# Patient Record
Sex: FEMALE | Race: WHITE | NOT HISPANIC OR LATINO | Employment: OTHER | URBAN - METROPOLITAN AREA
[De-identification: names, ages, dates, MRNs, and addresses within clinical notes are randomized per-mention and may not be internally consistent; named-entity substitution may affect disease eponyms.]

---

## 2017-05-25 RX ORDER — AZELASTINE 1 MG/ML
1 SPRAY, METERED NASAL
COMMUNITY
End: 2019-10-23

## 2017-06-01 ENCOUNTER — DOCUMENTATION ONLY (OUTPATIENT)
Dept: HEMATOLOGY/ONCOLOGY | Facility: CLINIC | Age: 77
End: 2017-06-01

## 2018-03-15 ENCOUNTER — OFFICE VISIT (OUTPATIENT)
Dept: HEMATOLOGY/ONCOLOGY | Facility: CLINIC | Age: 78
End: 2018-03-15
Payer: MEDICARE

## 2018-03-15 VITALS
TEMPERATURE: 97 F | HEART RATE: 66 BPM | DIASTOLIC BLOOD PRESSURE: 52 MMHG | WEIGHT: 177 LBS | BODY MASS INDEX: 29.01 KG/M2 | RESPIRATION RATE: 18 BRPM | SYSTOLIC BLOOD PRESSURE: 104 MMHG

## 2018-03-15 DIAGNOSIS — Z85.3 PERSONAL HISTORY OF MALIGNANT NEOPLASM OF BREAST: Primary | ICD-10-CM

## 2018-03-15 PROCEDURE — 99214 OFFICE O/P EST MOD 30 MIN: CPT | Mod: ,,, | Performed by: INTERNAL MEDICINE

## 2018-03-15 RX ORDER — ASPIRIN 81 MG/1
81 TABLET ORAL
COMMUNITY
End: 2019-10-23

## 2018-03-15 RX ORDER — FUROSEMIDE 20 MG/1
20 TABLET ORAL 2 TIMES DAILY
COMMUNITY

## 2018-03-15 RX ORDER — HYDROCODONE BITARTRATE AND ACETAMINOPHEN 7.5; 325 MG/1; MG/1
1 TABLET ORAL
COMMUNITY
Start: 2016-03-28 | End: 2019-10-23

## 2018-03-15 RX ORDER — CLEMASTINE FUMARATE 2.68 MG/1
2.68 TABLET ORAL
COMMUNITY
End: 2019-10-23

## 2018-03-15 RX ORDER — INSULIN GLARGINE 100 [IU]/ML
55 INJECTION, SOLUTION SUBCUTANEOUS
COMMUNITY
Start: 2015-11-09 | End: 2019-10-23 | Stop reason: SDUPTHER

## 2018-03-15 RX ORDER — CLONIDINE HYDROCHLORIDE 0.1 MG/1
0.1 TABLET, EXTENDED RELEASE ORAL
COMMUNITY
End: 2019-10-23

## 2018-03-15 RX ORDER — ENALAPRIL MALEATE 10 MG/1
10 TABLET ORAL
COMMUNITY
End: 2019-10-23

## 2018-03-15 RX ORDER — METOPROLOL SUCCINATE 200 MG/1
100 TABLET, EXTENDED RELEASE ORAL
COMMUNITY
End: 2019-10-23 | Stop reason: ALTCHOICE

## 2018-03-15 RX ORDER — GABAPENTIN 300 MG/1
300 CAPSULE ORAL
COMMUNITY
End: 2019-10-23 | Stop reason: SDUPTHER

## 2018-03-15 RX ORDER — SEVELAMER CARBONATE 800 MG/1
1600 TABLET, FILM COATED ORAL
COMMUNITY

## 2018-03-15 RX ORDER — TAMOXIFEN CITRATE 20 MG/1
20 TABLET ORAL
COMMUNITY
End: 2019-10-23

## 2018-03-15 RX ORDER — METOPROLOL SUCCINATE 100 MG/1
100 TABLET, EXTENDED RELEASE ORAL
COMMUNITY
End: 2019-10-23 | Stop reason: ALTCHOICE

## 2018-03-15 RX ORDER — ONDANSETRON 4 MG/1
4 TABLET, FILM COATED ORAL
COMMUNITY
Start: 2017-11-27 | End: 2018-11-27

## 2018-03-15 RX ORDER — AZELASTINE 1 MG/ML
1 SPRAY, METERED NASAL
COMMUNITY
End: 2019-10-23

## 2018-03-15 RX ORDER — PRAVASTATIN SODIUM 40 MG/1
40 TABLET ORAL NIGHTLY
COMMUNITY

## 2018-03-15 RX ORDER — HYDROXYZINE HYDROCHLORIDE 25 MG/1
25 TABLET, FILM COATED ORAL
COMMUNITY
End: 2019-10-23

## 2018-03-15 RX ORDER — METOLAZONE 2.5 MG/1
2.5 TABLET ORAL
COMMUNITY
End: 2019-10-23

## 2018-03-15 RX ORDER — LOSARTAN POTASSIUM 25 MG/1
25 TABLET ORAL DAILY
Status: ON HOLD | COMMUNITY
End: 2019-10-29 | Stop reason: HOSPADM

## 2018-03-18 PROBLEM — Z85.3 PERSONAL HISTORY OF MALIGNANT NEOPLASM OF BREAST: Status: ACTIVE | Noted: 2018-03-18

## 2018-03-19 NOTE — PROGRESS NOTES
[facility] History & Physical    Subjective:      Patient ID:   Brandi Camacho  78 y.o. female  1940  Huey Skinner      Chief Complaint:   Breast cancer    HPI:  78 y.o. female with diagnosis of  L breast cancer, 2011.  Stage 1 dx.  Adjuvant Tamoxifen x's 5 years.  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  MWF with procrit.    Smoke no, ETOH no.    DM, CRF, GERD, UTI, arthritis    Shunt placement L arm.  Hysterectomy, , R & L knee surgery.  MRM at L breast.    ROS:   GEN: normal without any fever, night sweats or weight loss  HEENT: normal with no HA's, sore throat, stiff neck, changes in vision  CV: normal with no CP, SOB, PND, ALVARES or orthopnea  PULM: normal with no SOB, cough, hemoptysis, sputum or pleuritic pain  GI: normal with no abdominal pain, nausea, vomiting, constipation, diarrhea, melanotic stools, BRBPR, or hematemesis  : See HPI  BREAST: See HPI  SKIN: normal with no rash, erythema, bruising, or swelling     Past Medical History:   Diagnosis Date    Anemia     sees Dr. Loyola and Dr. Turner    Anticoagulant long-term use     aspirin    Arthritis     Cancer     left breast ca    Diabetes mellitus     Encounter for blood transfusion     GERD (gastroesophageal reflux disease)     Hypertension     sees Dr. Farmer    Urinary tract infection      Past Surgical History:   Procedure Laterality Date    BREAST SURGERY  2011    left mastectomy [facility] dr begum     SECTION      x2    endoscopic precancerous adenomatous      [facility], ampulla of vater    HYSTERECTOMY      EDI    left knee patella FRACTURE      MODIFIED RADICAL MASTECTOMY W/ AXILLARY LYMPH NODE DISSECTION      right knee orthoscope         Review of patient's allergies indicates:   Allergen Reactions    Metformin Rash and Nausea And Vomiting     Other reaction(s): Unknown    Bactrim [sulfamethoxazole-trimethoprim] Rash    Sulfamethoxazole Rash     Other reaction(s): Rash  Other  reaction(s): Rash    Trimethoprim Rash     Other reaction(s): Rash  Other reaction(s): Rash     Social History     Social History    Marital status:      Spouse name: N/A    Number of children: N/A    Years of education: N/A     Occupational History    Not on file.     Social History Main Topics    Smoking status: Never Smoker    Smokeless tobacco: Never Used    Alcohol use No    Drug use: No    Sexual activity: Not on file     Other Topics Concern    Not on file     Social History Narrative    No narrative on file         Current Outpatient Prescriptions:     hydrocodone-acetaminophen 7.5-325mg (NORCO) 7.5-325 mg per tablet, Take 1 tablet by mouth., Disp: , Rfl:     insulin glargine (LANTUS) 100 unit/mL injection, Inject 55 Units into the skin., Disp: , Rfl:     ondansetron (ZOFRAN) 4 MG tablet, Take 4 mg by mouth., Disp: , Rfl:     ALOE VERA ORAL, No Sig Provided, Disp: , Rfl:     amlodipine (NORVASC) 5 MG tablet, Take 1 tablet (5 mg total) by mouth 2 (two) times daily., Disp: 60 tablet, Rfl: 0    aspirin (ECOTRIN) 81 MG EC tablet, Take 81 mg by mouth once daily., Disp: , Rfl:     aspirin (ECOTRIN) 81 MG EC tablet, Take 81 mg by mouth., Disp: , Rfl:     azelastine (ASTELIN) 137 mcg (0.1 %) nasal spray, 1 spray by Nasal route., Disp: , Rfl:     azelastine (ASTELIN) 137 mcg (0.1 %) nasal spray, 1 spray by Nasal route., Disp: , Rfl:     benzonatate (TESSALON) 100 MG capsule, Take 100 mg by mouth 3 (three) times daily as needed for Cough., Disp: , Rfl:     clemastine (TAVIST) 2.68 mg Tab, Take 2.68 mg by mouth 2 (two) times daily as needed., Disp: , Rfl:     clemastine (TAVIST) 2.68 mg Tab, Take 2.68 mg by mouth., Disp: , Rfl:     cloNIDine HCl 0.1 mg Tb12, Take 0.1 mg by mouth., Disp: , Rfl:     docusate sodium (COLACE) 50 MG capsule, Take by mouth daily as needed for Constipation., Disp: , Rfl:     docusate sodium (COLACE) 50 MG capsule, Take by mouth., Disp: , Rfl:     enalapril  (VASOTEC) 10 MG tablet, Take 10 mg by mouth 2 (two) times daily. , Disp: , Rfl:     enalapril (VASOTEC) 10 MG tablet, Take 10 mg by mouth., Disp: , Rfl:     furosemide (LASIX) 40 MG tablet, Take 40 mg by mouth., Disp: , Rfl:     gabapentin (NEURONTIN) 300 MG capsule, Take 300 mg by mouth 4 (four) times daily. , Disp: , Rfl:     gabapentin (NEURONTIN) 300 MG capsule, Take 300 mg by mouth., Disp: , Rfl:     hydrALAZINE (APRESOLINE) 50 MG tablet, Take 1 tablet (50 mg total) by mouth every 8 (eight) hours., Disp: 90 tablet, Rfl: 0    hydrOXYzine HCl (ATARAX) 25 MG tablet, Take 25 mg by mouth., Disp: , Rfl:     hydrOXYzine pamoate (VISTARIL) 25 MG Cap, Take 25 mg by mouth 2 (two) times daily as needed., Disp: , Rfl:     insulin glargine (LANTUS) 100 unit/mL injection, Inject 55 Units into the skin every evening. No Sig Provided, Disp: , Rfl:     insulin NPH-insulin regular (NOVOLIN 70/30) 100 unit/mL (70-30) injection, Inject 65 Units into the skin once daily. Every morning, Disp: , Rfl:     losartan (COZAAR) 100 MG tablet, Take 100 mg by mouth once daily., Disp: , Rfl:     losartan (COZAAR) 100 MG tablet, Take 100 mg by mouth., Disp: , Rfl:     metolazone (ZAROXOLYN) 2.5 MG tablet, Take 2.5 mg by mouth 2 (two) times daily. , Disp: , Rfl:     metOLazone (ZAROXOLYN) 2.5 MG tablet, Take 2.5 mg by mouth., Disp: , Rfl:     metoprolol succinate (TOPROL-XL) 100 MG 24 hr tablet, Take 100 mg by mouth 2 (two) times daily., Disp: , Rfl:     metoprolol succinate (TOPROL-XL) 100 MG 24 hr tablet, Take 100 mg by mouth., Disp: , Rfl:     metoprolol succinate (TOPROL-XL) 200 MG 24 hr tablet, Take 100 mg by mouth., Disp: , Rfl:     omega-3 fatty acids-fish oil 340-1,000 mg Cap, Take 1 capsule by mouth once daily., Disp: 30 capsule, Rfl: 0    oxybutynin (DITROPAN-XL) 5 MG TR24, Take 10 mg by mouth once daily., Disp: , Rfl:     polyethylene glycol (COLYTE) 240-22.72-6.72 gram solution, Use 17 g in 8 ounces of water  orally daily, Disp: 4000 mL, Rfl: 0    pravastatin (PRAVACHOL) 40 MG tablet, Take 40 mg by mouth every evening., Disp: , Rfl:     pravastatin (PRAVACHOL) 40 MG tablet, Take 40 mg by mouth., Disp: , Rfl:     ranitidine (ZANTAC) 150 MG tablet, Take 150 mg by mouth 2 (two) times daily., Disp: , Rfl:     ranitidine (ZANTAC) 150 MG tablet, Take 150 mg by mouth., Disp: , Rfl:     sevelamer carbonate (RENVELA) 800 mg Tab, Take 800 mg by mouth., Disp: , Rfl:     simethicone 40 mg Chew, Take 40 mg by mouth every meal as needed., Disp: 100 tablet, Rfl: 0    tamoxifen (NOLVADEX) 20 MG Tab, Take 20 mg by mouth once daily., Disp: , Rfl:     tamoxifen (NOLVADEX) 20 MG Tab, Take 20 mg by mouth., Disp: , Rfl:           Objective:   Vitals:  Blood pressure (!) 104/52, pulse 66, temperature 97 °F (36.1 °C), resp. rate 18, weight 80.3 kg (177 lb).    Physical Examination:   GEN: no apparent distress, comfortable  HEAD: atraumatic and normocephalic  EYES: no pallor, no icterus  ENT:  no pharyngeal erythema, external ears WNL; no nasal discharge  NECK: no masses, thyroid normal, trachea midline, no LAD/LN's, supple  CV: RRR with no murmur; normal pulse; normal S1 and S2; no pedal edema  CHEST: Normal respiratory effort; CTAB; normal breath sounds; no wheeze or crackles  ABDOM: nontender and nondistended; soft; normal bowel sounds; no rebound/guarding  MUSC/Skeletal: ROM normal; no crepitus; joints normal; no deformities   EXTREM: shunt site L arm with thrill  SKIN: no rashes, lesions, ulcers, petechiae   : no mcgovern  NEURO: grossly intact; motor/sensory WNL; no tremors  PSYCH: normal mood, affect and behavior  LYMPH: normal cervical, supraclavicular, axillary and groin LN's  L chest wall with post op changes, no palpable mass.  R breast NT, with out palpable mass.    Labs:   Lab Results   Component Value Date    WBC 12.40 04/26/2016    HGB 8.0 (L) 04/26/2016    HCT 24.3 (L) 04/26/2016    MCV 95 04/26/2016     04/26/2016     CMP  Sodium   Date Value Ref Range Status   04/26/2016 135 (L) 136 - 145 mmol/L Final     Potassium   Date Value Ref Range Status   04/26/2016 3.9 3.5 - 5.1 mmol/L Final     Chloride   Date Value Ref Range Status   04/26/2016 103 95 - 110 mmol/L Final     CO2   Date Value Ref Range Status   04/26/2016 20 (L) 23 - 29 mmol/L Final     Glucose   Date Value Ref Range Status   04/26/2016 122 (H) 70 - 110 mg/dL Final     BUN, Bld   Date Value Ref Range Status   04/26/2016 31 (H) 8 - 23 mg/dL Final     Creatinine   Date Value Ref Range Status   04/26/2016 3.7 (H) 0.5 - 1.4 mg/dL Final     Calcium   Date Value Ref Range Status   04/26/2016 8.2 (L) 8.7 - 10.5 mg/dL Final     Total Protein   Date Value Ref Range Status   11/10/2015 6.6 6.0 - 8.4 g/dL Final     Albumin   Date Value Ref Range Status   11/10/2015 2.3 (L) 3.5 - 5.2 g/dL Final     Total Bilirubin   Date Value Ref Range Status   11/10/2015 0.2 0.1 - 1.0 mg/dL Final     Comment:     For infants and newborns, interpretation of results should be based  on gestational age, weight and in agreement with clinical  observations.  Premature Infant recommended reference ranges:  Up to 24 hours.............<8.0 mg/dL  Up to 48 hours............<12.0 mg/dL  3-5 days..................<15.0 mg/dL  6-29 days.................<15.0 mg/dL       Alkaline Phosphatase   Date Value Ref Range Status   11/10/2015 75 55 - 135 U/L Final     AST   Date Value Ref Range Status   11/10/2015 14 10 - 40 U/L Final     ALT   Date Value Ref Range Status   11/10/2015 22 10 - 44 U/L Final     Anion Gap   Date Value Ref Range Status   04/26/2016 12 8 - 16 mmol/L Final     eGFR if    Date Value Ref Range Status   04/26/2016 13 (A) >60 mL/min/1.73 m^2 Final     eGFR if non    Date Value Ref Range Status   04/26/2016 11 (A) >60 mL/min/1.73 m^2 Final     Comment:     Calculation used to obtain the estimated glomerular filtration  rate (eGFR) is the CKD-EPI equation. Since  race is unknown   in our information system, the eGFR values for   -American and Non--American patients are given   for each creatinine result.       Radiology/Diagnostic Studies:    U/S resolving hematoma at R breast    Assessment:   (1) 78 y.o. female with diagnosis of  L breast cancer, 8/2011.       Stage 1 Dx, adjuvant Tamoxifen daily.    (2)Anemia 2nd CRF, on dialysis, on procrit.    RTC 6 months.

## 2018-09-13 ENCOUNTER — TELEPHONE (OUTPATIENT)
Dept: HEMATOLOGY/ONCOLOGY | Facility: CLINIC | Age: 78
End: 2018-09-13

## 2018-09-13 ENCOUNTER — OFFICE VISIT (OUTPATIENT)
Dept: HEMATOLOGY/ONCOLOGY | Facility: CLINIC | Age: 78
End: 2018-09-13
Payer: MEDICARE

## 2018-09-13 VITALS
SYSTOLIC BLOOD PRESSURE: 106 MMHG | DIASTOLIC BLOOD PRESSURE: 58 MMHG | TEMPERATURE: 97 F | HEIGHT: 65 IN | BODY MASS INDEX: 29.32 KG/M2 | HEART RATE: 54 BPM | OXYGEN SATURATION: 96 % | WEIGHT: 176 LBS

## 2018-09-13 DIAGNOSIS — N18.4 CKD (CHRONIC KIDNEY DISEASE) STAGE 4, GFR 15-29 ML/MIN: ICD-10-CM

## 2018-09-13 DIAGNOSIS — Z85.3 PERSONAL HISTORY OF MALIGNANT NEOPLASM OF BREAST: Primary | ICD-10-CM

## 2018-09-13 DIAGNOSIS — K21.9 GASTROESOPHAGEAL REFLUX DISEASE, ESOPHAGITIS PRESENCE NOT SPECIFIED: ICD-10-CM

## 2018-09-13 PROCEDURE — 99214 OFFICE O/P EST MOD 30 MIN: CPT | Mod: ,,, | Performed by: INTERNAL MEDICINE

## 2018-09-13 NOTE — LETTER
September 16, 2018      No Recipients           [facility]  [address]  [place] [address]  Phone: [phone]  Fax: [phone]          Patient: Brandi Camacho   MR Number: [id number]   YOB: 1940   Date of Visit: 9/13/2018       Dear Dr. Greg Skinner IV:    Thank you for referring Brandi Camacho to me for evaluation. Attached you will find relevant portions of my assessment and plan of care.    If you have questions, please do not hesitate to call me. I look forward to following Brandi Camacho along with you.    Sincerely,    KATHY Tucker MD    Enclosure  CC:  No Recipients    If you would like to receive this communication electronically, please contact [email or web address] or [phone] to request more information on [employer] Link access.    For providers and/or their staff who would like to refer a patient to [facility], please contact us through our one-stop-shop provider referral line, Sagar Candelario, at [phone].    If you feel you have received this communication in error or would no longer like to receive these types of communications, please e-mail [email or web address]

## 2018-09-16 ENCOUNTER — TELEPHONE (OUTPATIENT)
Dept: HEMATOLOGY/ONCOLOGY | Facility: CLINIC | Age: 78
End: 2018-09-16

## 2018-09-17 NOTE — TELEPHONE ENCOUNTER
Call Mine JORGE  Where can pt get fitted for breast prosthesis locally?    Call pharmacist at [facility], can kytril 1 mg daily for nausea be used in pt with dialysis dependant renal failure?

## 2018-09-17 NOTE — PROGRESS NOTES
[facility] History & Physical    Subjective:      Patient ID:   Brandi Camacho  78 y.o. female  1940  Vanessa Banks      Chief Complaint:   Breast cancer    HPI:  78 y.o. female with diagnosis of  L breast cancer, 2011.  Stage 1 dx.  Adjuvant Tamoxifen x's 5 years.  Now 7 years since Dx.    Anemia 2nd CRF, on dialysis x's 1 -2  year.  MWF with procrit.    Smoke no, ETOH no.    DM, CRF, GERD, UTI, arthritis    Shunt placement L arm.  Hysterectomy, , R & L knee surgery.  MRM at  breast.    ROS:   GEN: normal without any fever, night sweats or weight loss  HEENT: normal with no HA's, sore throat, stiff neck, changes in vision  CV: normal with no CP, SOB, PND, ALVARES or orthopnea  PULM: normal with no SOB, cough, hemoptysis, sputum or pleuritic pain  GI: normal with no abdominal pain, nausea, vomiting, constipation, diarrhea, melanotic stools, BRBPR, or hematemesis  : See HPI  BREAST: See HPI  SKIN: normal with no rash, erythema, bruising, or swelling     Past Medical History:   Diagnosis Date    Anemia     sees Dr. Loyola and Dr. Turner    Anticoagulant long-term use     aspirin    Arthritis     Cancer     left breast ca    Diabetes mellitus     Encounter for blood transfusion     GERD (gastroesophageal reflux disease)     Hypertension     sees Dr. Farmer    Urinary tract infection      Past Surgical History:   Procedure Laterality Date    BREAST SURGERY  2011    left mastectomy [facility] dr begum     SECTION      x2    EGD (ESOPHAGOGASTRODUODENOSCOPY) N/A 2014    Performed by [facility]angelo [facility]    endoscopic precancerous adenomatous      main [place] dr colvin, ampulla of vater    ESOPHAGOGASTRODUODENOSCOPY (EGD) N/A 2015    [facility] by [facility]ennise [facility]ona [facility] at [facility]    HEMOSPLIT INSERTION N/A 2016    [facility] by Spencer [facility] at [facility] OR    HYSTERECTOMY      EDI    left knee patella FRACTURE      MODIFIED RADICAL  MASTECTOMY W/ AXILLARY LYMPH NODE DISSECTION      right knee orthoscope         Review of patient's allergies indicates:   Allergen Reactions    Metformin Rash and Nausea And Vomiting     Other reaction(s): Unknown    Bactrim [sulfamethoxazole-trimethoprim] Rash    Sulfamethoxazole Rash     Other reaction(s): Rash  Other reaction(s): Rash    Trimethoprim Rash     Other reaction(s): Rash  Other reaction(s): Rash     Social History     Socioeconomic History    Marital status:      Spouse name: Not on file    Number of children: Not on file    Years of education: Not on file    Highest education level: Not on file   Social Needs    Financial resource strain: Not on file    Food insecurity - worry: Not on file    Food insecurity - inability: Not on file    Transportation needs - medical: Not on file    Transportation needs - non-medical: Not on file   Occupational History    Not on file   Tobacco Use    Smoking status: Never Smoker    Smokeless tobacco: Never Used   Substance and Sexual Activity    Alcohol use: No    Drug use: No    Sexual activity: Not on file   Other Topics Concern    Not on file   Social History Narrative    Not on file         Current Outpatient Medications:     ALOE VERA ORAL, No Sig Provided, Disp: , Rfl:     aspirin (ECOTRIN) 81 MG EC tablet, Take 81 mg by mouth once daily., Disp: , Rfl:     aspirin (ECOTRIN) 81 MG EC tablet, Take 81 mg by mouth., Disp: , Rfl:     azelastine (ASTELIN) 137 mcg (0.1 %) nasal spray, 1 spray by Nasal route., Disp: , Rfl:     azelastine (ASTELIN) 137 mcg (0.1 %) nasal spray, 1 spray by Nasal route., Disp: , Rfl:     benzonatate (TESSALON) 100 MG capsule, Take 100 mg by mouth 3 (three) times daily as needed for Cough., Disp: , Rfl:     clemastine (TAVIST) 2.68 mg Tab, Take 2.68 mg by mouth 2 (two) times daily as needed., Disp: , Rfl:     clemastine (TAVIST) 2.68 mg Tab, Take 2.68 mg by mouth., Disp: , Rfl:     cloNIDine HCl 0.1 mg  Tb12, Take 0.1 mg by mouth., Disp: , Rfl:     docusate sodium (COLACE) 50 MG capsule, Take by mouth daily as needed for Constipation., Disp: , Rfl:     docusate sodium (COLACE) 50 MG capsule, Take by mouth., Disp: , Rfl:     enalapril (VASOTEC) 10 MG tablet, Take 10 mg by mouth 2 (two) times daily. , Disp: , Rfl:     enalapril (VASOTEC) 10 MG tablet, Take 10 mg by mouth., Disp: , Rfl:     furosemide (LASIX) 40 MG tablet, Take 40 mg by mouth., Disp: , Rfl:     gabapentin (NEURONTIN) 300 MG capsule, Take 300 mg by mouth 4 (four) times daily. , Disp: , Rfl:     gabapentin (NEURONTIN) 300 MG capsule, Take 300 mg by mouth., Disp: , Rfl:     hydrocodone-acetaminophen 7.5-325mg (NORCO) 7.5-325 mg per tablet, Take 1 tablet by mouth., Disp: , Rfl:     hydrOXYzine HCl (ATARAX) 25 MG tablet, Take 25 mg by mouth., Disp: , Rfl:     hydrOXYzine pamoate (VISTARIL) 25 MG Cap, Take 25 mg by mouth 2 (two) times daily as needed., Disp: , Rfl:     insulin glargine (LANTUS) 100 unit/mL injection, Inject 55 Units into the skin every evening. No Sig Provided, Disp: , Rfl:     insulin glargine (LANTUS) 100 unit/mL injection, Inject 55 Units into the skin., Disp: , Rfl:     insulin NPH-insulin regular (NOVOLIN 70/30) 100 unit/mL (70-30) injection, Inject 65 Units into the skin once daily. Every morning, Disp: , Rfl:     losartan (COZAAR) 100 MG tablet, Take 100 mg by mouth once daily., Disp: , Rfl:     losartan (COZAAR) 100 MG tablet, Take 100 mg by mouth., Disp: , Rfl:     metolazone (ZAROXOLYN) 2.5 MG tablet, Take 2.5 mg by mouth 2 (two) times daily. , Disp: , Rfl:     metOLazone (ZAROXOLYN) 2.5 MG tablet, Take 2.5 mg by mouth., Disp: , Rfl:     metoprolol succinate (TOPROL-XL) 100 MG 24 hr tablet, Take 100 mg by mouth 2 (two) times daily., Disp: , Rfl:     metoprolol succinate (TOPROL-XL) 100 MG 24 hr tablet, Take 100 mg by mouth., Disp: , Rfl:     metoprolol succinate (TOPROL-XL) 200 MG 24 hr tablet, Take 100 mg by  "mouth., Disp: , Rfl:     ondansetron (ZOFRAN) 4 MG tablet, Take 4 mg by mouth., Disp: , Rfl:     oxybutynin (DITROPAN-XL) 5 MG TR24, Take 10 mg by mouth once daily., Disp: , Rfl:     polyethylene glycol (COLYTE) 240-22.72-6.72 gram solution, Use 17 g in 8 ounces of water orally daily, Disp: 4000 mL, Rfl: 0    pravastatin (PRAVACHOL) 40 MG tablet, Take 40 mg by mouth every evening., Disp: , Rfl:     pravastatin (PRAVACHOL) 40 MG tablet, Take 40 mg by mouth., Disp: , Rfl:     ranitidine (ZANTAC) 150 MG tablet, Take 150 mg by mouth 2 (two) times daily., Disp: , Rfl:     ranitidine (ZANTAC) 150 MG tablet, Take 150 mg by mouth., Disp: , Rfl:     sevelamer carbonate (RENVELA) 800 mg Tab, Take 800 mg by mouth., Disp: , Rfl:     simethicone 40 mg Chew, Take 40 mg by mouth every meal as needed., Disp: 100 tablet, Rfl: 0    tamoxifen (NOLVADEX) 20 MG Tab, Take 20 mg by mouth once daily., Disp: , Rfl:     tamoxifen (NOLVADEX) 20 MG Tab, Take 20 mg by mouth., Disp: , Rfl:     amlodipine (NORVASC) 5 MG tablet, Take 1 tablet (5 mg total) by mouth 2 (two) times daily., Disp: 60 tablet, Rfl: 0    hydrALAZINE (APRESOLINE) 50 MG tablet, Take 1 tablet (50 mg total) by mouth every 8 (eight) hours., Disp: 90 tablet, Rfl: 0    omega-3 fatty acids-fish oil 340-1,000 mg Cap, Take 1 capsule by mouth once daily., Disp: 30 capsule, Rfl: 0          Objective:   Vitals:  Blood pressure (!) 106/58, pulse (!) 54, temperature 97 °F (36.1 °C), height 5' 5" (1.651 m), weight 79.8 kg (176 lb), SpO2 96 %.    Physical Examination:   GEN: no apparent distress, comfortable  HEAD: atraumatic and normocephalic  EYES: no pallor, no icterus  ENT:  no pharyngeal erythema, external ears WNL; no nasal discharge  NECK: no masses, thyroid normal, trachea midline, no LAD/LN's, supple  CV: RRR with no murmur; normal pulse; normal S1 and S2; no pedal edema  CHEST: Normal respiratory effort; CTAB; normal breath sounds; no wheeze or crackles  ABDOM: " nontender and nondistended; soft; normal bowel sounds; no rebound/guarding  MUSC/Skeletal: ROM normal; no crepitus; joints normal; no deformities   EXTREM: shunt site L arm with thrill  SKIN: no rashes, lesions, ulcers, petechiae   : no mcgovern  NEURO: grossly intact; motor/sensory WNL; no tremors  PSYCH: normal mood, affect and behavior  LYMPH: normal cervical, supraclavicular, axillary and groin LN's  L chest wall with post op changes, no palpable mass.  R breast NT, with out palpable mass.    Labs:   Lab Results   Component Value Date    WBC 12.40 04/26/2016    HGB 8.0 (L) 04/26/2016    HCT 24.3 (L) 04/26/2016    MCV 95 04/26/2016     04/26/2016    CMP  Sodium   Date Value Ref Range Status   04/26/2016 135 (L) 136 - 145 mmol/L Final     Potassium   Date Value Ref Range Status   04/26/2016 3.9 3.5 - 5.1 mmol/L Final     Chloride   Date Value Ref Range Status   04/26/2016 103 95 - 110 mmol/L Final     CO2   Date Value Ref Range Status   04/26/2016 20 (L) 23 - 29 mmol/L Final     Glucose   Date Value Ref Range Status   04/26/2016 122 (H) 70 - 110 mg/dL Final     BUN, Bld   Date Value Ref Range Status   04/26/2016 31 (H) 8 - 23 mg/dL Final     Creatinine   Date Value Ref Range Status   04/26/2016 3.7 (H) 0.5 - 1.4 mg/dL Final     Calcium   Date Value Ref Range Status   04/26/2016 8.2 (L) 8.7 - 10.5 mg/dL Final     Total Protein   Date Value Ref Range Status   11/10/2015 6.6 6.0 - 8.4 g/dL Final     Albumin   Date Value Ref Range Status   11/10/2015 2.3 (L) 3.5 - 5.2 g/dL Final     Total Bilirubin   Date Value Ref Range Status   11/10/2015 0.2 0.1 - 1.0 mg/dL Final     Comment:     For infants and newborns, interpretation of results should be based  on gestational age, weight and in agreement with clinical  observations.  Premature Infant recommended reference ranges:  Up to 24 hours.............<8.0 mg/dL  Up to 48 hours............<12.0 mg/dL  3-5 days..................<15.0 mg/dL  6-29  days.................<15.0 mg/dL       Alkaline Phosphatase   Date Value Ref Range Status   11/10/2015 75 55 - 135 U/L Final     AST   Date Value Ref Range Status   11/10/2015 14 10 - 40 U/L Final     ALT   Date Value Ref Range Status   11/10/2015 22 10 - 44 U/L Final     Anion Gap   Date Value Ref Range Status   04/26/2016 12 8 - 16 mmol/L Final     eGFR if    Date Value Ref Range Status   04/26/2016 13 (A) >60 mL/min/1.73 m^2 Final     eGFR if non    Date Value Ref Range Status   04/26/2016 11 (A) >60 mL/min/1.73 m^2 Final     Comment:     Calculation used to obtain the estimated glomerular filtration  rate (eGFR) is the CKD-EPI equation. Since race is unknown   in our information system, the eGFR values for   -American and Non--American patients are given   for each creatinine result.       Radiology/Diagnostic Studies:    U/S resolving hematoma at R breast    Assessment:   (1) 78 y.o. female with diagnosis of  L breast cancer, 8/2011.       Stage 1 Dx, adjuvant Tamoxifen daily.    (2)Anemia 2nd CRF, on dialysis, on procrit.    RTC 9 months.    Check on availability of breast prosthesis.  Also use of kytril in dialysis dependant CRF.

## 2019-10-23 ENCOUNTER — HOSPITAL ENCOUNTER (OUTPATIENT)
Facility: HOSPITAL | Age: 79
Discharge: HOME HEALTH | End: 2019-10-29
Attending: EMERGENCY MEDICINE | Admitting: FAMILY MEDICINE
Payer: MEDICARE

## 2019-10-23 DIAGNOSIS — R52 INTRACTABLE PAIN: ICD-10-CM
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
## 2019-10-23 DIAGNOSIS — S32.401A CLOSED NONDISPLACED FRACTURE OF RIGHT ACETABULUM, UNSPECIFIED PORTION OF ACETABULUM, INITIAL ENCOUNTER: Primary | ICD-10-CM

## 2019-10-23 PROBLEM — S32.591A CLOSED FRACTURE OF RIGHT INFERIOR PUBIC RAMUS: Status: ACTIVE | Noted: 2019-10-23

## 2019-10-23 PROBLEM — N18.6 ESRD (END STAGE RENAL DISEASE): Status: ACTIVE | Noted: 2019-10-23

## 2019-10-23 LAB
ALBUMIN SERPL BCP-MCNC: 2.9 G/DL (ref 3.5–5.2)
ALP SERPL-CCNC: 122 U/L (ref 55–135)
ALT SERPL W/O P-5'-P-CCNC: 18 U/L (ref 10–44)
ANION GAP SERPL CALC-SCNC: 13 MMOL/L (ref 8–16)
AST SERPL-CCNC: 23 U/L (ref 10–40)
BASOPHILS # BLD AUTO: 0.04 K/UL (ref 0–0.2)
BASOPHILS NFR BLD: 0.8 % (ref 0–1.9)
BILIRUB SERPL-MCNC: 1.2 MG/DL (ref 0.1–1)
BUN SERPL-MCNC: 46 MG/DL (ref 8–23)
CALCIUM SERPL-MCNC: 7.4 MG/DL (ref 8.7–10.5)
CHLORIDE SERPL-SCNC: 100 MMOL/L (ref 95–110)
CO2 SERPL-SCNC: 23 MMOL/L (ref 23–29)
CREAT SERPL-MCNC: 6.2 MG/DL (ref 0.5–1.4)
DIFFERENTIAL METHOD: ABNORMAL
EOSINOPHIL # BLD AUTO: 0.2 K/UL (ref 0–0.5)
EOSINOPHIL NFR BLD: 4.2 % (ref 0–8)
ERYTHROCYTE [DISTWIDTH] IN BLOOD BY AUTOMATED COUNT: 17 % (ref 11.5–14.5)
EST. GFR  (AFRICAN AMERICAN): 6.7 ML/MIN/1.73 M^2
EST. GFR  (NON AFRICAN AMERICAN): 5.8 ML/MIN/1.73 M^2
GLUCOSE SERPL-MCNC: 210 MG/DL (ref 70–110)
HCT VFR BLD AUTO: 35.5 % (ref 37–48.5)
HGB BLD-MCNC: 11.3 G/DL (ref 12–16)
IMM GRANULOCYTES # BLD AUTO: 0.02 K/UL (ref 0–0.04)
IMM GRANULOCYTES NFR BLD AUTO: 0.4 % (ref 0–0.5)
LYMPHOCYTES # BLD AUTO: 1 K/UL (ref 1–4.8)
LYMPHOCYTES NFR BLD: 19.6 % (ref 18–48)
MCH RBC QN AUTO: 33.5 PG (ref 27–31)
MCHC RBC AUTO-ENTMCNC: 31.8 G/DL (ref 32–36)
MCV RBC AUTO: 105 FL (ref 82–98)
MONOCYTES # BLD AUTO: 0.5 K/UL (ref 0.3–1)
MONOCYTES NFR BLD: 9.1 % (ref 4–15)
NEUTROPHILS # BLD AUTO: 3.3 K/UL (ref 1.8–7.7)
NEUTROPHILS NFR BLD: 65.9 % (ref 38–73)
NRBC BLD-RTO: 0 /100 WBC
PLATELET # BLD AUTO: 84 K/UL (ref 150–350)
PMV BLD AUTO: 10.5 FL (ref 9.2–12.9)
POTASSIUM SERPL-SCNC: 4.7 MMOL/L (ref 3.5–5.1)
PROT SERPL-MCNC: 6.2 G/DL (ref 6–8.4)
RBC # BLD AUTO: 3.37 M/UL (ref 4–5.4)
SODIUM SERPL-SCNC: 136 MMOL/L (ref 136–145)
WBC # BLD AUTO: 4.95 K/UL (ref 3.9–12.7)

## 2019-10-23 PROCEDURE — 96372 THER/PROPH/DIAG INJ SC/IM: CPT | Mod: 59

## 2019-10-23 PROCEDURE — 85025 COMPLETE CBC W/AUTO DIFF WBC: CPT

## 2019-10-23 PROCEDURE — 96375 TX/PRO/DX INJ NEW DRUG ADDON: CPT

## 2019-10-23 PROCEDURE — 25000003 PHARM REV CODE 250: Performed by: NURSE PRACTITIONER

## 2019-10-23 PROCEDURE — 80053 COMPREHEN METABOLIC PANEL: CPT

## 2019-10-23 PROCEDURE — 99285 EMERGENCY DEPT VISIT HI MDM: CPT | Mod: 25

## 2019-10-23 PROCEDURE — 63600175 PHARM REV CODE 636 W HCPCS: Performed by: EMERGENCY MEDICINE

## 2019-10-23 PROCEDURE — 96376 TX/PRO/DX INJ SAME DRUG ADON: CPT

## 2019-10-23 PROCEDURE — G0378 HOSPITAL OBSERVATION PER HR: HCPCS

## 2019-10-23 PROCEDURE — 96374 THER/PROPH/DIAG INJ IV PUSH: CPT

## 2019-10-23 PROCEDURE — 63600175 PHARM REV CODE 636 W HCPCS: Performed by: NURSE PRACTITIONER

## 2019-10-23 RX ORDER — ONDANSETRON 2 MG/ML
4 INJECTION INTRAMUSCULAR; INTRAVENOUS EVERY 8 HOURS PRN
Status: DISCONTINUED | OUTPATIENT
Start: 2019-10-23 | End: 2019-10-29 | Stop reason: HOSPADM

## 2019-10-23 RX ORDER — GLUCAGON 1 MG
1 KIT INJECTION
Status: DISCONTINUED | OUTPATIENT
Start: 2019-10-23 | End: 2019-10-29 | Stop reason: HOSPADM

## 2019-10-23 RX ORDER — GABAPENTIN 100 MG/1
100 CAPSULE ORAL 3 TIMES DAILY
Status: DISCONTINUED | OUTPATIENT
Start: 2019-10-23 | End: 2019-10-29 | Stop reason: HOSPADM

## 2019-10-23 RX ORDER — IBUPROFEN 200 MG
24 TABLET ORAL
Status: DISCONTINUED | OUTPATIENT
Start: 2019-10-23 | End: 2019-10-29 | Stop reason: HOSPADM

## 2019-10-23 RX ORDER — ONDANSETRON 4 MG/1
4 TABLET, ORALLY DISINTEGRATING ORAL EVERY 12 HOURS PRN
COMMUNITY

## 2019-10-23 RX ORDER — MORPHINE SULFATE 2 MG/ML
2 INJECTION, SOLUTION INTRAMUSCULAR; INTRAVENOUS
Status: COMPLETED | OUTPATIENT
Start: 2019-10-23 | End: 2019-10-23

## 2019-10-23 RX ORDER — INSULIN ASPART 100 [IU]/ML
30 INJECTION, SUSPENSION SUBCUTANEOUS DAILY
Status: DISCONTINUED | OUTPATIENT
Start: 2019-10-24 | End: 2019-10-24

## 2019-10-23 RX ORDER — SEVELAMER HYDROCHLORIDE 800 MG/1
1600 TABLET, FILM COATED ORAL 2 TIMES DAILY WITH MEALS
Status: DISCONTINUED | OUTPATIENT
Start: 2019-10-24 | End: 2019-10-29 | Stop reason: HOSPADM

## 2019-10-23 RX ORDER — FUROSEMIDE 20 MG/1
20 TABLET ORAL 2 TIMES DAILY
Status: DISCONTINUED | OUTPATIENT
Start: 2019-10-23 | End: 2019-10-29 | Stop reason: HOSPADM

## 2019-10-23 RX ORDER — FAMOTIDINE 20 MG/1
20 TABLET, FILM COATED ORAL DAILY
Status: DISCONTINUED | OUTPATIENT
Start: 2019-10-24 | End: 2019-10-29 | Stop reason: HOSPADM

## 2019-10-23 RX ORDER — LABETALOL 200 MG/1
200 TABLET, FILM COATED ORAL 2 TIMES DAILY
COMMUNITY

## 2019-10-23 RX ORDER — CETIRIZINE HYDROCHLORIDE 10 MG/1
10 TABLET ORAL DAILY
COMMUNITY

## 2019-10-23 RX ORDER — SEVELAMER HYDROCHLORIDE 800 MG/1
800 TABLET, FILM COATED ORAL
Status: DISCONTINUED | OUTPATIENT
Start: 2019-10-23 | End: 2019-10-29 | Stop reason: HOSPADM

## 2019-10-23 RX ORDER — LABETALOL 200 MG/1
200 TABLET, FILM COATED ORAL 2 TIMES DAILY
Status: DISCONTINUED | OUTPATIENT
Start: 2019-10-23 | End: 2019-10-29 | Stop reason: HOSPADM

## 2019-10-23 RX ORDER — CETIRIZINE HYDROCHLORIDE 10 MG/1
10 TABLET ORAL EVERY OTHER DAY
Status: DISCONTINUED | OUTPATIENT
Start: 2019-10-24 | End: 2019-10-29 | Stop reason: HOSPADM

## 2019-10-23 RX ORDER — OMEPRAZOLE 40 MG/1
40 CAPSULE, DELAYED RELEASE ORAL DAILY
COMMUNITY

## 2019-10-23 RX ORDER — INSULIN ASPART 100 [IU]/ML
10 INJECTION, SUSPENSION SUBCUTANEOUS NIGHTLY
Status: DISCONTINUED | OUTPATIENT
Start: 2019-10-23 | End: 2019-10-25

## 2019-10-23 RX ORDER — SEVELAMER CARBONATE 800 MG/1
800 TABLET, FILM COATED ORAL 2 TIMES DAILY WITH MEALS
COMMUNITY

## 2019-10-23 RX ORDER — PANTOPRAZOLE SODIUM 40 MG/1
40 TABLET, DELAYED RELEASE ORAL DAILY
Status: DISCONTINUED | OUTPATIENT
Start: 2019-10-24 | End: 2019-10-29 | Stop reason: HOSPADM

## 2019-10-23 RX ORDER — PROMETHAZINE HYDROCHLORIDE 12.5 MG/1
12.5 TABLET ORAL EVERY 8 HOURS PRN
COMMUNITY

## 2019-10-23 RX ORDER — AMOXICILLIN 250 MG
1 CAPSULE ORAL 2 TIMES DAILY
Status: DISCONTINUED | OUTPATIENT
Start: 2019-10-23 | End: 2019-10-29 | Stop reason: HOSPADM

## 2019-10-23 RX ORDER — ONDANSETRON 2 MG/ML
4 INJECTION INTRAMUSCULAR; INTRAVENOUS
Status: COMPLETED | OUTPATIENT
Start: 2019-10-23 | End: 2019-10-23

## 2019-10-23 RX ORDER — SODIUM CHLORIDE 0.9 % (FLUSH) 0.9 %
10 SYRINGE (ML) INJECTION
Status: DISCONTINUED | OUTPATIENT
Start: 2019-10-23 | End: 2019-10-29 | Stop reason: HOSPADM

## 2019-10-23 RX ORDER — MORPHINE SULFATE 2 MG/ML
2 INJECTION, SOLUTION INTRAMUSCULAR; INTRAVENOUS EVERY 4 HOURS PRN
Status: DISCONTINUED | OUTPATIENT
Start: 2019-10-23 | End: 2019-10-25

## 2019-10-23 RX ORDER — HYDROCODONE BITARTRATE AND ACETAMINOPHEN 5; 325 MG/1; MG/1
1 TABLET ORAL EVERY 6 HOURS PRN
Status: DISCONTINUED | OUTPATIENT
Start: 2019-10-23 | End: 2019-10-29 | Stop reason: HOSPADM

## 2019-10-23 RX ORDER — LOSARTAN POTASSIUM 25 MG/1
25 TABLET ORAL DAILY
Status: DISCONTINUED | OUTPATIENT
Start: 2019-10-24 | End: 2019-10-24

## 2019-10-23 RX ORDER — PRAVASTATIN SODIUM 40 MG/1
40 TABLET ORAL NIGHTLY
Status: DISCONTINUED | OUTPATIENT
Start: 2019-10-23 | End: 2019-10-29 | Stop reason: HOSPADM

## 2019-10-23 RX ORDER — IBUPROFEN 200 MG
16 TABLET ORAL
Status: DISCONTINUED | OUTPATIENT
Start: 2019-10-23 | End: 2019-10-29 | Stop reason: HOSPADM

## 2019-10-23 RX ORDER — INSULIN ASPART 100 [IU]/ML
0-5 INJECTION, SOLUTION INTRAVENOUS; SUBCUTANEOUS
Status: DISCONTINUED | OUTPATIENT
Start: 2019-10-23 | End: 2019-10-29 | Stop reason: HOSPADM

## 2019-10-23 RX ORDER — ACETAMINOPHEN 325 MG/1
650 TABLET ORAL EVERY 4 HOURS PRN
Status: DISCONTINUED | OUTPATIENT
Start: 2019-10-23 | End: 2019-10-29 | Stop reason: HOSPADM

## 2019-10-23 RX ADMIN — GABAPENTIN 100 MG: 100 CAPSULE ORAL at 09:10

## 2019-10-23 RX ADMIN — ONDANSETRON 4 MG: 2 INJECTION INTRAMUSCULAR; INTRAVENOUS at 12:10

## 2019-10-23 RX ADMIN — MORPHINE SULFATE 2 MG: 2 INJECTION, SOLUTION INTRAMUSCULAR; INTRAVENOUS at 03:10

## 2019-10-23 RX ADMIN — MORPHINE SULFATE 2 MG: 2 INJECTION, SOLUTION INTRAMUSCULAR; INTRAVENOUS at 02:10

## 2019-10-23 RX ADMIN — FUROSEMIDE 20 MG: 20 TABLET ORAL at 07:10

## 2019-10-23 RX ADMIN — PRAVASTATIN SODIUM 40 MG: 40 TABLET ORAL at 09:10

## 2019-10-23 RX ADMIN — HYDROCODONE BITARTRATE AND ACETAMINOPHEN 1 TABLET: 5; 325 TABLET ORAL at 09:10

## 2019-10-23 RX ADMIN — INSULIN ASPART 10 UNITS: 100 INJECTION, SUSPENSION SUBCUTANEOUS at 09:10

## 2019-10-23 RX ADMIN — SENNOSIDES AND DOCUSATE SODIUM 1 TABLET: 8.6; 5 TABLET ORAL at 09:10

## 2019-10-23 RX ADMIN — RENAGEL 800 MG: 800 TABLET ORAL at 07:10

## 2019-10-23 RX ADMIN — LABETALOL HYDROCHLORIDE 200 MG: 200 TABLET, FILM COATED ORAL at 09:10

## 2019-10-23 NOTE — SUBJECTIVE & OBJECTIVE
Past Medical History:   Diagnosis Date    Anemia     sees Dr. Loyola and Dr. Turner    Anticoagulant long-term use     aspirin    Arthritis     Cancer     left breast ca    Diabetes mellitus     Encounter for blood transfusion     GERD (gastroesophageal reflux disease)     Hypertension     sees Dr. Farmer    Renal disorder     [facility] on hemodialysis MWF    Urinary tract infection        Past Surgical History:   Procedure Laterality Date    AV FISTULA PLACEMENT Right     BREAST SURGERY  2011    left mastectomy [facility] dr begum     SECTION      x2    endoscopic precancerous adenomatous      [facility] dr colvin, ampulla of vater    HYSTERECTOMY      EDI    left knee patella FRACTURE      MODIFIED RADICAL MASTECTOMY W/ AXILLARY LYMPH NODE DISSECTION      right knee orthoscope         Review of patient's allergies indicates:   Allergen Reactions    Metformin Rash and Nausea And Vomiting     Other reaction(s): Unknown    Bactrim [sulfamethoxazole-trimethoprim] Rash    Sulfamethoxazole Rash     Other reaction(s): Rash  Other reaction(s): Rash    Trimethoprim Rash     Other reaction(s): Rash  Other reaction(s): Rash       No current facility-administered medications on file prior to encounter.      Current Outpatient Medications on File Prior to Encounter   Medication Sig    cetirizine (ZYRTEC) 10 MG tablet Take 10 mg by mouth once daily.    furosemide (LASIX) 20 MG tablet Take 20 mg by mouth 2 (two) times daily.     gabapentin (NEURONTIN) 100 MG capsule Take 100 mg by mouth 3 (three) times daily.     insulin NPH-insulin regular (NOVOLIN 70/30) 100 unit/mL (70-30) injection Inject 30 Units into the skin once daily. AND 10 UNITS QHS    insulin NPH-insulin regular, 70/30, (NOVOLIN 70/30) 100 unit/mL (70-30) injection Inject 10 Units into the skin every evening. AND 30 UNITS QAM    labetalol (NORMODYNE) 200 MG tablet Take 200 mg by mouth 2 (two) times daily.    losartan (COZAAR) 25 MG  tablet Take 25 mg by mouth once daily.     omeprazole (PRILOSEC) 40 MG capsule Take 40 mg by mouth once daily.    pravastatin (PRAVACHOL) 40 MG tablet Take 40 mg by mouth every evening.     ranitidine (ZANTAC) 150 MG tablet Take 150 mg by mouth 2 (two) times daily.     sevelamer carbonate (RENVELA) 800 mg Tab Take 1,600 mg by mouth once daily. With DINNER and 800 mg BID with other meals    sevelamer carbonate (RENVELA) 800 mg Tab Take 800 mg by mouth 2 (two) times daily. With meals, and 1600mg daily with the largest meal    vit B comp no.3/folic/C/biotin (TOM-ANA RX ORAL) Take 1 tablet by mouth once daily.    ondansetron (ZOFRAN-ODT) 8 MG TbDL Take 8 mg by mouth every 12 (twelve) hours as needed.    promethazine (PHENERGAN) 12.5 MG Tab Take 12.5 mg by mouth every 8 (eight) hours as needed.    [DISCONTINUED] ALOE VERA ORAL No Sig Provided    [DISCONTINUED] amlodipine (NORVASC) 5 MG tablet Take 1 tablet (5 mg total) by mouth 2 (two) times daily.    [DISCONTINUED] aspirin (ECOTRIN) 81 MG EC tablet Take 81 mg by mouth once daily.    [DISCONTINUED] aspirin (ECOTRIN) 81 MG EC tablet Take 81 mg by mouth.    [DISCONTINUED] azelastine (ASTELIN) 137 mcg (0.1 %) nasal spray 1 spray by Nasal route.    [DISCONTINUED] azelastine (ASTELIN) 137 mcg (0.1 %) nasal spray 1 spray by Nasal route.    [DISCONTINUED] benzonatate (TESSALON) 100 MG capsule Take 100 mg by mouth 3 (three) times daily as needed for Cough.    [DISCONTINUED] clemastine (TAVIST) 2.68 mg Tab Take 2.68 mg by mouth 2 (two) times daily as needed.    [DISCONTINUED] clemastine (TAVIST) 2.68 mg Tab Take 2.68 mg by mouth.    [DISCONTINUED] cloNIDine HCl 0.1 mg Tb12 Take 0.1 mg by mouth.    [DISCONTINUED] docusate sodium (COLACE) 50 MG capsule Take by mouth daily as needed for Constipation.    [DISCONTINUED] docusate sodium (COLACE) 50 MG capsule Take by mouth.    [DISCONTINUED] enalapril (VASOTEC) 10 MG tablet Take 10 mg by mouth 2 (two) times  daily.     [DISCONTINUED] enalapril (VASOTEC) 10 MG tablet Take 10 mg by mouth.    [DISCONTINUED] gabapentin (NEURONTIN) 300 MG capsule Take 300 mg by mouth.    [DISCONTINUED] hydrALAZINE (APRESOLINE) 50 MG tablet Take 1 tablet (50 mg total) by mouth every 8 (eight) hours.    [DISCONTINUED] hydrocodone-acetaminophen 7.5-325mg (NORCO) 7.5-325 mg per tablet Take 1 tablet by mouth.    [DISCONTINUED] hydrOXYzine HCl (ATARAX) 25 MG tablet Take 25 mg by mouth.    [DISCONTINUED] hydrOXYzine pamoate (VISTARIL) 25 MG Cap Take 25 mg by mouth 2 (two) times daily as needed.    [DISCONTINUED] insulin glargine (LANTUS) 100 unit/mL injection Inject 55 Units into the skin every evening. No Sig Provided    [DISCONTINUED] insulin glargine (LANTUS) 100 unit/mL injection Inject 55 Units into the skin.    [DISCONTINUED] losartan (COZAAR) 100 MG tablet Take 100 mg by mouth once daily.    [DISCONTINUED] metolazone (ZAROXOLYN) 2.5 MG tablet Take 2.5 mg by mouth 2 (two) times daily.     [DISCONTINUED] metOLazone (ZAROXOLYN) 2.5 MG tablet Take 2.5 mg by mouth.    [DISCONTINUED] metoprolol succinate (TOPROL-XL) 100 MG 24 hr tablet Take 100 mg by mouth 2 (two) times daily.    [DISCONTINUED] metoprolol succinate (TOPROL-XL) 100 MG 24 hr tablet Take 100 mg by mouth.    [DISCONTINUED] metoprolol succinate (TOPROL-XL) 200 MG 24 hr tablet Take 100 mg by mouth.    [DISCONTINUED] omega-3 fatty acids-fish oil 340-1,000 mg Cap Take 1 capsule by mouth once daily.    [DISCONTINUED] oxybutynin (DITROPAN-XL) 5 MG TR24 Take 10 mg by mouth once daily.    [DISCONTINUED] polyethylene glycol (COLYTE) 240-22.72-6.72 gram solution Use 17 g in 8 ounces of water orally daily    [DISCONTINUED] pravastatin (PRAVACHOL) 40 MG tablet Take 40 mg by mouth every evening.    [DISCONTINUED] ranitidine (ZANTAC) 150 MG tablet Take 150 mg by mouth 2 (two) times daily.    [DISCONTINUED] simethicone 40 mg Chew Take 40 mg by mouth every meal as needed.     [DISCONTINUED] tamoxifen (NOLVADEX) 20 MG Tab Take 20 mg by mouth once daily.    [DISCONTINUED] tamoxifen (NOLVADEX) 20 MG Tab Take 20 mg by mouth.     Family History     Problem Relation (Age of Onset)    Cancer Sister, Mother, Father, Sister    Diabetes Mother, Father    Hearing loss Mother        Tobacco Use    Smoking status: Never Smoker    Smokeless tobacco: Never Used   Substance and Sexual Activity    Alcohol use: No    Drug use: No    Sexual activity: Not on file     Review of Systems   Constitutional: Positive for fatigue. Negative for activity change, appetite change, chills, diaphoresis, fever and unexpected weight change.   HENT: Negative for congestion, ear pain, facial swelling, hearing loss, sore throat and trouble swallowing.    Eyes: Negative for pain and discharge.   Respiratory: Negative for cough, chest tightness, shortness of breath and wheezing.    Cardiovascular: Negative for chest pain, palpitations and leg swelling.   Gastrointestinal: Negative for abdominal pain, blood in stool, diarrhea, nausea and vomiting.   Endocrine: Negative for polydipsia, polyphagia and polyuria.   Genitourinary: Negative for difficulty urinating, dysuria, flank pain, frequency and urgency.   Musculoskeletal: Positive for arthralgias, back pain and gait problem. Negative for joint swelling, neck pain and neck stiffness.   Skin: Negative for rash and wound.   Allergic/Immunologic: Negative for environmental allergies and immunocompromised state.   Neurological: Positive for weakness. Negative for dizziness, seizures, syncope, speech difficulty, light-headedness, numbness and headaches.   Hematological: Negative for adenopathy.   Psychiatric/Behavioral: Negative for sleep disturbance and suicidal ideas. The patient is not nervous/anxious.    All other systems reviewed and are negative.    Objective:     Vital Signs (Most Recent):  Temp: 98.3 °F (36.8 °C) (10/23/19 0954)  Pulse: 61 (10/23/19 1500)  Resp: 20  (10/23/19 1430)  BP: (!) 104/53 (10/23/19 1500)  SpO2: (!) 88 % (10/23/19 1500) Vital Signs (24h Range):  Temp:  [98.3 °F (36.8 °C)] 98.3 °F (36.8 °C)  Pulse:  [61-71] 61  Resp:  [16-21] 20  SpO2:  [88 %-97 %] 88 %  BP: (101-163)/(50-70) 104/53     Weight: 81.6 kg (180 lb)  Body mass index is 29.05 kg/m².    Physical Exam   Constitutional: She is oriented to person, place, and time. She appears well-developed and well-nourished.   HENT:   Head: Normocephalic and atraumatic.   Eyes: Pupils are equal, round, and reactive to light. EOM are normal.   Neck: Normal range of motion. Neck supple.   Cardiovascular: Normal rate, regular rhythm and intact distal pulses.   Murmur heard.  Pulmonary/Chest: Effort normal and breath sounds normal. No stridor. No respiratory distress. She has no wheezes.   Abdominal: Soft. Bowel sounds are normal. She exhibits no distension. There is no tenderness.   Musculoskeletal: Normal range of motion. She exhibits tenderness.   Neurological: She is alert and oriented to person, place, and time.   Skin: Skin is warm and dry. Capillary refill takes less than 2 seconds.   Psychiatric: She has a normal mood and affect.   Nursing note and vitals reviewed.        CRANIAL NERVES     CN III, IV, VI   Pupils are equal, round, and reactive to light.  Extraocular motions are normal.        Significant Labs:   CBC:   Recent Labs   Lab 10/23/19  1040   WBC 4.95   HGB 11.3*   HCT 35.5*   PLT 84*     CMP:   Recent Labs   Lab 10/23/19  1040      K 4.7      CO2 23   *   BUN 46*   CREATININE 6.2*   CALCIUM 7.4*   PROT 6.2   ALBUMIN 2.9*   BILITOT 1.2*   ALKPHOS 122   AST 23   ALT 18   ANIONGAP 13   EGFRNONAA 5.8*       Significant Imaging: EKG: I have reviewed all pertinent results/findings within the past 24 hours and my personal findings are: NSR incomplete RBB, no acute ischemic changes     X-ray Thoracic Spine Ap And Lateral    Result Date: 10/23/2019  EXAMINATION: XR THORACIC SPINE AP  LATERAL CLINICAL HISTORY: Unspecified fall, initial encounter FINDINGS: Three views of the thoracic spine demonstrates the thoracic spine to be in satisfactory alignment.  There is multilevel disc space narrowing and anterior spurring.  The vertebral bodies are of normal height.  Pedicles are symmetrical. Paraspinous soft tissues are normal.  There is linear alveolar opacity in the right mid lung and right lower lobe which may represent atelectasis or infiltrate.     Degenerative changes of the thoracic spine without acute osseous abnormality Linear airspace disease in the right mid lung and right lower lobe suggestive of atelectasis or infiltrate Electronically signed by: Marycarmen Lee MD Date:    10/23/2019 Time:    12:28    X-ray Lumbar Spine Ap And Lateral    Result Date: 10/23/2019  EXAMINATION: XR LUMBAR SPINE AP AND LATERAL CLINICAL HISTORY: Fall injury; COMPARISON: None FINDINGS: Grade 1 anterolisthesis of L4 on L5 measuring 2-3 mm.  Lumbar spine alignment is otherwise normal.  Vertebral body heights are maintained.  Lower lumbar facet arthropathy most pronounced at L4-5.  Mild intervertebral disc space narrowing at L5-S1.  Atherosclerotic calcification of the aorta and branch vessels.  General osteopenia.     Negative for lumbar compression fracture. Lumbar spondylosis and lower lumbar facet arthropathy, most advanced at L4-5, with associated grade 1 anterolisthesis of L4 on L5. Electronically signed by: Romaine Ornelas MD Date:    10/23/2019 Time:    12:20    Ct Thoracic Spine Without Contrast    Result Date: 10/23/2019  EXAMINATION: CT THORACIC SPINE WITHOUT CONTRAST CLINICAL HISTORY: Polytrauma, critical, T/L spine inj suspected; TECHNIQUE: CMS Mandated Quality Data-CT Radiation Dose-436 All CT scans at this facility dose modulation, iterative reconstruction, and or weight-based dosing when appropriate to reduce radiation dose to as low as reasonably achievable. COMPARISON: Thoracic spine radiography  same day FINDINGS: Thoracic spine alignment is within normal limits.  Thoracic vertebral body heights are maintained.  No acute fracture of the thoracic spine is evident.  Mild multilevel thoracic disc space narrowing with osteophyte formation.  Disc calcifications noted at T7-8, T8-9, and T9-10.  No CT evidence of osseous neural foramen or spinal canal compromise. Limited images through the chest demonstrate a moderate size right pleural effusion layering dependently within the chest with associated compressive atelectasis.  Atherosclerotic calcification of the aorta.     Negative for acute thoracic spine fracture. Mild thoracic spondylosis. Right pleural effusion. Electronically signed by: Romaine Ornelas MD Date:    10/23/2019 Time:    13:30    Ct Lumbar Spine Without Contrast    Result Date: 10/23/2019  EXAMINATION: CT LUMBAR SPINE WITHOUT CONTRAST CLINICAL HISTORY: Polytrauma, critical, T/L spine inj suspected; TECHNIQUE: CMS Mandated Quality Data-CT Radiation Dose-436 All CT scans at this facility dose modulation, iterative reconstruction, and or weight-based dosing when appropriate to reduce radiation dose to as low as reasonably achievable. COMPARISON: Lumbar spine radiography same day FINDINGS: Transitional lumbosacral vertebral body is labeled L5 for the purposes of this exam.  Grade 1 anterolisthesis of L4 on L5 measures 3 mm.  Lumbar spine alignment is otherwise normal.  Vertebral body heights are maintained.  No acute fracture of the lumbar spine facet arthropathy is severe at L4-5. Right pleural effusion noted.  Limited images through the retroperitoneum demonstrate extensive atherosclerosis of the aorta and its branch vessels.  Mild bilateral SI joint degenerative changes.  General osteopenia.     No acute osseous abnormality of the lumbar spine. Severe L4-5 facet arthropathy with degenerative grade 1 anterolisthesis of L4 on L5. Transitional lumbosacral vertebral body which is labeled L5 for the  purposes of this exam. Electronically signed by: Romaine Ornelas MD Date:    10/23/2019 Time:    13:32    Ct Pelvis Without Contrast    Result Date: 10/23/2019  CMS MANDATED QUALITY DATA - CT RADIATION - 436 All CT scans at this facility utilize dose modulation, iterative reconstruction, and/or weight based dosing when appropriate to reduce radiation dose to as low as reasonably achievable. EXAMINATION: CT PELVIS WITHOUT CONTRAST CLINICAL HISTORY: Fall injury with low back pain; TECHNIQUE: Pelvic CT without IV contrast COMPARISON: Radiographs dated 10/23/2019 FINDINGS: There is acute nondisplaced fracture of the right inferior pubic ramus.  There is a questionable nondisplaced fracture of the anterior column of the right acetabulum.  Is uncertain if this represents a vascular channel or fracture.  There are no additional fractures.  There is osteopenia.  There pseudoarthrosis of the transverse processes of L5 on S1.  There is disc space narrowing at L4-5 and L5-S1. There is sigmoid diverticulosis without diverticulitis.  The the bladder is collapsed.  The patient has had a hysterectomy.  There is diffuse vascular calcification.  There is anasarca.     Acute nondisplaced fracture of the inferior right pubic ramus with questionable nondisplaced fracture of the anterior column of the right acetabulum. Colonic diverticulosis Diffuse anasarca Electronically signed by: Marycarmen Lee MD Date:    10/23/2019 Time:    13:41    X-ray Pelvis Routine Ap    Result Date: 10/23/2019  EXAMINATION: XR PELVIS ROUTINE AP CLINICAL HISTORY: Unspecified fall, initial encounter FINDINGS: There is osteopenia.  The SI joints and hip joints are symmetrical.  There is pseudoarthrosis of the right transverse process of L5 on S1. There is deformity of the right superior and inferior pubic rami spine presumably secondary to prior trauma.  There is no acute fracture.  There is moderate vascular calcification.     Osteopenia with deformity of the  right superior inferior pubic rami sub presumably secondary to prior trauma. Diffuse vascular calcification Electronically signed by: Marycarmen Lee MD Date:    10/23/2019 Time:    12:26

## 2019-10-23 NOTE — ED NOTES
Bed: [place] [address]  Expected date:   Expected time:   Means of arrival:   Comments:  KAYODE Valderrama

## 2019-10-23 NOTE — CONSULTS
Nephrology Consult Note        Patient Name: Brandi Camacho  MRN: [id number]    Patient Class: Emergency   Admission Date: 10/23/2019  Length of Stay: 0 days  Date of Service: 10/23/2019    Attending Physician: Wagner Saldaña Jr., *  Primary Care Provider: Greg Skinner Iv, MD    Reason for Consult: esrd/anemia/htn/shpt/fall with pelvic fracture    SUBJECTIVE:     HPI: 79F with breast cancer, DM2, GERD, HTN, ESRD on HD MWF, anemia, presents after a fall at home with pains and aches. She missed HD today. Imaging shows acute nondisplaced fracture of the inferior right pubic ramus with questionable nondisplaced fracture of the anterior column of the right acetabulum. Renal consulted for co-management.    Past Medical History:   Diagnosis Date    Anemia     sees Dr. Loyola and Dr. Turner    Anticoagulant long-term use     aspirin    Arthritis     Cancer     left breast ca    Diabetes mellitus     Encounter for blood transfusion     GERD (gastroesophageal reflux disease)     Hypertension     sees Dr. Farmre    Renal disorder     [facility] on hemodialysis MWF    Urinary tract infection      Past Surgical History:   Procedure Laterality Date    AV FISTULA PLACEMENT Right     BREAST SURGERY  2011    left mastectomy [facility] dr begum     SECTION      x2    endoscopic precancerous adenomatous      [facility], ampulla of vater    HYSTERECTOMY      EDI    left knee patella FRACTURE      MODIFIED RADICAL MASTECTOMY W/ AXILLARY LYMPH NODE DISSECTION      right knee orthoscope       Family History   Problem Relation Age of Onset    Cancer Sister     Cancer Mother         pancreatic    Diabetes Mother     Hearing loss Mother     Cancer Father         bladder    Diabetes Father     Cancer Sister         half sister, breast     Social History     Tobacco Use    Smoking status: Never Smoker    Smokeless tobacco: Never Used   Substance Use Topics    Alcohol use: No    Drug use: No        Review of patient's allergies indicates:   Allergen Reactions    Metformin Rash and Nausea And Vomiting     Other reaction(s): Unknown    Bactrim [sulfamethoxazole-trimethoprim] Rash    Sulfamethoxazole Rash     Other reaction(s): Rash  Other reaction(s): Rash    Trimethoprim Rash     Other reaction(s): Rash  Other reaction(s): Rash       Outpatient meds:  No current facility-administered medications on file prior to encounter.      Current Outpatient Medications on File Prior to Encounter   Medication Sig Dispense Refill    cetirizine (ZYRTEC) 10 MG tablet Take 10 mg by mouth once daily.      furosemide (LASIX) 20 MG tablet Take 20 mg by mouth 2 (two) times daily.       gabapentin (NEURONTIN) 100 MG capsule Take 100 mg by mouth 3 (three) times daily.       insulin NPH-insulin regular (NOVOLIN 70/30) 100 unit/mL (70-30) injection Inject 30 Units into the skin once daily. AND 10 UNITS QHS      insulin NPH-insulin regular, 70/30, (NOVOLIN 70/30) 100 unit/mL (70-30) injection Inject 10 Units into the skin every evening. AND 30 UNITS QAM      labetalol (NORMODYNE) 200 MG tablet Take 200 mg by mouth 2 (two) times daily.      losartan (COZAAR) 25 MG tablet Take 25 mg by mouth once daily.       omeprazole (PRILOSEC) 40 MG capsule Take 40 mg by mouth once daily.      pravastatin (PRAVACHOL) 40 MG tablet Take 40 mg by mouth every evening.       ranitidine (ZANTAC) 150 MG tablet Take 150 mg by mouth 2 (two) times daily.       sevelamer carbonate (RENVELA) 800 mg Tab Take 1,600 mg by mouth once daily. With DINNER and 800 mg BID with other meals      sevelamer carbonate (RENVELA) 800 mg Tab Take 800 mg by mouth 2 (two) times daily. With meals, and 1600mg daily with the largest meal      vit B comp no.3/folic/C/biotin (TOM-ANA RX ORAL) Take 1 tablet by mouth once daily.      ondansetron (ZOFRAN-ODT) 8 MG TbDL Take 8 mg by mouth every 12 (twelve) hours as needed.      promethazine (PHENERGAN) 12.5 MG  Tab Take 12.5 mg by mouth every 8 (eight) hours as needed.      [DISCONTINUED] ALOE VERA ORAL No Sig Provided      [DISCONTINUED] amlodipine (NORVASC) 5 MG tablet Take 1 tablet (5 mg total) by mouth 2 (two) times daily. 60 tablet 0    [DISCONTINUED] aspirin (ECOTRIN) 81 MG EC tablet Take 81 mg by mouth once daily.      [DISCONTINUED] aspirin (ECOTRIN) 81 MG EC tablet Take 81 mg by mouth.      [DISCONTINUED] azelastine (ASTELIN) 137 mcg (0.1 %) nasal spray 1 spray by Nasal route.      [DISCONTINUED] azelastine (ASTELIN) 137 mcg (0.1 %) nasal spray 1 spray by Nasal route.      [DISCONTINUED] benzonatate (TESSALON) 100 MG capsule Take 100 mg by mouth 3 (three) times daily as needed for Cough.      [DISCONTINUED] clemastine (TAVIST) 2.68 mg Tab Take 2.68 mg by mouth 2 (two) times daily as needed.      [DISCONTINUED] clemastine (TAVIST) 2.68 mg Tab Take 2.68 mg by mouth.      [DISCONTINUED] cloNIDine HCl 0.1 mg Tb12 Take 0.1 mg by mouth.      [DISCONTINUED] docusate sodium (COLACE) 50 MG capsule Take by mouth daily as needed for Constipation.      [DISCONTINUED] docusate sodium (COLACE) 50 MG capsule Take by mouth.      [DISCONTINUED] enalapril (VASOTEC) 10 MG tablet Take 10 mg by mouth 2 (two) times daily.       [DISCONTINUED] enalapril (VASOTEC) 10 MG tablet Take 10 mg by mouth.      [DISCONTINUED] gabapentin (NEURONTIN) 300 MG capsule Take 300 mg by mouth.      [DISCONTINUED] hydrALAZINE (APRESOLINE) 50 MG tablet Take 1 tablet (50 mg total) by mouth every 8 (eight) hours. 90 tablet 0    [DISCONTINUED] hydrocodone-acetaminophen 7.5-325mg (NORCO) 7.5-325 mg per tablet Take 1 tablet by mouth.      [DISCONTINUED] hydrOXYzine HCl (ATARAX) 25 MG tablet Take 25 mg by mouth.      [DISCONTINUED] hydrOXYzine pamoate (VISTARIL) 25 MG Cap Take 25 mg by mouth 2 (two) times daily as needed.      [DISCONTINUED] insulin glargine (LANTUS) 100 unit/mL injection Inject 55 Units into the skin every evening. No Sig  Provided      [DISCONTINUED] insulin glargine (LANTUS) 100 unit/mL injection Inject 55 Units into the skin.      [DISCONTINUED] losartan (COZAAR) 100 MG tablet Take 100 mg by mouth once daily.      [DISCONTINUED] metolazone (ZAROXOLYN) 2.5 MG tablet Take 2.5 mg by mouth 2 (two) times daily.       [DISCONTINUED] metOLazone (ZAROXOLYN) 2.5 MG tablet Take 2.5 mg by mouth.      [DISCONTINUED] metoprolol succinate (TOPROL-XL) 100 MG 24 hr tablet Take 100 mg by mouth 2 (two) times daily.      [DISCONTINUED] metoprolol succinate (TOPROL-XL) 100 MG 24 hr tablet Take 100 mg by mouth.      [DISCONTINUED] metoprolol succinate (TOPROL-XL) 200 MG 24 hr tablet Take 100 mg by mouth.      [DISCONTINUED] omega-3 fatty acids-fish oil 340-1,000 mg Cap Take 1 capsule by mouth once daily. 30 capsule 0    [DISCONTINUED] oxybutynin (DITROPAN-XL) 5 MG TR24 Take 10 mg by mouth once daily.      [DISCONTINUED] polyethylene glycol (COLYTE) 240-22.72-6.72 gram solution Use 17 g in 8 ounces of water orally daily 4000 mL 0    [DISCONTINUED] pravastatin (PRAVACHOL) 40 MG tablet Take 40 mg by mouth every evening.      [DISCONTINUED] ranitidine (ZANTAC) 150 MG tablet Take 150 mg by mouth 2 (two) times daily.      [DISCONTINUED] simethicone 40 mg Chew Take 40 mg by mouth every meal as needed. 100 tablet 0    [DISCONTINUED] tamoxifen (NOLVADEX) 20 MG Tab Take 20 mg by mouth once daily.      [DISCONTINUED] tamoxifen (NOLVADEX) 20 MG Tab Take 20 mg by mouth.         Scheduled meds:   morphine  2 mg Intravenous ED 1 Time       Infusions:      PRN meds:      Review of Systems:  Review of Systems   Constitutional: Negative for chills, fever, malaise/fatigue and weight loss.   HENT: Negative for hearing loss and nosebleeds.    Eyes: Negative for blurred vision, double vision and photophobia.   Respiratory: Negative for cough, shortness of breath and wheezing.    Cardiovascular: Negative for chest pain, palpitations and leg swelling.    Gastrointestinal: Negative for abdominal pain, constipation, diarrhea, heartburn, nausea and vomiting.   Genitourinary: Negative for dysuria, frequency and urgency.   Musculoskeletal: Negative for falls, joint pain and myalgias.   Skin: Negative for itching and rash.   Neurological: Negative for dizziness, speech change, focal weakness, loss of consciousness and headaches.   Endo/Heme/Allergies: Does not bruise/bleed easily.   Psychiatric/Behavioral: Negative for depression and substance abuse. The patient is not nervous/anxious.        OBJECTIVE:     Vital Signs and IO (Last 24H):  Temp:  [98.3 °F (36.8 °C)]   Pulse:  [61-71]   Resp:  [16-21]   BP: (103-163)/(53-70)   SpO2:  [92 %-97 %]   No intake/output data recorded.    Wt Readings from Last 5 Encounters:   10/23/19 81.6 kg (180 lb)   09/13/18 79.8 kg (176 lb)   03/15/18 80.3 kg (177 lb)   04/20/16 84.8 kg (187 lb)   11/10/15 81.6 kg (180 lb)         Physical Exam:  Physical Exam   Constitutional: She is oriented to person, place, and time. She appears well-developed and well-nourished.   HENT:   Head: Normocephalic and atraumatic.   Mouth/Throat: Oropharynx is clear and moist.   Eyes: Pupils are equal, round, and reactive to light. EOM are normal. No scleral icterus.   Neck: Neck supple.   Cardiovascular: Normal rate and regular rhythm.   Pulmonary/Chest: Effort normal. No stridor. No respiratory distress.   Abdominal: Soft. She exhibits no distension.   Musculoskeletal: Normal range of motion. She exhibits no edema or deformity.   Neurological: She is alert and oriented to person, place, and time. No cranial nerve deficit.   Skin: Skin is warm and dry. No rash noted. She is not diaphoretic. No erythema.   Psychiatric: She has a normal mood and affect. Her behavior is normal.       Body mass index is 29.05 kg/m².    Laboratory:  Recent Labs   Lab 10/23/19  1040      K 4.7      CO2 23   BUN 46*   CREATININE 6.2*   ESTGFRAFRICA 6.7*   EGFRNONAA 5.8*    *       Recent Labs   Lab 10/23/19  1040   CALCIUM 7.4*   ALBUMIN 2.9*             No results for input(s): POCTGLUCOSE in the last 168 hours.          Recent Labs   Lab 10/23/19  1040   WBC 4.95   HGB 11.3*   HCT 35.5*   PLT 84*   *   MCHC 31.8*   MONO 9.1  0.5       Recent Labs   Lab 10/23/19  1040   BILITOT 1.2*   PROT 6.2   ALBUMIN 2.9*   ALKPHOS 122   ALT 18   AST 23             Invalid input(s): LEUCOCYTESUR          Microbiology Results (last 7 days)     ** No results found for the last 168 hours. **            ASSESSMENT/PLAN:     ESRD on HD MWF via [facility]  Continue current dialysis prescription.  Next HD on today or tomorrow.  Renal diet - low K, low phos.  No IVs or BP checks on access and/or non-dominant arm.    Anemia of CKD  Hgb and HCT are acceptable. Monitor.  Will provide SARAH BETH and/or IV iron PRN.    MBD / Secondary HPT  Ca, phos, PTH and vitamin D levels are acceptable.   Phos binders, vitamin D analogues and calcimimetics as needed.    HTN  BP seem controlled.   Tolerate asymptomatic HTN up to -160.  Continue home meds.  Low sodium diet.    Fall with pelvic fracture.  Plan per Surg and primary team.    Thank you for allowing us to participate in the care of your patient!   We will follow the patient and provide recommendations as needed.    Lavell Wolf MD    [facility]  [address] [place] [address]  [place] [id number]    [phone] - tel  [phone] - fax    10/23/2019 2:46 PM

## 2019-10-23 NOTE — H&P
"[facility] Medicine  History & Physical    DOS: 10/23/2019  2:33 PM    Patient Name: Brandi Camacho  MRN: [id number]  Admission Date: 10/23/2019  Attending Physician: Dr. Bullock  Primary Care Provider: Greg Skinner Iv, MD         Patient information was obtained from patient, relative(s) and ER records   Case personally discussed with Dr. Saldaña - DERIC NERI.     Subjective:     Principal Problem:Intractable pain    Chief Complaint:   Chief Complaint   Patient presents with    Fall     "Legs just gave out"    Back Pain        HPI: Ms. Camacho presents today with complaints of back pain. It is severe. It is associated with a mechanical fall last night and weakness. She denies head trauma, LOC, fever, chills, N/V/D, or dizziness. She's currently AAOx4 and states she fell last night that her legs "just gave out". She's in significant pain when she tries to move. She has a history of ESRD on HD MWF, HTN, IDDM, GERD, arthritis, and osteopenia. She lives at home with her elderly .    Past Medical History:   Diagnosis Date    Anemia     sees Dr. Loyola and Dr. Turner    Anticoagulant long-term use     aspirin    Arthritis     Cancer     left breast ca    Diabetes mellitus     Encounter for blood transfusion     GERD (gastroesophageal reflux disease)     Hypertension     sees Dr. Farmer    Renal disorder     CRF on hemodialysis MWF    Urinary tract infection        Past Surgical History:   Procedure Laterality Date    AV FISTULA PLACEMENT Right     BREAST SURGERY  2011    left mastectomy [facility] dr begum     SECTION      x2    [facility] precancerous adenomatous      [facility], ampulla of vater    HYSTERECTOMY      EDI    left knee patella FRACTURE      MODIFIED RADICAL MASTECTOMY W/ AXILLARY LYMPH NODE DISSECTION      right knee orthoscope         Review of patient's allergies indicates:   Allergen Reactions    Metformin Rash and Nausea And " Vomiting     Other reaction(s): Unknown    Bactrim [sulfamethoxazole-trimethoprim] Rash    Sulfamethoxazole Rash     Other reaction(s): Rash  Other reaction(s): Rash    Trimethoprim Rash     Other reaction(s): Rash  Other reaction(s): Rash       No current facility-administered medications on file prior to encounter.      Current Outpatient Medications on File Prior to Encounter   Medication Sig    cetirizine (ZYRTEC) 10 MG tablet Take 10 mg by mouth once daily.    furosemide (LASIX) 20 MG tablet Take 20 mg by mouth 2 (two) times daily.     gabapentin (NEURONTIN) 100 MG capsule Take 100 mg by mouth 3 (three) times daily.     insulin NPH-insulin regular (NOVOLIN 70/30) 100 unit/mL (70-30) injection Inject 30 Units into the skin once daily. AND 10 UNITS QHS    insulin NPH-insulin regular, 70/30, (NOVOLIN 70/30) 100 unit/mL (70-30) injection Inject 10 Units into the skin every evening. AND 30 UNITS QAM    labetalol (NORMODYNE) 200 MG tablet Take 200 mg by mouth 2 (two) times daily.    losartan (COZAAR) 25 MG tablet Take 25 mg by mouth once daily.     omeprazole (PRILOSEC) 40 MG capsule Take 40 mg by mouth once daily.    pravastatin (PRAVACHOL) 40 MG tablet Take 40 mg by mouth every evening.     ranitidine (ZANTAC) 150 MG tablet Take 150 mg by mouth 2 (two) times daily.     sevelamer carbonate (RENVELA) 800 mg Tab Take 1,600 mg by mouth once daily. With DINNER and 800 mg BID with other meals    sevelamer carbonate (RENVELA) 800 mg Tab Take 800 mg by mouth 2 (two) times daily. With meals, and 1600mg daily with the largest meal    vit B comp no.3/folic/C/biotin (TOM-ANA RX ORAL) Take 1 tablet by mouth once daily.    ondansetron (ZOFRAN-ODT) 8 MG TbDL Take 8 mg by mouth every 12 (twelve) hours as needed.    promethazine (PHENERGAN) 12.5 MG Tab Take 12.5 mg by mouth every 8 (eight) hours as needed.    [DISCONTINUED] ALOE VERA ORAL No Sig Provided    [DISCONTINUED] amlodipine (NORVASC) 5 MG tablet Take  1 tablet (5 mg total) by mouth 2 (two) times daily.    [DISCONTINUED] aspirin (ECOTRIN) 81 MG EC tablet Take 81 mg by mouth once daily.    [DISCONTINUED] aspirin (ECOTRIN) 81 MG EC tablet Take 81 mg by mouth.    [DISCONTINUED] azelastine (ASTELIN) 137 mcg (0.1 %) nasal spray 1 spray by Nasal route.    [DISCONTINUED] azelastine (ASTELIN) 137 mcg (0.1 %) nasal spray 1 spray by Nasal route.    [DISCONTINUED] benzonatate (TESSALON) 100 MG capsule Take 100 mg by mouth 3 (three) times daily as needed for Cough.    [DISCONTINUED] clemastine (TAVIST) 2.68 mg Tab Take 2.68 mg by mouth 2 (two) times daily as needed.    [DISCONTINUED] clemastine (TAVIST) 2.68 mg Tab Take 2.68 mg by mouth.    [DISCONTINUED] cloNIDine HCl 0.1 mg Tb12 Take 0.1 mg by mouth.    [DISCONTINUED] docusate sodium (COLACE) 50 MG capsule Take by mouth daily as needed for Constipation.    [DISCONTINUED] docusate sodium (COLACE) 50 MG capsule Take by mouth.    [DISCONTINUED] enalapril (VASOTEC) 10 MG tablet Take 10 mg by mouth 2 (two) times daily.     [DISCONTINUED] enalapril (VASOTEC) 10 MG tablet Take 10 mg by mouth.    [DISCONTINUED] gabapentin (NEURONTIN) 300 MG capsule Take 300 mg by mouth.    [DISCONTINUED] hydrALAZINE (APRESOLINE) 50 MG tablet Take 1 tablet (50 mg total) by mouth every 8 (eight) hours.    [DISCONTINUED] hydrocodone-acetaminophen 7.5-325mg (NORCO) 7.5-325 mg per tablet Take 1 tablet by mouth.    [DISCONTINUED] hydrOXYzine HCl (ATARAX) 25 MG tablet Take 25 mg by mouth.    [DISCONTINUED] hydrOXYzine pamoate (VISTARIL) 25 MG Cap Take 25 mg by mouth 2 (two) times daily as needed.    [DISCONTINUED] insulin glargine (LANTUS) 100 unit/mL injection Inject 55 Units into the skin every evening. No Sig Provided    [DISCONTINUED] insulin glargine (LANTUS) 100 unit/mL injection Inject 55 Units into the skin.    [DISCONTINUED] losartan (COZAAR) 100 MG tablet Take 100 mg by mouth once daily.    [DISCONTINUED] metolazone  (ZAROXOLYN) 2.5 MG tablet Take 2.5 mg by mouth 2 (two) times daily.     [DISCONTINUED] metOLazone (ZAROXOLYN) 2.5 MG tablet Take 2.5 mg by mouth.    [DISCONTINUED] metoprolol succinate (TOPROL-XL) 100 MG 24 hr tablet Take 100 mg by mouth 2 (two) times daily.    [DISCONTINUED] metoprolol succinate (TOPROL-XL) 100 MG 24 hr tablet Take 100 mg by mouth.    [DISCONTINUED] metoprolol succinate (TOPROL-XL) 200 MG 24 hr tablet Take 100 mg by mouth.    [DISCONTINUED] omega-3 fatty acids-fish oil 340-1,000 mg Cap Take 1 capsule by mouth once daily.    [DISCONTINUED] oxybutynin (DITROPAN-XL) 5 MG TR24 Take 10 mg by mouth once daily.    [DISCONTINUED] polyethylene glycol (COLYTE) 240-22.72-6.72 gram solution Use 17 g in 8 ounces of water orally daily    [DISCONTINUED] pravastatin (PRAVACHOL) 40 MG tablet Take 40 mg by mouth every evening.    [DISCONTINUED] ranitidine (ZANTAC) 150 MG tablet Take 150 mg by mouth 2 (two) times daily.    [DISCONTINUED] simethicone 40 mg Chew Take 40 mg by mouth every meal as needed.    [DISCONTINUED] tamoxifen (NOLVADEX) 20 MG Tab Take 20 mg by mouth once daily.    [DISCONTINUED] tamoxifen (NOLVADEX) 20 MG Tab Take 20 mg by mouth.     Family History     Problem Relation (Age of Onset)    Cancer Sister, Mother, Father, Sister    Diabetes Mother, Father    Hearing loss Mother        Tobacco Use    Smoking status: Never Smoker    Smokeless tobacco: Never Used   Substance and Sexual Activity    Alcohol use: No    Drug use: No    Sexual activity: Not on file     Review of Systems   Constitutional: Positive for fatigue. Negative for activity change, appetite change, chills, diaphoresis, fever and unexpected weight change.   HENT: Negative for congestion, ear pain, facial swelling, hearing loss, sore throat and trouble swallowing.    Eyes: Negative for pain and discharge.   Respiratory: Negative for cough, chest tightness, shortness of breath and wheezing.    Cardiovascular: Negative for  chest pain, palpitations and leg swelling.   Gastrointestinal: Negative for abdominal pain, blood in stool, diarrhea, nausea and vomiting.   Endocrine: Negative for polydipsia, polyphagia and polyuria.   Genitourinary: Negative for difficulty urinating, dysuria, flank pain, frequency and urgency.   Musculoskeletal: Positive for arthralgias, back pain and gait problem. Negative for joint swelling, neck pain and neck stiffness.   Skin: Negative for rash and wound.   Allergic/Immunologic: Negative for environmental allergies and immunocompromised state.   Neurological: Positive for weakness. Negative for dizziness, seizures, syncope, speech difficulty, light-headedness, numbness and headaches.   Hematological: Negative for adenopathy.   Psychiatric/Behavioral: Negative for sleep disturbance and suicidal ideas. The patient is not nervous/anxious.    All other systems reviewed and are negative.    Objective:     Vital Signs (Most Recent):  Temp: 98.3 °F (36.8 °C) (10/23/19 0954)  Pulse: 61 (10/23/19 1500)  Resp: 20 (10/23/19 1430)  BP: (!) 104/53 (10/23/19 1500)  SpO2: (!) 88 % (10/23/19 1500) Vital Signs (24h Range):  Temp:  [98.3 °F (36.8 °C)] 98.3 °F (36.8 °C)  Pulse:  [61-71] 61  Resp:  [16-21] 20  SpO2:  [88 %-97 %] 88 %  BP: (101-163)/(50-70) 104/53     Weight: 81.6 kg (180 lb)  Body mass index is 29.05 kg/m².    Physical Exam   Constitutional: She is oriented to person, place, and time. She appears well-developed and well-nourished.   HENT:   Head: Normocephalic and atraumatic.   Eyes: Pupils are equal, round, and reactive to light. EOM are normal.   Neck: Normal range of motion. Neck supple.   Cardiovascular: Normal rate, regular rhythm and intact distal pulses.   Murmur heard.  Pulmonary/Chest: Effort normal and breath sounds normal. No stridor. No respiratory distress. She has no wheezes.   Abdominal: Soft. Bowel sounds are normal. She exhibits no distension. There is no tenderness.   Musculoskeletal: Normal  range of motion. She exhibits tenderness.   Neurological: She is alert and oriented to person, place, and time.   Skin: Skin is warm and dry. Capillary refill takes less than 2 seconds.   Psychiatric: She has a normal mood and affect.   Nursing note and vitals reviewed.        CRANIAL NERVES     CN III, IV, VI   Pupils are equal, round, and reactive to light.  Extraocular motions are normal.        Significant Labs:   CBC:   Recent Labs   Lab 10/23/19  1040   WBC 4.95   HGB 11.3*   HCT 35.5*   PLT 84*     CMP:   Recent Labs   Lab 10/23/19  1040      K 4.7      CO2 23   *   BUN 46*   CREATININE 6.2*   CALCIUM 7.4*   PROT 6.2   ALBUMIN 2.9*   BILITOT 1.2*   ALKPHOS 122   AST 23   ALT 18   ANIONGAP 13   EGFRNONAA 5.8*       Significant Imaging: EKG: I have reviewed all pertinent results/findings within the past 24 hours and my personal findings are: NSR incomplete RBB, no acute ischemic changes     X-ray Thoracic Spine Ap And Lateral    Result Date: 10/23/2019  EXAMINATION: XR THORACIC SPINE AP LATERAL CLINICAL HISTORY: Unspecified fall, initial encounter FINDINGS: Three views of the thoracic spine demonstrates the thoracic spine to be in satisfactory alignment.  There is multilevel disc space narrowing and anterior spurring.  The vertebral bodies are of normal height.  Pedicles are symmetrical. Paraspinous soft tissues are normal.  There is linear alveolar opacity in the right mid lung and right lower lobe which may represent atelectasis or infiltrate.     Degenerative changes of the thoracic spine without acute osseous abnormality Linear airspace disease in the right mid lung and right lower lobe suggestive of atelectasis or infiltrate Electronically signed by: Marycarmen Lee MD Date:    10/23/2019 Time:    12:28    X-ray Lumbar Spine Ap And Lateral    Result Date: 10/23/2019  EXAMINATION: XR LUMBAR SPINE AP AND LATERAL CLINICAL HISTORY: Fall injury; COMPARISON: None FINDINGS: Grade 1  anterolisthesis of L4 on L5 measuring 2-3 mm.  Lumbar spine alignment is otherwise normal.  Vertebral body heights are maintained.  Lower lumbar facet arthropathy most pronounced at L4-5.  Mild intervertebral disc space narrowing at L5-S1.  Atherosclerotic calcification of the aorta and branch vessels.  General osteopenia.     Negative for lumbar compression fracture. Lumbar spondylosis and lower lumbar facet arthropathy, most advanced at L4-5, with associated grade 1 anterolisthesis of L4 on L5. Electronically signed by: Romaine Ornelas MD Date:    10/23/2019 Time:    12:20    Ct Thoracic Spine Without Contrast    Result Date: 10/23/2019  EXAMINATION: CT THORACIC SPINE WITHOUT CONTRAST CLINICAL HISTORY: Polytrauma, critical, T/L spine inj suspected; TECHNIQUE: CMS Mandated Quality Data-CT Radiation Dose-436 All CT scans at this facility dose modulation, iterative reconstruction, and or weight-based dosing when appropriate to reduce radiation dose to as low as reasonably achievable. COMPARISON: Thoracic spine radiography same day FINDINGS: Thoracic spine alignment is within normal limits.  Thoracic vertebral body heights are maintained.  No acute fracture of the thoracic spine is evident.  Mild multilevel thoracic disc space narrowing with osteophyte formation.  Disc calcifications noted at T7-8, T8-9, and T9-10.  No CT evidence of osseous neural foramen or spinal canal compromise. Limited images through the chest demonstrate a moderate size right pleural effusion layering dependently within the chest with associated compressive atelectasis.  Atherosclerotic calcification of the aorta.     Negative for acute thoracic spine fracture. Mild thoracic spondylosis. Right pleural effusion. Electronically signed by: Romaine Ornelas MD Date:    10/23/2019 Time:    13:30    Ct Lumbar Spine Without Contrast    Result Date: 10/23/2019  EXAMINATION: CT LUMBAR SPINE WITHOUT CONTRAST CLINICAL HISTORY: Polytrauma, critical, T/L  spine inj suspected; TECHNIQUE: CMS Mandated Quality Data-CT Radiation Dose-436 All CT scans at this facility dose modulation, iterative reconstruction, and or weight-based dosing when appropriate to reduce radiation dose to as low as reasonably achievable. COMPARISON: Lumbar spine radiography same day FINDINGS: Transitional lumbosacral vertebral body is labeled L5 for the purposes of this exam.  Grade 1 anterolisthesis of L4 on L5 measures 3 mm.  Lumbar spine alignment is otherwise normal.  Vertebral body heights are maintained.  No acute fracture of the lumbar spine facet arthropathy is severe at L4-5. Right pleural effusion noted.  Limited images through the retroperitoneum demonstrate extensive atherosclerosis of the aorta and its branch vessels.  Mild bilateral SI joint degenerative changes.  General osteopenia.     No acute osseous abnormality of the lumbar spine. Severe L4-5 facet arthropathy with degenerative grade 1 anterolisthesis of L4 on L5. Transitional lumbosacral vertebral body which is labeled L5 for the purposes of this exam. Electronically signed by: Romaine Ornelas MD Date:    10/23/2019 Time:    13:32    Ct Pelvis Without Contrast    Result Date: 10/23/2019  CMS MANDATED QUALITY DATA - CT RADIATION - 436 All CT scans at this facility utilize dose modulation, iterative reconstruction, and/or weight based dosing when appropriate to reduce radiation dose to as low as reasonably achievable. EXAMINATION: CT PELVIS WITHOUT CONTRAST CLINICAL HISTORY: Fall injury with low back pain; TECHNIQUE: Pelvic CT without IV contrast COMPARISON: Radiographs dated 10/23/2019 FINDINGS: There is acute nondisplaced fracture of the right inferior pubic ramus.  There is a questionable nondisplaced fracture of the anterior column of the right acetabulum.  Is uncertain if this represents a vascular channel or fracture.  There are no additional fractures.  There is osteopenia.  There pseudoarthrosis of the transverse  processes of L5 on S1.  There is disc space narrowing at L4-5 and L5-S1. There is sigmoid diverticulosis without diverticulitis.  The the bladder is collapsed.  The patient has had a hysterectomy.  There is diffuse vascular calcification.  There is anasarca.     Acute nondisplaced fracture of the inferior right pubic ramus with questionable nondisplaced fracture of the anterior column of the right acetabulum. Colonic diverticulosis Diffuse anasarca Electronically signed by: Marycarmen Lee MD Date:    10/23/2019 Time:    13:41    X-ray Pelvis Routine Ap    Result Date: 10/23/2019  EXAMINATION: XR PELVIS ROUTINE AP CLINICAL HISTORY: Unspecified fall, initial encounter FINDINGS: There is osteopenia.  The SI joints and hip joints are symmetrical.  There is pseudoarthrosis of the right transverse process of L5 on S1. There is deformity of the right superior and inferior pubic rami spine presumably secondary to prior trauma.  There is no acute fracture.  There is moderate vascular calcification.     Osteopenia with deformity of the right superior inferior pubic rami sub presumably secondary to prior trauma. Diffuse vascular calcification Electronically signed by: Marycarmen Lee MD Date:    10/23/2019 Time:    12:26      Assessment/Plan:     * Intractable pain  Admit to med/surg   Pain control with low dose norco and low dose IV morphine for breakthrough pain         Closed fracture of right inferior pubic ramus  Consult ortho - suspect there will be conservative management   Consult PT to eval and treat - HH with PT vs. Inpatient PT?  SCDs for DVT prophylaxis   Progressive mobility protocol   CT and XRs completed      ESRD (end stage renal disease)  Consult Dr. Wolf for HD management  Scheduled for HD MWF, will likely go tonight vs. Tomorrow morning   Renal diet   Continue phosphate binders      DM type 2 (diabetes mellitus, type 2)  Accuchecks ACHS with low dose ISS in addition to her scheduled insulin        VTE  Risk Mitigation (From admission, onward)         Ordered     Place sequential compression device  Until discontinued      10/23/19 1509     IP VTE HIGH RISK PATIENT  Once      10/23/19 1509                   Anat Prakash NP  Department of Hospital Medicine   [facility]

## 2019-10-23 NOTE — ASSESSMENT & PLAN NOTE
Admit to med/surg   Pain control with low dose norco and low dose IV morphine for breakthrough pain

## 2019-10-23 NOTE — ED TRIAGE NOTES
"Admit per [facility]. Fell last night. "Legs just gave out". Son assisted to bed. This morning c/o lower back pain. Due for dialysis at noon today in [place]  "

## 2019-10-23 NOTE — ED PROVIDER NOTES
"Encounter Date: 10/23/2019       History     Chief Complaint   Patient presents with    Fall     "Legs just gave out"    Back Pain     79-year-old female with a history of prior breast cancer, diabetes, GERD, hypertension, chronic kidney disease on hemodialysis, anemia, presents emergency room with complaints of having loss of balance and fallen at home.  The patient denies striking her head has no complaint of any head or neck pain. She does complain of some thoracic and lumbar back pain initially but now more lumbar pain that elsewhere.  No complaints of pain to either lower extremity but does complain of an exacerbation of her back pain with movement of her legs in particular the right leg.  No nausea vomiting. No changes in vision.  The patient is due for dialysis today.  Her nephrologist is Dr. Mendez.  No complaints of anterior chest pain or shortness of breath. She admits to some nausea without any vomiting. Patient admits being oliguric.        Review of patient's allergies indicates:   Allergen Reactions    Metformin Rash and Nausea And Vomiting     Other reaction(s): Unknown    Bactrim [sulfamethoxazole-trimethoprim] Rash    Sulfamethoxazole Rash     Other reaction(s): Rash  Other reaction(s): Rash    Trimethoprim Rash     Other reaction(s): Rash  Other reaction(s): Rash     Past Medical History:   Diagnosis Date    Anemia     sees Dr. Loyola and Dr. Turner    Anticoagulant long-term use     aspirin    Arthritis     Cancer     left breast ca    Diabetes mellitus     Encounter for blood transfusion     GERD (gastroesophageal reflux disease)     Hypertension     sees Dr. Farmer    Renal disorder     CRF on hemodialysis MWF    Urinary tract infection      Past Surgical History:   Procedure Laterality Date    AV FISTULA PLACEMENT Right     BREAST SURGERY  2011    left mastectomy [facility] dr begum     SECTION      x2    endoscopic precancerous adenomatous      [facility] [place] dr" colvin, ampulla of vater    HYSTERECTOMY      EDI    left knee patella FRACTURE      MODIFIED RADICAL MASTECTOMY W/ AXILLARY LYMPH NODE DISSECTION      right knee orthoscope       Family History   Problem Relation Age of Onset    Cancer Sister     Cancer Mother         pancreatic    Diabetes Mother     Hearing loss Mother     Cancer Father         bladder    Diabetes Father     Cancer Sister         half sister, breast     Social History     Tobacco Use    Smoking status: Never Smoker    Smokeless tobacco: Never Used   Substance Use Topics    Alcohol use: No    Drug use: No     Review of Systems   Constitutional: Negative for fever.   HENT: Negative for sore throat.    Respiratory: Negative for cough and shortness of breath.    Cardiovascular: Negative for chest pain.   Gastrointestinal: Negative for abdominal pain and nausea.   Genitourinary: Negative.  Negative for dysuria and flank pain.        Oliguria   Musculoskeletal: Positive for back pain and gait problem.   Skin: Negative for rash.   Neurological: Negative for dizziness, seizures, syncope, speech difficulty, weakness, light-headedness and headaches.   Hematological: Does not bruise/bleed easily.   All other systems reviewed and are negative.      Physical Exam     Initial Vitals [10/23/19 0954]   BP Pulse Resp Temp SpO2   (!) 163/70 71 16 98.3 °F (36.8 °C) 95 %      MAP       --         Physical Exam    Constitutional: She appears well-developed and well-nourished. She is not diaphoretic.   HENT:   Head: Normocephalic and atraumatic.   Nose: Nose normal.   Mouth/Throat: Oropharynx is clear and moist. No oropharyngeal exudate.   Eyes: Conjunctivae are normal. Pupils are equal, round, and reactive to light. Right eye exhibits no discharge. Left eye exhibits no discharge. No scleral icterus.   Neck: Normal range of motion. Neck supple. No thyromegaly present. No tracheal deviation present. No JVD present.   Cardiovascular: Normal rate, regular  rhythm, normal heart sounds and intact distal pulses. Exam reveals no gallop and no friction rub.    No murmur heard.  Pulmonary/Chest: Breath sounds normal. No stridor. No respiratory distress. She has no wheezes. She has no rhonchi. She has no rales. She exhibits no tenderness.   Abdominal: Soft. Bowel sounds are normal. She exhibits no distension. There is no tenderness. There is no rebound and no guarding.   Musculoskeletal: Normal range of motion. She exhibits no edema or tenderness.   Pain to palpation over the lumbar spine.  The pain in the lower back is exacerbated by movement of the right leg.  The right upper extremity has her AV fistula with a good thrill.   Lymphadenopathy:     She has no cervical adenopathy.   Neurological: She is alert and oriented to person, place, and time. She has normal strength. GCS score is 15. GCS eye subscore is 4. GCS verbal subscore is 5. GCS motor subscore is 6.   Skin: Skin is warm and dry. Capillary refill takes less than 2 seconds. No rash noted. No erythema. No pallor.         ED Course   Procedures  Labs Reviewed   CBC W/ AUTO DIFFERENTIAL   COMPREHENSIVE METABOLIC PANEL          Imaging Results    None                       Attending Attestation:             Attending ED Notes:   This 79-year-old female was s/p mechanical fall, has evidence of significant pain with any attempted movement or movement of the right lower extremity.  The patient had plain films obtained of the thoracic lumbar spine and pelvis which showed degenerative changes but no evidence of any acute fracture.  In light of the fact that the patient was so symptomatic CT scans were obtained and she had evidence of a nondisplaced fracture of the inferior right pubic ramus and a questionable fracture of the anterior column of the right acetabulum.  Labs showed a normal potassium of 4.7.  Blood sugars elevated at 210.  BUN and creatinine are abnormal 1 consistent with her history of chronic kidney disease.   The BUN is 46 and creatinine 6.2.  The H&H is acceptable at 11.3 and 35.5.  During the ED course hospital medicine was consulted will be admitting the patient.  She did received 2 doses of analgesics.  A consult was also placed to Dr. Mendez's group and Dr. Wolf responded will be seeing the patient in consultation.             Clinical Impression:       ICD-10-CM ICD-9-CM   1. Closed nondisplaced fracture of right acetabulum, unspecified portion of acetabulum, initial encounter S32.401A 808.0   2. Fall W19.XXXA E888.9                                Wagner Saldaña Jr., MD  10/23/19 8780

## 2019-10-23 NOTE — ASSESSMENT & PLAN NOTE
Consult Dr. Wolf for HD management  Scheduled for HD MWF, will likely go tonight vs. Tomorrow morning   Renal diet   Continue phosphate binders

## 2019-10-23 NOTE — HPI
"Ms. Camacho presents today with complaints of back pain. It is severe. It is associated with a mechanical fall last night and weakness. She denies head trauma, LOC, fever, chills, N/V/D, or dizziness. She's currently AAOx4 and states she fell last night that her legs "just gave out". She's in significant pain when she tries to move. She has a history of ESRD on HD MWF, HTN, IDDM, GERD, arthritis, and osteopenia. She lives at home with her elderly .  "

## 2019-10-23 NOTE — ASSESSMENT & PLAN NOTE
Consult ortho - suspect there will be conservative management   Consult PT to eval and treat - HH with PT vs. Inpatient PT?  SCDs for DVT prophylaxis   Progressive mobility protocol   CT and XRs completed

## 2019-10-24 PROBLEM — R53.1 WEAKNESS: Status: ACTIVE | Noted: 2019-10-24

## 2019-10-24 LAB
ANION GAP SERPL CALC-SCNC: 13 MMOL/L (ref 8–16)
BUN SERPL-MCNC: 58 MG/DL (ref 8–23)
CALCIUM SERPL-MCNC: 7.6 MG/DL (ref 8.7–10.5)
CHLORIDE SERPL-SCNC: 102 MMOL/L (ref 95–110)
CO2 SERPL-SCNC: 22 MMOL/L (ref 23–29)
CREAT SERPL-MCNC: 7.1 MG/DL (ref 0.5–1.4)
EST. GFR  (AFRICAN AMERICAN): 5.7 ML/MIN/1.73 M^2
EST. GFR  (NON AFRICAN AMERICAN): 5 ML/MIN/1.73 M^2
ESTIMATED AVG GLUCOSE: 131 MG/DL (ref 68–131)
GLUCOSE SERPL-MCNC: 201 MG/DL (ref 70–110)
GLUCOSE SERPL-MCNC: 59 MG/DL (ref 70–110)
HBA1C MFR BLD HPLC: 6.2 % (ref 4.5–6.2)
MAGNESIUM SERPL-MCNC: 2.2 MG/DL (ref 1.6–2.6)
PHOSPHATE SERPL-MCNC: 7.1 MG/DL (ref 2.7–4.5)
POTASSIUM SERPL-SCNC: 5.6 MMOL/L (ref 3.5–5.1)
SODIUM SERPL-SCNC: 137 MMOL/L (ref 136–145)

## 2019-10-24 PROCEDURE — 80048 BASIC METABOLIC PNL TOTAL CA: CPT

## 2019-10-24 PROCEDURE — 25000003 PHARM REV CODE 250: Performed by: NURSE PRACTITIONER

## 2019-10-24 PROCEDURE — 25000003 PHARM REV CODE 250: Performed by: INTERNAL MEDICINE

## 2019-10-24 PROCEDURE — 36415 COLL VENOUS BLD VENIPUNCTURE: CPT

## 2019-10-24 PROCEDURE — G0378 HOSPITAL OBSERVATION PER HR: HCPCS

## 2019-10-24 PROCEDURE — 63600175 PHARM REV CODE 636 W HCPCS: Mod: GZ | Performed by: NURSE PRACTITIONER

## 2019-10-24 PROCEDURE — 83735 ASSAY OF MAGNESIUM: CPT

## 2019-10-24 PROCEDURE — 90935 HEMODIALYSIS ONE EVALUATION: CPT

## 2019-10-24 PROCEDURE — 99202 PR OFFICE/OUTPT VISIT, NEW, LEVL II, 15-29 MIN: ICD-10-PCS | Mod: ,,, | Performed by: ORTHOPAEDIC SURGERY

## 2019-10-24 PROCEDURE — 87340 HEPATITIS B SURFACE AG IA: CPT

## 2019-10-24 PROCEDURE — 83036 HEMOGLOBIN GLYCOSYLATED A1C: CPT

## 2019-10-24 PROCEDURE — 82962 GLUCOSE BLOOD TEST: CPT | Mod: 91

## 2019-10-24 PROCEDURE — 86706 HEP B SURFACE ANTIBODY: CPT

## 2019-10-24 PROCEDURE — 99202 OFFICE O/P NEW SF 15 MIN: CPT | Mod: ,,, | Performed by: ORTHOPAEDIC SURGERY

## 2019-10-24 PROCEDURE — 96372 THER/PROPH/DIAG INJ SC/IM: CPT | Mod: 59

## 2019-10-24 PROCEDURE — 84100 ASSAY OF PHOSPHORUS: CPT

## 2019-10-24 RX ORDER — MUPIROCIN 20 MG/G
OINTMENT TOPICAL 2 TIMES DAILY
Status: DISPENSED | OUTPATIENT
Start: 2019-10-24 | End: 2019-10-29

## 2019-10-24 RX ORDER — INSULIN ASPART 100 [IU]/ML
20 INJECTION, SUSPENSION SUBCUTANEOUS DAILY
Status: DISCONTINUED | OUTPATIENT
Start: 2019-10-24 | End: 2019-10-29 | Stop reason: HOSPADM

## 2019-10-24 RX ORDER — SODIUM CHLORIDE 9 MG/ML
INJECTION, SOLUTION INTRAVENOUS ONCE
Status: DISCONTINUED | OUTPATIENT
Start: 2019-10-24 | End: 2019-10-29 | Stop reason: HOSPADM

## 2019-10-24 RX ORDER — SODIUM CHLORIDE 9 MG/ML
INJECTION, SOLUTION INTRAVENOUS
Status: DISCONTINUED | OUTPATIENT
Start: 2019-10-24 | End: 2019-10-29 | Stop reason: HOSPADM

## 2019-10-24 RX ADMIN — GABAPENTIN 100 MG: 100 CAPSULE ORAL at 09:10

## 2019-10-24 RX ADMIN — PRAVASTATIN SODIUM 40 MG: 40 TABLET ORAL at 09:10

## 2019-10-24 RX ADMIN — RENAGEL 1600 MG: 800 TABLET ORAL at 09:10

## 2019-10-24 RX ADMIN — SENNOSIDES AND DOCUSATE SODIUM 1 TABLET: 8.6; 5 TABLET ORAL at 09:10

## 2019-10-24 RX ADMIN — INSULIN ASPART 10 UNITS: 100 INJECTION, SUSPENSION SUBCUTANEOUS at 09:10

## 2019-10-24 RX ADMIN — FUROSEMIDE 20 MG: 20 TABLET ORAL at 09:10

## 2019-10-24 RX ADMIN — PANTOPRAZOLE SODIUM 40 MG: 40 TABLET, DELAYED RELEASE ORAL at 09:10

## 2019-10-24 RX ADMIN — MUPIROCIN 1 G: 20 OINTMENT TOPICAL at 09:10

## 2019-10-24 RX ADMIN — CETIRIZINE HYDROCHLORIDE 10 MG: 10 TABLET, FILM COATED ORAL at 09:10

## 2019-10-24 RX ADMIN — FAMOTIDINE 20 MG: 20 TABLET ORAL at 09:10

## 2019-10-24 RX ADMIN — LABETALOL HYDROCHLORIDE 200 MG: 200 TABLET, FILM COATED ORAL at 09:10

## 2019-10-24 NOTE — PLAN OF CARE
Problem: Adult Inpatient Plan of Care  Goal: Plan of Care Review  Outcome: Ongoing, Progressing  Goal: Patient-Specific Goal (Individualization)  Outcome: Ongoing, Progressing  Goal: Absence of Hospital-Acquired Illness or Injury  Outcome: Ongoing, Progressing  Goal: Optimal Comfort and Wellbeing  Outcome: Ongoing, Progressing  Goal: Readiness for Transition of Care  Outcome: Ongoing, Progressing  Goal: Rounds/Family Conference  Outcome: Ongoing, Progressing     Problem: Diabetes Comorbidity  Goal: Blood Glucose Level Within Desired Range  Outcome: Ongoing, Progressing     Problem: Infection  Goal: Infection Symptom Resolution  Outcome: Ongoing, Progressing     Problem: Skin Injury Risk Increased  Goal: Skin Health and Integrity  Outcome: Ongoing, Progressing     Problem: Fall Injury Risk  Goal: Absence of Fall and Fall-Related Injury  Outcome: Ongoing, Progressing     Problem: Device-Related Complication Risk (Hemodialysis)  Goal: Safe, Effective Therapy Delivery  Outcome: Ongoing, Progressing     Problem: Hemodynamic Instability (Hemodialysis)  Goal: Vital Signs Remain in Desired Range  Outcome: Ongoing, Progressing     Problem: Infection (Hemodialysis)  Goal: Absence of Infection Signs/Symptoms  Outcome: Ongoing, Progressing

## 2019-10-24 NOTE — CONSULTS
Nephrology Consult Note        Patient Name: Brandi Camacho  MRN: [id number]    Patient Class: OP- Observation   Admission Date: 10/23/2019  Length of Stay: 0 days  Date of Service: 10/24/2019    Attending Physician: Owen Hagan MD  Primary Care Provider: Greg Skinner Iv, MD    Reason for Consult: esrd/anemia/htn/shpt/fall with pelvic fracture    SUBJECTIVE:     HPI: 79F with breast cancer, DM2, GERD, HTN, ESRD on HD MWF, anemia, presents after a fall at home with pains and aches. She missed HD today. Imaging shows acute nondisplaced fracture of the inferior right pubic ramus with questionable nondisplaced fracture of the anterior column of the right acetabulum. Renal consulted for co-management.    10/24  Seen today while on dialysis.  Tolerating treatment well, no new complaints.  Missed her treatment yesterday, normally dialyzes on MWF.  Order placed for MWF HD.    Past Medical History:   Diagnosis Date    Anemia     sees Dr. Loyola and Dr. Turner    Anticoagulant long-term use     aspirin    Arthritis     Cancer     left breast ca    Diabetes mellitus     Encounter for blood transfusion     GERD (gastroesophageal reflux disease)     Hypertension     sees Dr. Farmer    Renal disorder     CRF on hemodialysis MWF    Urinary tract infection      Past Surgical History:   Procedure Laterality Date    AV FISTULA PLACEMENT Right     BREAST SURGERY  2011    left mastectomy [facility] begum     SECTION      x2    [facility]ancerous adenomatous      [facility], ampulla of vater    HYSTERECTOMY      EDI    left knee patella FRACTURE      MODIFIED RADICAL MASTECTOMY W/ AXILLARY LYMPH NODE DISSECTION      right knee orthoscope       Family History   Problem Relation Age of Onset    Cancer Sister     Cancer Mother         pancreatic    Diabetes Mother     Hearing loss Mother     Cancer Father         bladder    Diabetes Father     Cancer Sister         half sister,  breast     Social History     Tobacco Use    Smoking status: Never Smoker    Smokeless tobacco: Never Used   Substance Use Topics    Alcohol use: No    Drug use: No       Review of patient's allergies indicates:   Allergen Reactions    Metformin Rash and Nausea And Vomiting     Other reaction(s): Unknown    Bactrim [sulfamethoxazole-trimethoprim] Rash    Sulfamethoxazole Rash     Other reaction(s): Rash  Other reaction(s): Rash    Trimethoprim Rash     Other reaction(s): Rash  Other reaction(s): Rash       Outpatient meds:  No current facility-administered medications on file prior to encounter.      Current Outpatient Medications on File Prior to Encounter   Medication Sig Dispense Refill    cetirizine (ZYRTEC) 10 MG tablet Take 10 mg by mouth once daily.      furosemide (LASIX) 20 MG tablet Take 20 mg by mouth 2 (two) times daily.       gabapentin (NEURONTIN) 100 MG capsule Take 100 mg by mouth 3 (three) times daily.       insulin NPH-insulin regular (NOVOLIN 70/30) 100 unit/mL (70-30) injection Inject 30 Units into the skin once daily. AND 10 UNITS QHS      insulin NPH-insulin regular, 70/30, (NOVOLIN 70/30) 100 unit/mL (70-30) injection Inject 10 Units into the skin every evening. AND 30 UNITS QAM      labetalol (NORMODYNE) 200 MG tablet Take 200 mg by mouth 2 (two) times daily.      losartan (COZAAR) 25 MG tablet Take 25 mg by mouth once daily.       omeprazole (PRILOSEC) 40 MG capsule Take 40 mg by mouth once daily.      pravastatin (PRAVACHOL) 40 MG tablet Take 40 mg by mouth every evening.       ranitidine (ZANTAC) 150 MG tablet Take 150 mg by mouth 2 (two) times daily.       sevelamer carbonate (RENVELA) 800 mg Tab Take 1,600 mg by mouth once daily. With DINNER and 800 mg BID with other meals      sevelamer carbonate (RENVELA) 800 mg Tab Take 800 mg by mouth 2 (two) times daily. With meals, and 1600mg daily with the largest meal      vit B comp no.3/folic/C/biotin (TOM-ANA RX ORAL)  Take 1 tablet by mouth once daily.      ondansetron (ZOFRAN-ODT) 8 MG TbDL Take 8 mg by mouth every 12 (twelve) hours as needed.      promethazine (PHENERGAN) 12.5 MG Tab Take 12.5 mg by mouth every 8 (eight) hours as needed.         Scheduled meds:   sodium chloride 0.9%   Intravenous Once    cetirizine  10 mg Oral Every other day    famotidine  20 mg Oral Daily    furosemide  20 mg Oral BID    gabapentin  100 mg Oral TID    insulin aspart protamine-insulin aspart  10 Units Subcutaneous QHS    insulin aspart protamine-insulin aspart  20 Units Subcutaneous Daily    labetalol  200 mg Oral BID    mupirocin   Nasal BID    pantoprazole  40 mg Oral Daily    pravastatin  40 mg Oral QHS    senna-docusate 8.6-50 mg  1 tablet Oral BID    sevelamer HCl  1,600 mg Oral BID WM    sevelamer HCl  800 mg Oral Daily with dinner       Infusions:      PRN meds:      Review of Systems:  Review of Systems   Constitutional: Negative for chills, fever, malaise/fatigue and weight loss.   HENT: Negative for hearing loss and nosebleeds.    Eyes: Negative for blurred vision, double vision and photophobia.   Respiratory: Negative for cough, shortness of breath and wheezing.    Cardiovascular: Negative for chest pain, palpitations and leg swelling.   Gastrointestinal: Negative for abdominal pain, constipation, diarrhea, heartburn, nausea and vomiting.   Genitourinary: Negative for dysuria, frequency and urgency.   Musculoskeletal: Negative for falls, joint pain and myalgias.   Skin: Negative for itching and rash.   Neurological: Negative for dizziness, speech change, focal weakness, loss of consciousness and headaches.   Endo/Heme/Allergies: Does not bruise/bleed easily.   Psychiatric/Behavioral: Negative for depression and substance abuse. The patient is not nervous/anxious.        OBJECTIVE:     Vital Signs and IO (Last 24H):  Temp:  [97.3 °F (36.3 °C)-98.8 °F (37.1 °C)]   Pulse:  [61-66]   Resp:  [16-21]   BP:  ()/(47-72)   SpO2:  [88 %-100 %]   I/O last 3 completed shifts:  In: -   Out: 250 [Urine:250]    Wt Readings from Last 5 Encounters:   10/23/19 81.6 kg (179 lb 14.3 oz)   09/13/18 79.8 kg (176 lb)   03/15/18 80.3 kg (177 lb)   04/20/16 84.8 kg (187 lb)   11/10/15 81.6 kg (180 lb)           Physical Exam:  Physical Exam   Constitutional: She is oriented to person, place, and time. She appears well-developed and well-nourished.   HENT:   Head: Normocephalic and atraumatic.   Mouth/Throat: Oropharynx is clear and moist.   Eyes: Pupils are equal, round, and reactive to light. EOM are normal. No scleral icterus.   Neck: Neck supple.   Cardiovascular: Normal rate and regular rhythm.   Pulmonary/Chest: Effort normal. No stridor. No respiratory distress.   Abdominal: Soft. She exhibits no distension.   Musculoskeletal: Normal range of motion. She exhibits no edema or deformity.   Neurological: She is alert and oriented to person, place, and time. No cranial nerve deficit.   Skin: Skin is warm and dry. No rash noted. She is not diaphoretic. No erythema.   Psychiatric: She has a normal mood and affect. Her behavior is normal.       Body mass index is 29.04 kg/m².    Laboratory:  Recent Labs   Lab 10/23/19  1040 10/24/19  0516    137   K 4.7 5.6*    102   CO2 23 22*   BUN 46* 58*   CREATININE 6.2* 7.1*   ESTGFRAFRICA 6.7* 5.7*   EGFRNONAA 5.8* 5.0*   * 59*       Recent Labs   Lab 10/23/19  1040 10/24/19  0516   CALCIUM 7.4* 7.6*   ALBUMIN 2.9*  --    PHOS  --  7.1*   MG  --  2.2             No results for input(s): POCTGLUCOSE in the last 168 hours.    Recent Labs   Lab 10/24/19  0515   Hemoglobin A1C 6.2       Recent Labs   Lab 10/23/19  1040   WBC 4.95   HGB 11.3*   HCT 35.5*   PLT 84*   *   MCHC 31.8*   MONO 9.1  0.5       Recent Labs   Lab 10/23/19  1040   BILITOT 1.2*   PROT 6.2   ALBUMIN 2.9*   ALKPHOS 122   ALT 18   AST 23             Invalid input(s): LEUCOCYTESUR          Microbiology  Results (last 7 days)     ** No results found for the last 168 hours. **            ASSESSMENT/PLAN:     ESRD on HD MWF via AVF  Continue current dialysis prescription.  Next HD on today or tomorrow.  Renal diet - low K, low phos.  No IVs or BP checks on access and/or non-dominant arm.    Anemia of CKD  Hgb and HCT are acceptable. Monitor.  Will provide SARAH BETH and/or IV iron PRN.    MBD / Secondary HPT  Ca, phos, PTH and vitamin D levels are acceptable.   Phos binders, vitamin D analogues and calcimimetics as needed.    HTN  BP seem controlled.   Tolerate asymptomatic HTN up to -160.  Continue home meds.  Low sodium diet.    Fall with pelvic fracture.  Plan per Surg and primary team.    Thank you for allowing us to participate in the care of your patient!   We will follow the patient and provide recommendations as needed.    Valerie Chester NP    [facility]  [address] [place] [address]  [place] [address]    [phone] - tel  [phone] - fax    10/24/2019 2:46 PM

## 2019-10-24 NOTE — SUBJECTIVE & OBJECTIVE
Past Medical History:   Diagnosis Date    Anemia     sees Dr. Loyola and Dr. Turner    Anticoagulant long-term use     aspirin    Arthritis     Cancer     left breast ca    Diabetes mellitus     Encounter for blood transfusion     GERD (gastroesophageal reflux disease)     Hypertension     sees Dr. Farmer    Renal disorder     CRF on hemodialysis MWF    Urinary tract infection        Past Surgical History:   Procedure Laterality Date    AV FISTULA PLACEMENT Right     BREAST SURGERY  2011    left mastectomy [facility] dr begum     SECTION      x2    endoscopic precancerous adenomatous      main [place] dr colvin, ampulla of vater    HYSTERECTOMY      EDI    left knee patella FRACTURE      MODIFIED RADICAL MASTECTOMY W/ AXILLARY LYMPH NODE DISSECTION      right knee orthoscope         Review of patient's allergies indicates:   Allergen Reactions    Metformin Rash and Nausea And Vomiting     Other reaction(s): Unknown    Bactrim [sulfamethoxazole-trimethoprim] Rash    Sulfamethoxazole Rash     Other reaction(s): Rash  Other reaction(s): Rash    Trimethoprim Rash     Other reaction(s): Rash  Other reaction(s): Rash       Current Facility-Administered Medications   Medication    0.9%  NaCl infusion    0.9%  NaCl infusion    acetaminophen tablet 650 mg    cetirizine tablet 10 mg    dextrose 50% injection 12.5 g    dextrose 50% injection 25 g    famotidine tablet 20 mg    furosemide tablet 20 mg    gabapentin capsule 100 mg    glucagon (human recombinant) injection 1 mg    glucose chewable tablet 16 g    glucose chewable tablet 24 g    HYDROcodone-acetaminophen 5-325 mg per tablet 1 tablet    insulin aspart protamine-insulin aspart (NovoLOG 70/30) injection    insulin aspart protamine-insulin aspart (NovoLOG 70/30) injection    insulin aspart U-100 pen 0-5 Units    labetalol tablet 200 mg    morphine injection 2 mg    mupirocin 2 % ointment    ondansetron injection 4 mg     pantoprazole EC tablet 40 mg    pravastatin tablet 40 mg    senna-docusate 8.6-50 mg per tablet 1 tablet    sevelamer HCl tablet 1,600 mg    sevelamer HCl tablet 800 mg    sodium chloride 0.9% flush 10 mL     Family History     Problem Relation (Age of Onset)    Cancer Sister, Mother, Father, Sister    Diabetes Mother, Father    Hearing loss Mother        Tobacco Use    Smoking status: Never Smoker    Smokeless tobacco: Never Used   Substance and Sexual Activity    Alcohol use: No    Drug use: No    Sexual activity: Not on file     Review of Systems   Constitution: Negative for chills, decreased appetite, diaphoresis and fever.   HENT: Negative for congestion, ear discharge and hearing loss.    Eyes: Negative for blurred vision, double vision and pain.   Cardiovascular: Negative for chest pain and dyspnea on exertion.   Respiratory: Negative for cough and hemoptysis.    Endocrine: Negative for polydipsia and polyphagia.   Skin: Negative for color change, flushing and itching.   Musculoskeletal: Positive for arthritis, back pain, falls, joint pain and muscle weakness.   Gastrointestinal: Negative for bloating, abdominal pain, bowel incontinence, constipation and diarrhea.   Genitourinary: Negative for bladder incontinence, dysuria, flank pain and frequency.   Neurological: Positive for disturbances in coordination and weakness. Negative for aphonia, brief paralysis, difficulty with concentration and headaches.   Psychiatric/Behavioral: Negative for altered mental status, depression and hallucinations.     Objective:     Vital Signs (Most Recent):  Temp: 98 °F (36.7 °C) (10/24/19 1135)  Pulse: 65 (10/24/19 1135)  Resp: 18 (10/24/19 1135)  BP: (!) 101/55 (10/24/19 1135)  SpO2: 98 % (10/24/19 1135) Vital Signs (24h Range):  Temp:  [97.3 °F (36.3 °C)-98.8 °F (37.1 °C)] 98 °F (36.7 °C)  Pulse:  [61-66] 65  Resp:  [16-21] 18  SpO2:  [88 %-100 %] 98 %  BP: ()/(47-72) 101/55     Weight: 81.6 kg (179 lb  "14.3 oz)  Height: 5' 6" (167.6 cm)  Body mass index is 29.04 kg/m².      Intake/Output Summary (Last 24 hours) at 10/24/2019 1301  Last data filed at 10/23/2019 1657  Gross per 24 hour   Intake --   Output 250 ml   Net -250 ml       General    Constitutional: She is oriented to person, place, and time. She appears well-developed and well-nourished.   HENT:   Head: Normocephalic and atraumatic.   Eyes: EOM are normal. Pupils are equal, round, and reactive to light.   Neck: Neck supple.   Cardiovascular: Normal rate, regular rhythm and normal heart sounds.    Pulmonary/Chest: Effort normal and breath sounds normal.   Abdominal: Soft. Bowel sounds are normal.   Neurological: She is alert and oriented to person, place, and time.   Psychiatric: She has a normal mood and affect. Her behavior is normal. Thought content normal.             Right Hip Exam     Comments:  Pelvis is stable to rock and compression  Tenderness to palpation noted on the right groin area  Distally motor is intact EHL, FHL, TA, gastroc  Plus two dorsalis pedis and posterior tibial pulses  Normal station light touch dorsal, plantar, 1st webspace        Significant Labs:   CBC:   Recent Labs   Lab 10/23/19  1040   WBC 4.95   HGB 11.3*   HCT 35.5*   PLT 84*     All pertinent labs within the past 24 hours have been reviewed.    Significant Imaging: CT: I have reviewed all pertinent results/findings and my personal findings are:  There are centrally nondisplaced fractures of the inferior ramus on the right and at the root of the superior ramus on the right as an internist anterior column.  "

## 2019-10-24 NOTE — PLAN OF CARE
Buenrostro form scanned into patient record       10/24/19 1101   BUENROSTRO Message   Medicare Outpatient and Observation Notification regarding financial responsibility Given to patient/caregiver;Explained to patient/caregiver;Signed/date by patient/caregiver   Date BUENROSTRO was signed 10/24/19   Time BUENROSTRO was signed 1007

## 2019-10-24 NOTE — SUBJECTIVE & OBJECTIVE
Interval History:  Patient reports that she feels okay.  Continues to feel weak in the lower extremities.  Has some pain in her lower back that is well controlled with pain medications.  Ortho was the patient today.  Will need clearance for physical therapy if patient is not a surgical candidate.  Patient with low blood pressures.  Discontinue home BP meds.  Blood sugars also low, 59, yesterday.  Decrease home insulin regimen.  HbA1c ordered.  PT/OT will evaluate patient once cleared by Orthopedic surgery.   is at bedside.  All questions were answered.    Review of Systems   Constitutional: Negative for chills, fatigue, fever and unexpected weight change.   HENT: Negative for sore throat.    Eyes: Negative for visual disturbance.   Respiratory: Negative for cough, chest tightness and shortness of breath.    Cardiovascular: Negative for chest pain.   Gastrointestinal: Negative for abdominal pain, constipation, diarrhea, nausea and vomiting.   Endocrine: Negative for polyuria.   Genitourinary: Negative for dysuria and hematuria.   Neurological: Negative for headaches.     Objective:     Vital Signs (Most Recent):  Temp: 97.6 °F (36.4 °C) (10/24/19 0743)  Pulse: 62 (10/24/19 0743)  Resp: 16 (10/24/19 0743)  BP: (!) 105/53 (10/24/19 0743)  SpO2: 98 % (10/24/19 0743) Vital Signs (24h Range):  Temp:  [97.3 °F (36.3 °C)-98.8 °F (37.1 °C)] 97.6 °F (36.4 °C)  Pulse:  [61-66] 62  Resp:  [16-21] 16  SpO2:  [88 %-100 %] 98 %  BP: ()/(47-72) 105/53     Weight: 81.6 kg (179 lb 14.3 oz)  Body mass index is 29.04 kg/m².    Intake/Output Summary (Last 24 hours) at 10/24/2019 1134  Last data filed at 10/23/2019 1657  Gross per 24 hour   Intake --   Output 250 ml   Net -250 ml      Physical Exam   Constitutional: She is oriented to person, place, and time. She appears well-developed and well-nourished. No distress.   Frail appearing   HENT:   Right Ear: External ear normal.   Left Ear: External ear normal.   Eyes:  Conjunctivae and EOM are normal. Right eye exhibits no discharge. Left eye exhibits no discharge.   Neck: Normal range of motion.   Cardiovascular: Normal rate and regular rhythm. Exam reveals no gallop and no friction rub.   Murmur heard.  Pulmonary/Chest: Effort normal and breath sounds normal. No respiratory distress. She has no wheezes. She has no rales. She exhibits no tenderness.   Musculoskeletal: She exhibits no edema or tenderness.   Neurological: She is alert and oriented to person, place, and time.   Skin: Skin is warm. She is not diaphoretic.   Psychiatric: She has a normal mood and affect. Her behavior is normal. Judgment and thought content normal.   Nursing note and vitals reviewed.      Significant Labs:   CBC:   Recent Labs   Lab 10/23/19  1040   WBC 4.95   HGB 11.3*   HCT 35.5*   PLT 84*     CMP:   Recent Labs   Lab 10/23/19  1040 10/24/19  0516    137   K 4.7 5.6*    102   CO2 23 22*   * 59*   BUN 46* 58*   CREATININE 6.2* 7.1*   CALCIUM 7.4* 7.6*   PROT 6.2  --    ALBUMIN 2.9*  --    BILITOT 1.2*  --    ALKPHOS 122  --    AST 23  --    ALT 18  --    ANIONGAP 13 13   EGFRNONAA 5.8* 5.0*       Significant Imaging: I have reviewed all pertinent imaging results/findings within the past 24 hours.

## 2019-10-24 NOTE — HPI
"79-year-old female presented yesterday complaining of back and pelvic pain after a mechanical fall the night before.  She denied any loss of consciousness or head trauma.  She states that her legs just gave out."  She reports pain in the back and pelvis with any movement.  "

## 2019-10-24 NOTE — ASSESSMENT & PLAN NOTE
Consult Dr. Wolf for HD management  Scheduled for HD MWF  Renal diet   Continue phosphate binders

## 2019-10-24 NOTE — PT/OT/SLP PROGRESS
Physical Therapy      Patient Name:  Brandi Camacho   MRN:  [id number]    Patient not seen today secondary to Other (Comment)((awaiting ortho clearance and WB status)). PT orders rec and chart reviewed. Pt s/p fall with findings of non-displaced fracture of inferior R pubic ramus with questionable non-displaced fracture of anterior column of R acetabulum. Dr. Hagan aware. Will follow-up 10/25/19.    Marsha Garcia, PT

## 2019-10-24 NOTE — ASSESSMENT & PLAN NOTE
Pain control with low dose norco and low dose IV morphine for breakthrough pain, well controlled  PT/OT will evaluate patient once Ortho clears  Discontinue blood pressure medications that can contribute to patient's weakness  Decreased insulin regimen due to hypoglycemia that contribute to patient's weakness.  HbA1c ordered

## 2019-10-24 NOTE — PROGRESS NOTES
[facility] Medicine  Progress Note    Patient Name: Brandi Camacho  MRN: [id number]  Patient Class: OP- Observation   Admission Date: 10/23/2019  Length of Stay: 0 days  Attending Physician: Owen Hagan MD  Primary Care Provider: Greg Skinner Iv, MD        Subjective:     Principal Problem:Intractable pain    Interval History:  Patient reports that she feels okay.  Continues to feel weak in the lower extremities.  Has some pain in her lower back that is well controlled with pain medications.  Ortho was the patient today.  Will need clearance for physical therapy if patient is not a surgical candidate.  Patient with low blood pressures.  Discontinue home BP meds.  Blood sugars also low, 59, yesterday.  Decrease home insulin regimen.  HbA1c ordered.  PT/OT will evaluate patient once cleared by Orthopedic surgery.   is at bedside.  All questions were answered.    Review of Systems   Constitutional: Negative for chills, fatigue, fever and unexpected weight change.   HENT: Negative for sore throat.    Eyes: Negative for visual disturbance.   Respiratory: Negative for cough, chest tightness and shortness of breath.    Cardiovascular: Negative for chest pain.   Gastrointestinal: Negative for abdominal pain, constipation, diarrhea, nausea and vomiting.   Endocrine: Negative for polyuria.   Genitourinary: Negative for dysuria and hematuria.   Neurological: Negative for headaches.     Objective:     Vital Signs (Most Recent):  Temp: 97.6 °F (36.4 °C) (10/24/19 0743)  Pulse: 62 (10/24/19 0743)  Resp: 16 (10/24/19 0743)  BP: (!) 105/53 (10/24/19 0743)  SpO2: 98 % (10/24/19 0743) Vital Signs (24h Range):  Temp:  [97.3 °F (36.3 °C)-98.8 °F (37.1 °C)] 97.6 °F (36.4 °C)  Pulse:  [61-66] 62  Resp:  [16-21] 16  SpO2:  [88 %-100 %] 98 %  BP: ()/(47-72) 105/53     Weight: 81.6 kg (179 lb 14.3 oz)  Body mass index is 29.04 kg/m².    Intake/Output Summary (Last 24 hours) at 10/24/2019  1134  Last data filed at 10/23/2019 1657  Gross per 24 hour   Intake --   Output 250 ml   Net -250 ml      Physical Exam   Constitutional: She is oriented to person, place, and time. She appears well-developed and well-nourished. No distress.   Frail appearing   HENT:   Right Ear: External ear normal.   Left Ear: External ear normal.   Eyes: Conjunctivae and EOM are normal. Right eye exhibits no discharge. Left eye exhibits no discharge.   Neck: Normal range of motion.   Cardiovascular: Normal rate and regular rhythm. Exam reveals no gallop and no friction rub.   Murmur heard.  Pulmonary/Chest: Effort normal and breath sounds normal. No respiratory distress. She has no wheezes. She has no rales. She exhibits no tenderness.   Musculoskeletal: She exhibits no edema or tenderness.   Neurological: She is alert and oriented to person, place, and time.   Skin: Skin is warm. She is not diaphoretic.   Psychiatric: She has a normal mood and affect. Her behavior is normal. Judgment and thought content normal.   Nursing note and vitals reviewed.      Significant Labs:   CBC:   Recent Labs   Lab 10/23/19  1040   WBC 4.95   HGB 11.3*   HCT 35.5*   PLT 84*     CMP:   Recent Labs   Lab 10/23/19  1040 10/24/19  0516    137   K 4.7 5.6*    102   CO2 23 22*   * 59*   BUN 46* 58*   CREATININE 6.2* 7.1*   CALCIUM 7.4* 7.6*   PROT 6.2  --    ALBUMIN 2.9*  --    BILITOT 1.2*  --    ALKPHOS 122  --    AST 23  --    ALT 18  --    ANIONGAP 13 13   EGFRNONAA 5.8* 5.0*       Significant Imaging: I have reviewed all pertinent imaging results/findings within the past 24 hours.      Assessment/Plan:     Active Hospital Problems    Diagnosis    *Intractable pain    Weakness    ESRD (end stage renal disease)    Closed fracture of right inferior pubic ramus    DM type 2 (diabetes mellitus, type 2)        * Intractable pain  Pain control with low dose norco and low dose IV morphine for breakthrough pain, well  controlled  PT/OT will evaluate patient once Ortho clears  Discontinue blood pressure medications that can contribute to patient's weakness  Decreased insulin regimen due to hypoglycemia that contribute to patient's weakness.  HbA1c ordered        Closed fracture of right inferior pubic ramus  Consult ortho - suspect there will be conservative management   Consult PT to eval and treat - HH with PT vs. Inpatient PT?  SCDs for DVT prophylaxis   Progressive mobility protocol   CT and XRs completed      ESRD (end stage renal disease)  Consult Dr. Wolf for HD management  Scheduled for HD MWF  Renal diet   Continue phosphate binders      DM type 2 (diabetes mellitus, type 2)  Accuchecks ACHS with low dose ISS in addition to her scheduled insulin      VTE Risk Mitigation (From admission, onward)         Ordered     Place sequential compression device  Until discontinued      10/23/19 1509     IP VTE HIGH RISK PATIENT  Once      10/23/19 1509                      Owen Hagan MD  Department of Hospital Medicine   [facility]  Date of service: 10/24/2019 11:40 AM

## 2019-10-24 NOTE — CONSULTS
"[facility]  Orthopedics  Consult Note    Patient Name: Brandi Camacho  MRN: [id number]  Admission Date: 10/23/2019  Hospital Length of Stay: 0 days  Attending Provider: Owen Hagan MD  Primary Care Provider: Greg Skinner Iv, MD    Patient information was obtained from patient and ER records.     Consults  Subjective:     Principal Problem:Intractable pain    Chief Complaint:   Chief Complaint   Patient presents with    Fall     "Legs just gave out"    Back Pain        HPI: 79-year-old female presented yesterday complaining of back and pelvic pain after a mechanical fall the night before.  She denied any loss of consciousness or head trauma.  She states that her legs just gave out."  She reports pain in the back and pelvis with any movement.    CT scan was reviewed and the patient has nondisplaced fractures at the root of the superior ramus and of the inferior ramus on the right  .  Assessment/Plan:     79-year-old female with pelvic rami fractures, nondisplaced and stable. No surgical indications at this time.  Recommend protected weight-bearing on the right lower extremity.  She can follow up in the office after discharge for re-evaluation.    Thank you for your consult. I will follow-up with patient. Please contact us if you have any additional questions.    Conner Campos II, MD  Orthopedics  [facility]      "

## 2019-10-24 NOTE — HOSPITAL COURSE
The patient has been admitted by the hospitalist service.  Multiple CT scans of the spine and pelvis were obtained. On the CT of the pelvis there was noted be a nondisplaced fracture of the inferior pubic ramus and a possible nondisplaced fracture of the anterior column of the acetabulum.  Orthopedics has been consulted for evaluation and management of the pelvic fracture noted on CT scan.

## 2019-10-25 ENCOUNTER — TELEPHONE (OUTPATIENT)
Dept: ORTHOPEDICS | Facility: CLINIC | Age: 79
End: 2019-10-25

## 2019-10-25 LAB
ANION GAP SERPL CALC-SCNC: 12 MMOL/L (ref 8–16)
BUN SERPL-MCNC: 46 MG/DL (ref 8–23)
CALCIUM SERPL-MCNC: 7.5 MG/DL (ref 8.7–10.5)
CHLORIDE SERPL-SCNC: 96 MMOL/L (ref 95–110)
CO2 SERPL-SCNC: 20 MMOL/L (ref 23–29)
CREAT SERPL-MCNC: 6.1 MG/DL (ref 0.5–1.4)
ERYTHROCYTE [DISTWIDTH] IN BLOOD BY AUTOMATED COUNT: 16.2 % (ref 11.5–14.5)
EST. GFR  (AFRICAN AMERICAN): 6.9 ML/MIN/1.73 M^2
EST. GFR  (NON AFRICAN AMERICAN): 5.9 ML/MIN/1.73 M^2
GLUCOSE SERPL-MCNC: 164 MG/DL (ref 70–110)
GLUCOSE SERPL-MCNC: 225 MG/DL (ref 70–110)
GLUCOSE SERPL-MCNC: 233 MG/DL (ref 70–110)
GLUCOSE SERPL-MCNC: 235 MG/DL (ref 70–110)
GLUCOSE SERPL-MCNC: 258 MG/DL (ref 70–110)
HBV SURFACE AB SER QL: REACTIVE
HBV SURFACE AG SERPL QL IA: NEGATIVE
HCT VFR BLD AUTO: 38 % (ref 37–48.5)
HGB BLD-MCNC: 11.9 G/DL (ref 12–16)
MAGNESIUM SERPL-MCNC: 2.1 MG/DL (ref 1.6–2.6)
MCH RBC QN AUTO: 32.9 PG (ref 27–31)
MCHC RBC AUTO-ENTMCNC: 31.3 G/DL (ref 32–36)
MCV RBC AUTO: 105 FL (ref 82–98)
PHOSPHATE SERPL-MCNC: 6.1 MG/DL (ref 2.7–4.5)
PLATELET # BLD AUTO: 87 K/UL (ref 150–350)
PMV BLD AUTO: 10.6 FL (ref 9.2–12.9)
POTASSIUM SERPL-SCNC: 5.1 MMOL/L (ref 3.5–5.1)
RBC # BLD AUTO: 3.62 M/UL (ref 4–5.4)
SODIUM SERPL-SCNC: 128 MMOL/L (ref 136–145)
WBC # BLD AUTO: 5.43 K/UL (ref 3.9–12.7)

## 2019-10-25 PROCEDURE — 36415 COLL VENOUS BLD VENIPUNCTURE: CPT

## 2019-10-25 PROCEDURE — 80048 BASIC METABOLIC PNL TOTAL CA: CPT

## 2019-10-25 PROCEDURE — 90935 HEMODIALYSIS ONE EVALUATION: CPT

## 2019-10-25 PROCEDURE — 96372 THER/PROPH/DIAG INJ SC/IM: CPT | Mod: 59

## 2019-10-25 PROCEDURE — 84100 ASSAY OF PHOSPHORUS: CPT

## 2019-10-25 PROCEDURE — 63600175 PHARM REV CODE 636 W HCPCS: Mod: GZ | Performed by: FAMILY MEDICINE

## 2019-10-25 PROCEDURE — G0378 HOSPITAL OBSERVATION PER HR: HCPCS

## 2019-10-25 PROCEDURE — 83735 ASSAY OF MAGNESIUM: CPT

## 2019-10-25 PROCEDURE — 25000003 PHARM REV CODE 250: Mod: GZ | Performed by: NURSE PRACTITIONER

## 2019-10-25 PROCEDURE — 85027 COMPLETE CBC AUTOMATED: CPT

## 2019-10-25 RX ORDER — INSULIN ASPART 100 [IU]/ML
12 INJECTION, SUSPENSION SUBCUTANEOUS NIGHTLY
Status: DISCONTINUED | OUTPATIENT
Start: 2019-10-25 | End: 2019-10-29 | Stop reason: HOSPADM

## 2019-10-25 RX ADMIN — PRAVASTATIN SODIUM 40 MG: 40 TABLET ORAL at 08:10

## 2019-10-25 RX ADMIN — HYDROCODONE BITARTRATE AND ACETAMINOPHEN 1 TABLET: 5; 325 TABLET ORAL at 08:10

## 2019-10-25 RX ADMIN — MUPIROCIN: 20 OINTMENT TOPICAL at 09:10

## 2019-10-25 RX ADMIN — INSULIN ASPART 12 UNITS: 100 INJECTION, SUSPENSION SUBCUTANEOUS at 06:10

## 2019-10-25 RX ADMIN — GABAPENTIN 100 MG: 100 CAPSULE ORAL at 08:10

## 2019-10-25 RX ADMIN — RENAGEL 800 MG: 800 TABLET ORAL at 06:10

## 2019-10-25 RX ADMIN — SENNOSIDES AND DOCUSATE SODIUM 1 TABLET: 8.6; 5 TABLET ORAL at 08:10

## 2019-10-25 RX ADMIN — LABETALOL HYDROCHLORIDE 200 MG: 200 TABLET, FILM COATED ORAL at 08:10

## 2019-10-25 RX ADMIN — FUROSEMIDE 20 MG: 20 TABLET ORAL at 06:10

## 2019-10-25 NOTE — PROGRESS NOTES
[facility] Medicine  Progress Note    Patient Name: Brandi Camacho  MRN: [id number]  Patient Class: OP- Observation   Admission Date: 10/23/2019  Length of Stay: 0 days  Attending Physician: Owen Hagan MD  Primary Care Provider: Greg Skinner Iv, MD        Subjective:     Principal Problem:Intractable pain    Interval History:  Currently receiving dialysis.  Patient reports feeling well.  Feels a little tired and weak but otherwise feels okay.  Discussed with nursing regarding getting in contact with Orthopedics for clearance to have physical therapy work with the patient.  HbA1c 6.2.  Will liberate insulin requirements.  Blood pressure continues to be normal/low off of blood pressure medications.    Review of Systems   Constitutional: Negative for chills, fatigue, fever and unexpected weight change.   HENT: Negative for sore throat.    Eyes: Negative for visual disturbance.   Respiratory: Negative for cough, chest tightness and shortness of breath.    Cardiovascular: Negative for chest pain.   Gastrointestinal: Negative for abdominal pain, constipation, diarrhea, nausea and vomiting.   Endocrine: Negative for polyuria.   Genitourinary: Negative for dysuria and hematuria.   Neurological: Negative for headaches.     Objective:     Vital Signs (Most Recent):  Temp: 98.2 °F (36.8 °C) (10/25/19 0800)  Pulse: 63 (10/25/19 0800)  Resp: 18 (10/25/19 0800)  BP: 102/78 (10/25/19 0800)  SpO2: (!) 93 % (10/25/19 0649) Vital Signs (24h Range):  Temp:  [96.2 °F (35.7 °C)-98.8 °F (37.1 °C)] 98.2 °F (36.8 °C)  Pulse:  [63-72] 63  Resp:  [18-20] 18  SpO2:  [93 %-99 %] 93 %  BP: (101-164)/(34-78) 102/78     Weight: 81.6 kg (179 lb 14.3 oz)  Body mass index is 29.04 kg/m².    Intake/Output Summary (Last 24 hours) at 10/25/2019 1000  Last data filed at 10/24/2019 1610  Gross per 24 hour   Intake 500 ml   Output 3500 ml   Net -3000 ml      Physical Exam  Constitutional: She is oriented to person, place,  and time. She appears well-developed and well-nourished. No distress.   Frail appearing   HENT:   Right Ear: External ear normal.   Left Ear: External ear normal.   Eyes: Conjunctivae and EOM are normal. Right eye exhibits no discharge. Left eye exhibits no discharge.   Neck: Normal range of motion.   Cardiovascular: Normal rate and regular rhythm. Exam reveals no gallop and no friction rub.   Murmur heard.  Pulmonary/Chest: Effort normal and breath sounds normal. No respiratory distress. She has no wheezes. She has no rales. She exhibits no tenderness.   Musculoskeletal: She exhibits no edema or tenderness.   Neurological: She is alert and oriented to person, place, and time.   Skin: Skin is warm. She is not diaphoretic.   Psychiatric: She has a normal mood and affect. Her behavior is normal. Judgment and thought content normal.   Nursing note and vitals reviewed.    Significant Labs:   CBC:   Recent Labs   Lab 10/23/19  1040 10/25/19  0512   WBC 4.95 5.43   HGB 11.3* 11.9*   HCT 35.5* 38.0   PLT 84* 87*     CMP:   Recent Labs   Lab 10/23/19  1040 10/24/19  0516 10/25/19  0512    137 128*   K 4.7 5.6* 5.1    102 96   CO2 23 22* 20*   * 59* 233*   BUN 46* 58* 46*   CREATININE 6.2* 7.1* 6.1*   CALCIUM 7.4* 7.6* 7.5*   PROT 6.2  --   --    ALBUMIN 2.9*  --   --    BILITOT 1.2*  --   --    ALKPHOS 122  --   --    AST 23  --   --    ALT 18  --   --    ANIONGAP 13 13 12   EGFRNONAA 5.8* 5.0* 5.9*       Assessment/Plan:     Active Hospital Problems    Diagnosis    *Intractable pain    Weakness    ESRD (end stage renal disease)    Closed fracture of right inferior pubic ramus    DM type 2 (diabetes mellitus, type 2)        * Intractable pain  Pain control low dose norco, well controlled  PT/OT will evaluate patient once Ortho clears  Discontinue blood pressure medications that can contribute to patient's weakness  Decreased insulin regimen due to hypoglycemia that contribute to patient's weakness.     HbA1c 6.2. With patient's age, would have goal HbA1c 8 to reduce risk of hypoglycemia.        Closed fracture of right inferior pubic ramus  Consult ortho - suspect there will be conservative management   Consult PT to eval and treat - HH with PT vs. Inpatient PT?  SCDs for DVT prophylaxis   Progressive mobility protocol   CT and XRs completed      ESRD (end stage renal disease)  Consult Dr. Wolf for HD management  Scheduled for HD MWF  Renal diet   Continue phosphate binders      DM type 2 (diabetes mellitus, type 2)  Accuchecks ACHS with low dose ISS in addition to her scheduled insulin      VTE Risk Mitigation (From admission, onward)         Ordered     Place sequential compression device  Until discontinued      10/23/19 1509     IP VTE HIGH RISK PATIENT  Once      10/23/19 1509                      Owen Hagan MD  Department of Hospital Medicine   [facility]  Date of service: 10/25/2019 10:08 AM

## 2019-10-25 NOTE — TELEPHONE ENCOUNTER
----- Message from Sonal Page MA sent at 10/25/2019  9:03 AM CDT -----  Contact: jessy nugent mem    note in chart for  Dr Hagan, pt ambulate to PT   Call back

## 2019-10-25 NOTE — HOSPITAL COURSE
Patient was found to have an acute nondisplaced fracture of the inferior right pubic ramus with questionable nondisplaced fracture of the anterior column of the right acetabulum on CT imaging.  Yesterday was awaiting orthopedics for clearance to work with physical therapy - Dr. Campos recommended protected weight-bearing on the right lower extremity. Discontinued antihypertensive medications due to low/normal blood pressures and decreased home insulin with HbA1c 6.2.  Physical therapy evaluated patient and recommended SNF placement however patient does not qualify.  Patient was stable at discharged to home with home health and physical therapy.    General: Patient resting comfortably in no acute distress. Appears as stated age. Calm. Frail appearing  Eyes: EOM intact. No conjunctivae injection. No scleral icterus.  ENT: Hearing grossly intact. No discharge from ears. No nasal discharge.   CVS: RRR. No LE edema BL.  Lungs: CTA BL, no wheezing or crackles. Good breath sounds. No accessory muscle use. No acute respiratory distress  Neuro: AOx3. GCS 15. Cranial nerves grossly intact. Moves all extremities equally. Follows commands. Responds appropriately

## 2019-10-25 NOTE — PROGRESS NOTES
HD: tx complete, pt stable. Lines re-infused, needles removed. Hemostasis achieved. Pt tolerated tx well. Positive thrill and bruit post tx.     Net uf 3000 ml   Sintia Jacques RN  10/24/2019 7:09 PM

## 2019-10-25 NOTE — CONSULTS
Nephrology Consult Note        Patient Name: Brandi Camacho  MRN: [id number]    Patient Class: OP- Observation   Admission Date: 10/23/2019  Length of Stay: 0 days  Date of Service: 10/25/2019    Attending Physician: Owen Hagan MD  Primary Care Provider: Greg Skinner Iv, MD    Reason for Consult: esrd/anemia/htn/shpt/fall with pelvic fracture    SUBJECTIVE:     HPI: 79F with breast cancer, DM2, GERD, HTN, ESRD on HD MWF, anemia, presents after a fall at home with pains and aches. She missed HD today. Imaging shows acute nondisplaced fracture of the inferior right pubic ramus with questionable nondisplaced fracture of the anterior column of the right acetabulum. Renal consulted for co-management.    10/24  Seen today while on dialysis.  Tolerating treatment well, no new complaints.  Missed her treatment yesterday, normally dialyzes on MWF.  Order placed for MWF HD.  10/25  Seen today on dialysis.  NAD noted, no new complaint of any kind.  VSS, K+ 5.1 this am.    Past Medical History:   Diagnosis Date    Anemia     sees Dr. Loyola and Dr. Turner    Anticoagulant long-term use     aspirin    Arthritis     Cancer     left breast ca    Diabetes mellitus     Encounter for blood transfusion     GERD (gastroesophageal reflux disease)     Hypertension     sees Dr. Farmer    Renal disorder     CRF on hemodialysis MWF    Urinary tract infection      Past Surgical History:   Procedure Laterality Date    AV FISTULA PLACEMENT Right     BREAST SURGERY  2011    left mastectomy [facility] dr begum     SECTION      x2    [facility]omat[facility], ampulla of vater    HYSTERECTOMY      EDI    left knee patella FRACTURE      MODIFIED RADICAL MASTECTOMY W/ AXILLARY LYMPH NODE DISSECTION      right knee orthoscope       Family History   Problem Relation Age of Onset    Cancer Sister     Cancer Mother         pancreatic    Diabetes Mother     Hearing loss  Mother     Cancer Father         bladder    Diabetes Father     Cancer Sister         half sister, breast     Social History     Tobacco Use    Smoking status: Never Smoker    Smokeless tobacco: Never Used   Substance Use Topics    Alcohol use: No    Drug use: No       Review of patient's allergies indicates:   Allergen Reactions    Metformin Rash and Nausea And Vomiting     Other reaction(s): Unknown    Bactrim [sulfamethoxazole-trimethoprim] Rash    Sulfamethoxazole Rash     Other reaction(s): Rash  Other reaction(s): Rash    Trimethoprim Rash     Other reaction(s): Rash  Other reaction(s): Rash       Outpatient meds:  No current facility-administered medications on file prior to encounter.      Current Outpatient Medications on File Prior to Encounter   Medication Sig Dispense Refill    cetirizine (ZYRTEC) 10 MG tablet Take 10 mg by mouth once daily.      furosemide (LASIX) 20 MG tablet Take 20 mg by mouth 2 (two) times daily.       gabapentin (NEURONTIN) 100 MG capsule Take 100 mg by mouth 3 (three) times daily.       insulin NPH-insulin regular (NOVOLIN 70/30) 100 unit/mL (70-30) injection Inject 30 Units into the skin once daily. AND 10 UNITS QHS      insulin NPH-insulin regular, 70/30, (NOVOLIN 70/30) 100 unit/mL (70-30) injection Inject 10 Units into the skin every evening. AND 30 UNITS QAM      labetalol (NORMODYNE) 200 MG tablet Take 200 mg by mouth 2 (two) times daily.      losartan (COZAAR) 25 MG tablet Take 25 mg by mouth once daily.       omeprazole (PRILOSEC) 40 MG capsule Take 40 mg by mouth once daily.      pravastatin (PRAVACHOL) 40 MG tablet Take 40 mg by mouth every evening.       ranitidine (ZANTAC) 150 MG tablet Take 150 mg by mouth 2 (two) times daily.       sevelamer carbonate (RENVELA) 800 mg Tab Take 1,600 mg by mouth once daily. With DINNER and 800 mg BID with other meals      sevelamer carbonate (RENVELA) 800 mg Tab Take 800 mg by mouth 2 (two) times daily. With  meals, and 1600mg daily with the largest meal      vit B comp no.3/folic/C/biotin (TOM-ANA RX ORAL) Take 1 tablet by mouth once daily.      ondansetron (ZOFRAN-ODT) 8 MG TbDL Take 8 mg by mouth every 12 (twelve) hours as needed.      promethazine (PHENERGAN) 12.5 MG Tab Take 12.5 mg by mouth every 8 (eight) hours as needed.         Scheduled meds:   sodium chloride 0.9%   Intravenous Once    cetirizine  10 mg Oral Every other day    famotidine  20 mg Oral Daily    furosemide  20 mg Oral BID    gabapentin  100 mg Oral TID    insulin aspart protamine-insulin aspart  20 Units Subcutaneous Daily    insulin aspart protamine-insulin aspart  12 Units Subcutaneous QHS    labetalol  200 mg Oral BID    mupirocin   Nasal BID    pantoprazole  40 mg Oral Daily    pravastatin  40 mg Oral QHS    senna-docusate 8.6-50 mg  1 tablet Oral BID    sevelamer HCl  1,600 mg Oral BID WM    sevelamer HCl  800 mg Oral Daily with dinner       Infusions:      PRN meds:      Review of Systems:  Review of Systems   Constitutional: Negative for chills, fever, malaise/fatigue and weight loss.   HENT: Negative for hearing loss and nosebleeds.    Eyes: Negative for blurred vision, double vision and photophobia.   Respiratory: Negative for cough, shortness of breath and wheezing.    Cardiovascular: Negative for chest pain, palpitations and leg swelling.   Gastrointestinal: Negative for abdominal pain, constipation, diarrhea, heartburn, nausea and vomiting.   Genitourinary: Negative for dysuria, frequency and urgency.   Musculoskeletal: Negative for falls, joint pain and myalgias.   Skin: Negative for itching and rash.   Neurological: Negative for dizziness, speech change, focal weakness, loss of consciousness and headaches.   Endo/Heme/Allergies: Does not bruise/bleed easily.   Psychiatric/Behavioral: Negative for depression and substance abuse. The patient is not nervous/anxious.        OBJECTIVE:     Vital Signs and IO (Last  24H):  Temp:  [96.2 °F (35.7 °C)-98.8 °F (37.1 °C)]   Pulse:  [63-81]   Resp:  [18-20]   BP: (101-164)/(34-78)   SpO2:  [93 %-99 %]   I/O last 3 completed shifts:  In: 500 [Other:500]  Out: 3500 [Other:3500]    Wt Readings from Last 5 Encounters:   10/23/19 81.6 kg (179 lb 14.3 oz)   09/13/18 79.8 kg (176 lb)   03/15/18 80.3 kg (177 lb)   04/20/16 84.8 kg (187 lb)   11/10/15 81.6 kg (180 lb)           Physical Exam:  Physical Exam   Constitutional: She is oriented to person, place, and time. She appears well-developed and well-nourished.   HENT:   Head: Normocephalic and atraumatic.   Mouth/Throat: Oropharynx is clear and moist.   Eyes: Pupils are equal, round, and reactive to light. EOM are normal. No scleral icterus.   Neck: Neck supple.   Cardiovascular: Normal rate and regular rhythm.   Pulmonary/Chest: Effort normal. No stridor. No respiratory distress.   Abdominal: Soft. She exhibits no distension.   Musculoskeletal: Normal range of motion. She exhibits no edema or deformity.   Neurological: She is alert and oriented to person, place, and time. No cranial nerve deficit.   Skin: Skin is warm and dry. No rash noted. She is not diaphoretic. No erythema.   Psychiatric: She has a normal mood and affect. Her behavior is normal.       Body mass index is 29.04 kg/m².    Laboratory:  Recent Labs   Lab 10/23/19  1040 10/24/19  0516 10/25/19  0512    137 128*   K 4.7 5.6* 5.1    102 96   CO2 23 22* 20*   BUN 46* 58* 46*   CREATININE 6.2* 7.1* 6.1*   ESTGFRAFRICA 6.7* 5.7* 6.9*   EGFRNONAA 5.8* 5.0* 5.9*   * 59* 233*       Recent Labs   Lab 10/23/19  1040 10/24/19  0516 10/25/19  0512   CALCIUM 7.4* 7.6* 7.5*   ALBUMIN 2.9*  --   --    PHOS  --  7.1* 6.1*   MG  --  2.2 2.1             No results for input(s): POCTGLUCOSE in the last 168 hours.    Recent Labs   Lab 10/24/19  0515   Hemoglobin A1C 6.2       Recent Labs   Lab 10/23/19  1040 10/25/19  0512   WBC 4.95 5.43   HGB 11.3* 11.9*   HCT 35.5* 38.0    PLT 84* 87*   * 105*   MCHC 31.8* 31.3*   MONO 9.1  0.5  --        Recent Labs   Lab 10/23/19  1040   BILITOT 1.2*   PROT 6.2   ALBUMIN 2.9*   ALKPHOS 122   ALT 18   AST 23             Invalid input(s): LEUCOCYTESUR          Microbiology Results (last 7 days)     ** No results found for the last 168 hours. **            ASSESSMENT/PLAN:     ESRD on HD MWF via AVF  Continue current dialysis prescription.  HD MWF  Renal diet - low K, low phos.  No IVs or BP checks on access and/or non-dominant arm.    Anemia of CKD  Hgb and HCT are acceptable. Monitor.  Will provide SARAH BETH and/or IV iron PRN.    MBD / Secondary HPT  Ca, phos, PTH and vitamin D levels are acceptable.   Phos binders, vitamin D analogues and calcimimetics as needed.    HTN  BP seem controlled.   Tolerate asymptomatic HTN up to -160.  Continue home meds.  Low sodium diet.    Fall with pelvic fracture.  Plan per Surg and primary team.    Thank you for allowing us to participate in the care of your patient!   We will follow the patient and provide recommendations as needed.    Valerie Chester NP    [facility]  [address] [facility]  [place] [address]    [phone] - tel  [phone] - fax    10/25/2019 2:46 PM

## 2019-10-25 NOTE — ASSESSMENT & PLAN NOTE
Pain control low dose norco, well controlled  PT/OT will evaluate patient once Ortho clears  Discontinue blood pressure medications that can contribute to patient's weakness  Decreased insulin regimen due to hypoglycemia that contribute to patient's weakness.    HbA1c 6.2. With patient's age, would have goal HbA1c 8 to reduce risk of hypoglycemia.

## 2019-10-25 NOTE — PT/OT/SLP PROGRESS
Physical Therapy      Patient Name:  Brandi Camacho   MRN:  [id number]    Patient not seen today secondary to in Dialysis and awaiting ortho clearance for PT. PT spoke with RN who states he has been in touch with ortho RN and getting clearance and WB status soon. Will follow-up 10/26/19.    Marsha Garcia, PT

## 2019-10-25 NOTE — SUBJECTIVE & OBJECTIVE
Interval History:  Currently receiving dialysis.  Patient reports feeling well.  Feels a little tired and weak but otherwise feels okay.  Discussed with nursing regarding getting in contact with Orthopedics for clearance to have physical therapy work with the patient.  HbA1c 6.2.  Will liberate insulin requirements.  Blood pressure continues to be normal/low off of blood pressure medications.    Review of Systems   Constitutional: Negative for chills, fatigue, fever and unexpected weight change.   HENT: Negative for sore throat.    Eyes: Negative for visual disturbance.   Respiratory: Negative for cough, chest tightness and shortness of breath.    Cardiovascular: Negative for chest pain.   Gastrointestinal: Negative for abdominal pain, constipation, diarrhea, nausea and vomiting.   Endocrine: Negative for polyuria.   Genitourinary: Negative for dysuria and hematuria.   Neurological: Negative for headaches.     Objective:     Vital Signs (Most Recent):  Temp: 98.2 °F (36.8 °C) (10/25/19 0800)  Pulse: 63 (10/25/19 0800)  Resp: 18 (10/25/19 0800)  BP: 102/78 (10/25/19 0800)  SpO2: (!) 93 % (10/25/19 0649) Vital Signs (24h Range):  Temp:  [96.2 °F (35.7 °C)-98.8 °F (37.1 °C)] 98.2 °F (36.8 °C)  Pulse:  [63-72] 63  Resp:  [18-20] 18  SpO2:  [93 %-99 %] 93 %  BP: (101-164)/(34-78) 102/78     Weight: 81.6 kg (179 lb 14.3 oz)  Body mass index is 29.04 kg/m².    Intake/Output Summary (Last 24 hours) at 10/25/2019 1000  Last data filed at 10/24/2019 1610  Gross per 24 hour   Intake 500 ml   Output 3500 ml   Net -3000 ml      Physical Exam  Constitutional: She is oriented to person, place, and time. She appears well-developed and well-nourished. No distress.   Frail appearing   HENT:   Right Ear: External ear normal.   Left Ear: External ear normal.   Eyes: Conjunctivae and EOM are normal. Right eye exhibits no discharge. Left eye exhibits no discharge.   Neck: Normal range of motion.   Cardiovascular: Normal rate and regular  rhythm. Exam reveals no gallop and no friction rub.   Murmur heard.  Pulmonary/Chest: Effort normal and breath sounds normal. No respiratory distress. She has no wheezes. She has no rales. She exhibits no tenderness.   Musculoskeletal: She exhibits no edema or tenderness.   Neurological: She is alert and oriented to person, place, and time.   Skin: Skin is warm. She is not diaphoretic.   Psychiatric: She has a normal mood and affect. Her behavior is normal. Judgment and thought content normal.   Nursing note and vitals reviewed.    Significant Labs:   CBC:   Recent Labs   Lab 10/23/19  1040 10/25/19  0512   WBC 4.95 5.43   HGB 11.3* 11.9*   HCT 35.5* 38.0   PLT 84* 87*     CMP:   Recent Labs   Lab 10/23/19  1040 10/24/19  0516 10/25/19  0512    137 128*   K 4.7 5.6* 5.1    102 96   CO2 23 22* 20*   * 59* 233*   BUN 46* 58* 46*   CREATININE 6.2* 7.1* 6.1*   CALCIUM 7.4* 7.6* 7.5*   PROT 6.2  --   --    ALBUMIN 2.9*  --   --    BILITOT 1.2*  --   --    ALKPHOS 122  --   --    AST 23  --   --    ALT 18  --   --    ANIONGAP 13 13 12   EGFRNONAA 5.8* 5.0* 5.9*

## 2019-10-25 NOTE — NURSING
HD complete, tolerated well. vss  Net UF 3L  Report to Owen Brown transported back to unit via bed

## 2019-10-26 PROBLEM — N18.6 ESRD (END STAGE RENAL DISEASE): Chronic | Status: ACTIVE | Noted: 2019-10-23

## 2019-10-26 LAB
ANION GAP SERPL CALC-SCNC: 13 MMOL/L (ref 8–16)
BUN SERPL-MCNC: 38 MG/DL (ref 8–23)
CALCIUM SERPL-MCNC: 7.9 MG/DL (ref 8.7–10.5)
CHLORIDE SERPL-SCNC: 96 MMOL/L (ref 95–110)
CO2 SERPL-SCNC: 23 MMOL/L (ref 23–29)
CREAT SERPL-MCNC: 5.2 MG/DL (ref 0.5–1.4)
EST. GFR  (AFRICAN AMERICAN): 8.3 ML/MIN/1.73 M^2
EST. GFR  (NON AFRICAN AMERICAN): 7.2 ML/MIN/1.73 M^2
GLUCOSE SERPL-MCNC: 129 MG/DL (ref 70–110)
GLUCOSE SERPL-MCNC: 131 MG/DL (ref 70–110)
GLUCOSE SERPL-MCNC: 207 MG/DL (ref 70–110)
GLUCOSE SERPL-MCNC: 248 MG/DL (ref 70–110)
GLUCOSE SERPL-MCNC: 252 MG/DL (ref 70–110)
MAGNESIUM SERPL-MCNC: 2.1 MG/DL (ref 1.6–2.6)
PHOSPHATE SERPL-MCNC: 5.8 MG/DL (ref 2.7–4.5)
POTASSIUM SERPL-SCNC: 4.8 MMOL/L (ref 3.5–5.1)
SODIUM SERPL-SCNC: 132 MMOL/L (ref 136–145)

## 2019-10-26 PROCEDURE — 96372 THER/PROPH/DIAG INJ SC/IM: CPT | Mod: 59

## 2019-10-26 PROCEDURE — 97162 PT EVAL MOD COMPLEX 30 MIN: CPT

## 2019-10-26 PROCEDURE — 82962 GLUCOSE BLOOD TEST: CPT

## 2019-10-26 PROCEDURE — 63600175 PHARM REV CODE 636 W HCPCS: Performed by: FAMILY MEDICINE

## 2019-10-26 PROCEDURE — 36415 COLL VENOUS BLD VENIPUNCTURE: CPT

## 2019-10-26 PROCEDURE — 25000003 PHARM REV CODE 250: Mod: GZ | Performed by: NURSE PRACTITIONER

## 2019-10-26 PROCEDURE — 80048 BASIC METABOLIC PNL TOTAL CA: CPT

## 2019-10-26 PROCEDURE — 63600175 PHARM REV CODE 636 W HCPCS: Mod: GZ | Performed by: NURSE PRACTITIONER

## 2019-10-26 PROCEDURE — G0378 HOSPITAL OBSERVATION PER HR: HCPCS

## 2019-10-26 PROCEDURE — 84100 ASSAY OF PHOSPHORUS: CPT

## 2019-10-26 PROCEDURE — 83735 ASSAY OF MAGNESIUM: CPT

## 2019-10-26 RX ADMIN — MUPIROCIN: 20 OINTMENT TOPICAL at 09:10

## 2019-10-26 RX ADMIN — PANTOPRAZOLE SODIUM 40 MG: 40 TABLET, DELAYED RELEASE ORAL at 06:10

## 2019-10-26 RX ADMIN — GABAPENTIN 100 MG: 100 CAPSULE ORAL at 08:10

## 2019-10-26 RX ADMIN — INSULIN ASPART 2 UNITS: 100 INJECTION, SOLUTION INTRAVENOUS; SUBCUTANEOUS at 04:10

## 2019-10-26 RX ADMIN — GABAPENTIN 100 MG: 100 CAPSULE ORAL at 09:10

## 2019-10-26 RX ADMIN — LABETALOL HYDROCHLORIDE 200 MG: 200 TABLET, FILM COATED ORAL at 09:10

## 2019-10-26 RX ADMIN — INSULIN ASPART 20 UNITS: 100 INJECTION, SUSPENSION SUBCUTANEOUS at 08:10

## 2019-10-26 RX ADMIN — FUROSEMIDE 20 MG: 20 TABLET ORAL at 09:10

## 2019-10-26 RX ADMIN — RENAGEL 800 MG: 800 TABLET ORAL at 04:10

## 2019-10-26 RX ADMIN — LABETALOL HYDROCHLORIDE 200 MG: 200 TABLET, FILM COATED ORAL at 08:10

## 2019-10-26 RX ADMIN — MUPIROCIN: 20 OINTMENT TOPICAL at 08:10

## 2019-10-26 RX ADMIN — FUROSEMIDE 20 MG: 20 TABLET ORAL at 06:10

## 2019-10-26 RX ADMIN — SENNOSIDES AND DOCUSATE SODIUM 1 TABLET: 8.6; 5 TABLET ORAL at 08:10

## 2019-10-26 RX ADMIN — INSULIN ASPART 2 UNITS: 100 INJECTION, SOLUTION INTRAVENOUS; SUBCUTANEOUS at 12:10

## 2019-10-26 RX ADMIN — FAMOTIDINE 20 MG: 20 TABLET ORAL at 09:10

## 2019-10-26 RX ADMIN — GABAPENTIN 100 MG: 100 CAPSULE ORAL at 02:10

## 2019-10-26 RX ADMIN — INSULIN ASPART 12 UNITS: 100 INJECTION, SUSPENSION SUBCUTANEOUS at 04:10

## 2019-10-26 RX ADMIN — SENNOSIDES AND DOCUSATE SODIUM 1 TABLET: 8.6; 5 TABLET ORAL at 09:10

## 2019-10-26 RX ADMIN — CETIRIZINE HYDROCHLORIDE 10 MG: 10 TABLET, FILM COATED ORAL at 09:10

## 2019-10-26 RX ADMIN — RENAGEL 1600 MG: 800 TABLET ORAL at 12:10

## 2019-10-26 RX ADMIN — PRAVASTATIN SODIUM 40 MG: 40 TABLET ORAL at 08:10

## 2019-10-26 RX ADMIN — RENAGEL 1600 MG: 800 TABLET ORAL at 07:10

## 2019-10-26 RX ADMIN — HYDROCODONE BITARTRATE AND ACETAMINOPHEN 1 TABLET: 5; 325 TABLET ORAL at 08:10

## 2019-10-26 RX ADMIN — INSULIN ASPART 1 UNITS: 100 INJECTION, SOLUTION INTRAVENOUS; SUBCUTANEOUS at 11:10

## 2019-10-26 NOTE — CONSULTS
Nephrology Consult Progress Note        Patient Name: Barndi Camacho  MRN: [id number]    Patient Class: OP- Observation   Admission Date: 10/23/2019  Length of Stay: 0 days  Date of Service: 10/26/2019    Attending Physician: Conner Nunez MD  Primary Care Provider: Greg Skinner Iv, MD    Reason for Consult: esrd/anemia/htn/shpt/fall with pelvic fracture    SUBJECTIVE:     HPI: 79F with breast cancer, DM2, GERD, HTN, ESRD on HD MWF, anemia, presents after a fall at home with pains and aches. She missed HD today. Imaging shows acute nondisplaced fracture of the inferior right pubic ramus with questionable nondisplaced fracture of the anterior column of the right acetabulum. Renal consulted for co-management.    10/24  Seen today while on dialysis.  Tolerating treatment well, no new complaints.  Missed her treatment yesterday, normally dialyzes on MWF.  Order placed for MWF HD.  10/25  Seen today on dialysis.  NAD noted, no new complaint of any kind.  VSS, K+ 5.1 this am.  10/26 VSS, no new complains, HD on Monday or PRN.    Past Medical History:   Diagnosis Date    Anemia     sees Dr. Loyola and Dr. Turner    Anticoagulant long-term use     aspirin    Arthritis     Cancer     left breast ca    Diabetes mellitus     Encounter for blood transfusion     GERD (gastroesophageal reflux disease)     Hypertension     sees Dr. Farmer    Renal disorder     CRF on hemodialysis MWF    Urinary tract infection      Past Surgical History:   Procedure Laterality Date    AV FISTULA PLACEMENT Right     BREAST SURGERY  2011    left mastectomy [facility] dr begum     SECTION      x2    [facility] precancerous adenomatous      [facility], ampulla of vater    HYSTERECTOMY      EDI    left knee patella FRACTURE      MODIFIED RADICAL MASTECTOMY W/ AXILLARY LYMPH NODE DISSECTION      right knee orthoscope       Family History   Problem Relation Age of Onset    Cancer Sister     Cancer Mother          pancreatic    Diabetes Mother     Hearing loss Mother     Cancer Father         bladder    Diabetes Father     Cancer Sister         half sister, breast     Social History     Tobacco Use    Smoking status: Never Smoker    Smokeless tobacco: Never Used   Substance Use Topics    Alcohol use: No    Drug use: No       Review of patient's allergies indicates:   Allergen Reactions    Metformin Rash and Nausea And Vomiting     Other reaction(s): Unknown    Bactrim [sulfamethoxazole-trimethoprim] Rash    Sulfamethoxazole Rash     Other reaction(s): Rash  Other reaction(s): Rash    Trimethoprim Rash     Other reaction(s): Rash  Other reaction(s): Rash       Outpatient meds:  No current facility-administered medications on file prior to encounter.      Current Outpatient Medications on File Prior to Encounter   Medication Sig Dispense Refill    cetirizine (ZYRTEC) 10 MG tablet Take 10 mg by mouth once daily.      furosemide (LASIX) 20 MG tablet Take 20 mg by mouth 2 (two) times daily.       gabapentin (NEURONTIN) 100 MG capsule Take 100 mg by mouth 3 (three) times daily.       insulin NPH-insulin regular (NOVOLIN 70/30) 100 unit/mL (70-30) injection Inject 30 Units into the skin once daily. AND 10 UNITS QHS      insulin NPH-insulin regular, 70/30, (NOVOLIN 70/30) 100 unit/mL (70-30) injection Inject 10 Units into the skin every evening. AND 30 UNITS QAM      labetalol (NORMODYNE) 200 MG tablet Take 200 mg by mouth 2 (two) times daily.      losartan (COZAAR) 25 MG tablet Take 25 mg by mouth once daily.       omeprazole (PRILOSEC) 40 MG capsule Take 40 mg by mouth once daily.      pravastatin (PRAVACHOL) 40 MG tablet Take 40 mg by mouth every evening.       ranitidine (ZANTAC) 150 MG tablet Take 150 mg by mouth 2 (two) times daily.       sevelamer carbonate (RENVELA) 800 mg Tab Take 1,600 mg by mouth once daily. With DINNER and 800 mg BID with other meals      sevelamer carbonate (RENVELA) 800 mg  Tab Take 800 mg by mouth 2 (two) times daily. With meals, and 1600mg daily with the largest meal      vit B comp no.3/folic/C/biotin (TOM-ANA RX ORAL) Take 1 tablet by mouth once daily.      ondansetron (ZOFRAN-ODT) 8 MG TbDL Take 8 mg by mouth every 12 (twelve) hours as needed.      promethazine (PHENERGAN) 12.5 MG Tab Take 12.5 mg by mouth every 8 (eight) hours as needed.         Scheduled meds:   sodium chloride 0.9%   Intravenous Once    cetirizine  10 mg Oral Every other day    famotidine  20 mg Oral Daily    furosemide  20 mg Oral BID    gabapentin  100 mg Oral TID    insulin aspart protamine-insulin aspart  20 Units Subcutaneous Daily    insulin aspart protamine-insulin aspart  12 Units Subcutaneous QHS    labetalol  200 mg Oral BID    mupirocin   Nasal BID    pantoprazole  40 mg Oral Daily    pravastatin  40 mg Oral QHS    senna-docusate 8.6-50 mg  1 tablet Oral BID    sevelamer HCl  1,600 mg Oral BID WM    sevelamer HCl  800 mg Oral Daily with dinner       Infusions:      PRN meds:      Review of Systems:  ROS    OBJECTIVE:     Vital Signs and IO (Last 24H):  Temp:  [97.8 °F (36.6 °C)-98.6 °F (37 °C)]   Pulse:  [63-78]   Resp:  [16-19]   BP: (104-154)/(54-82)   SpO2:  [95 %]   I/O last 3 completed shifts:  In: 500 [Other:500]  Out: 3500 [Other:3500]    Wt Readings from Last 5 Encounters:   10/23/19 81.6 kg (179 lb 14.3 oz)   09/13/18 79.8 kg (176 lb)   03/15/18 80.3 kg (177 lb)   04/20/16 84.8 kg (187 lb)   11/10/15 81.6 kg (180 lb)           Physical Exam:  Physical Exam   Constitutional: She is oriented to person, place, and time. She appears well-developed and well-nourished.   HENT:   Head: Normocephalic and atraumatic.   Mouth/Throat: Oropharynx is clear and moist.   Eyes: Pupils are equal, round, and reactive to light. EOM are normal. No scleral icterus.   Neck: Neck supple.   Cardiovascular: Normal rate and regular rhythm.   Pulmonary/Chest: Effort normal. No stridor. No  respiratory distress.   Abdominal: Soft. She exhibits no distension.   Musculoskeletal: Normal range of motion. She exhibits no edema or deformity.   Neurological: She is alert and oriented to person, place, and time. No cranial nerve deficit.   Skin: Skin is warm and dry. No rash noted. She is not diaphoretic. No erythema.   Psychiatric: She has a normal mood and affect. Her behavior is normal.       Body mass index is 29.04 kg/m².    Laboratory:  Recent Labs   Lab 10/24/19  0516 10/25/19  0512 10/26/19  0533    128* 132*   K 5.6* 5.1 4.8    96 96   CO2 22* 20* 23   BUN 58* 46* 38*   CREATININE 7.1* 6.1* 5.2*   ESTGFRAFRICA 5.7* 6.9* 8.3*   EGFRNONAA 5.0* 5.9* 7.2*   GLU 59* 233* 131*       Recent Labs   Lab 10/23/19  1040 10/24/19  0516 10/25/19  0512 10/26/19  0533   CALCIUM 7.4* 7.6* 7.5* 7.9*   ALBUMIN 2.9*  --   --   --    PHOS  --  7.1* 6.1* 5.8*   MG  --  2.2 2.1 2.1             No results for input(s): POCTGLUCOSE in the last 168 hours.    Recent Labs   Lab 10/24/19  0515   Hemoglobin A1C 6.2       Recent Labs   Lab 10/23/19  1040 10/25/19  0512   WBC 4.95 5.43   HGB 11.3* 11.9*   HCT 35.5* 38.0   PLT 84* 87*   * 105*   MCHC 31.8* 31.3*   MONO 9.1  0.5  --        Recent Labs   Lab 10/23/19  1040   BILITOT 1.2*   PROT 6.2   ALBUMIN 2.9*   ALKPHOS 122   ALT 18   AST 23             Invalid input(s): LEUCOCYTESUR          Microbiology Results (last 7 days)     ** No results found for the last 168 hours. **            ASSESSMENT/PLAN:     ESRD on HD MWF via AV[facility]  Continue current dialysis prescription.  HD MWF  Renal diet - low K, low phos.  No IVs or BP checks on access and/or non-dominant arm.    Anemia of CKD  Hgb and HCT are acceptable. Monitor.  Will provide SARAH BETH and/or IV iron PRN.    MBD / Secondary HPT  Ca, phos, PTH and vitamin D levels are acceptable.   Phos binders, vitamin D analogues and calcimimetics as needed.    HTN  BP seem controlled.   Tolerate asymptomatic HTN up to SBP  150-160.  Continue home meds.  Low sodium diet.    Fall with pelvic fracture.  Plan per Surg and primary team.    Thank you for allowing us to participate in the care of your patient!   We will follow the patient and provide recommendations as needed.    Lavell Wolf NP    [facility]  [address] [place] [address]  [place] [address]    [phone] - tel  [phone] - fax    10/26/2019 2:46 PM

## 2019-10-26 NOTE — PROGRESS NOTES
"[facility]  Progress Note    Patient Name: Brandi Camacho  MRN: [id number]  Patient Class: OP- Observation   Admission Date: 10/23/2019  Length of Stay: 0 days  Attending Physician: Conner Nunez MD  Primary Care Provider: Greg Skinner Iv, MD        Subjective:     Principal Problem:Intractable pain        HPI:  Ms. Camacho presents today with complaints of back pain. It is severe. It is associated with a mechanical fall last night and weakness. She denies head trauma, LOC, fever, chills, N/V/D, or dizziness. She's currently AAOx4 and states she fell last night that her legs "just gave out". She's in significant pain when she tries to move. She has a history of ESRD on HD MWF, HTN, IDDM, GERD, arthritis, and osteopenia. She lives at home with her elderly .    Overview/Hospital Course:  Patient was found to have an acute nondisplaced fracture of the inferior right pubic ramus with questionable nondisplaced fracture of the anterior column of the right acetabulum on CT imaging.  Yesterday was awaiting orthopedics for clearance to work with physical therapy - Dr. Campos recommended protected weight-bearing on the right lower extremity.   Discontinued antihypertensive medications due to low/normal blood pressures and decreased home insulin with HbA1c 6.2.  10/26: pain is well controlled. Eating lunch in the bed. Waiting on PT eval for dispo.       Review of Systems   Constitutional: Negative for chills, fatigue, fever and unexpected weight change.   HENT: Negative for sore throat.    Eyes: Negative for visual disturbance.   Respiratory: Negative for cough, chest tightness and shortness of breath.    Cardiovascular: Negative for chest pain.   Gastrointestinal: Negative for abdominal pain, constipation, diarrhea, nausea and vomiting.   Endocrine: Negative for polyuria.   Genitourinary: Negative for dysuria and hematuria.   Neurological: Negative for headaches.     Objective: "     Vital Signs (Most Recent):  Temp: 98.1 °F (36.7 °C) (10/26/19 1204)  Pulse: 71 (10/26/19 1204)  Resp: 17 (10/26/19 1204)  BP: 124/78 (10/26/19 1204)  SpO2: (!) 93 % (10/26/19 1204) Vital Signs (24h Range):  Temp:  [97.8 °F (36.6 °C)-98.6 °F (37 °C)] 98.1 °F (36.7 °C)  Pulse:  [64-78] 71  Resp:  [16-19] 17  SpO2:  [93 %-95 %] 93 %  BP: (104-148)/(54-78) 124/78     Weight: 81.6 kg (179 lb 14.3 oz)  Body mass index is 29.04 kg/m².    Intake/Output Summary (Last 24 hours) at 10/26/2019 1216  Last data filed at 10/26/2019 0800  Gross per 24 hour   Intake 240 ml   Output --   Net 240 ml        Constitutional: She is oriented to person, place, and time. She appears well-developed and well-nourished. No distress.   Frail appearing   HENT:   Right Ear: External ear normal.   Left Ear: External ear normal.   Eyes: Conjunctivae and EOM are normal. Right eye exhibits no discharge. Left eye exhibits no discharge.   Neck: Normal range of motion.   Cardiovascular: Normal rate and regular rhythm. Exam reveals no gallop and no friction rub.   Murmur heard.  Pulmonary/Chest: Effort normal and breath sounds normal. No respiratory distress. She has no wheezes. She has no rales. She exhibits no tenderness.   Musculoskeletal: Trace pedal edema. Neurovascularly intact distal LE b/l.   Neurological: She is alert and oriented to person, place, and time.   Skin: Skin is warm. She is not diaphoretic.   Psychiatric: She has a normal mood and affect. Her behavior is normal. Judgment and thought content normal.   Nursing note and vitals reviewed.    Significant Labs:   CBC:   Recent Labs   Lab 10/25/19  0512   WBC 5.43   HGB 11.9*   HCT 38.0   PLT 87*     CMP:   Recent Labs   Lab 10/25/19  0512 10/26/19  0533   * 132*   K 5.1 4.8   CL 96 96   CO2 20* 23   * 131*   BUN 46* 38*   CREATININE 6.1* 5.2*   CALCIUM 7.5* 7.9*   ANIONGAP 12 13   EGFRNONAA 5.9* 7.2*       Imaging reviewed:  Ct pelvis:  Impression       Acute nondisplaced  fracture of the inferior right pubic ramus with questionable nondisplaced fracture of the anterior column of the right acetabulum.    Colonic diverticulosis    Diffuse anasarca           Assessment/Plan:      * Intractable pain  Pain control low dose norco, well controlled        Weakness    Discontinued blood pressure medications that can contribute to patient's weakness  Decreased insulin regimen due to hypoglycemia that contribute to patient's weakness.    HbA1c 6.2. With patient's age, would have goal HbA1c 8 to reduce risk of hypoglycemia.    Closed fracture of right inferior pubic ramus  Conservative, nonoperative mgmt as per orthopedics   Consult PT to eval and treat - HH with PT vs. Inpatient PT?  SCDs for DVT prophylaxis   Progressive mobility protocol   CT and XRs completed  PT/OT eval is pending - ortho recommended protected weight bearing RLE        ESRD (end stage renal disease)  Consulted Dr. Wolf for HD management  Scheduled for HD MWF  Renal diet   Continue phosphate binders      Type 2 diabetes mellitus with kidney complication, with long-term current use of insulin  Accuchecks ACHS with low dose ISS in addition to her scheduled insulin        VTE Risk Mitigation (From admission, onward)         Ordered     Place sequential compression device  Until discontinued      10/23/19 1509     IP VTE HIGH RISK PATIENT  Once      10/23/19 1509                      Conner Nunez MD  Department of Hospital Medicine   [facility]

## 2019-10-26 NOTE — ASSESSMENT & PLAN NOTE
Conservative, nonoperative mgmt as per orthopedics   Consult PT to eval and treat - HH with PT vs. Inpatient PT?  SCDs for DVT prophylaxis   Progressive mobility protocol   CT and XRs completed  PT/OT eval is pending - ortho recommended protected weight bearing RLE

## 2019-10-26 NOTE — ASSESSMENT & PLAN NOTE
Discontinued blood pressure medications that can contribute to patient's weakness  Decreased insulin regimen due to hypoglycemia that contribute to patient's weakness.    HbA1c 6.2. With patient's age, would have goal HbA1c 8 to reduce risk of hypoglycemia.

## 2019-10-26 NOTE — PLAN OF CARE
Goals to be met by: discharge    Patient will increase functional independence with mobility by performing: initiated 10/26/19     . Supine to sit with MInimal Assistance   . Sit to supine with MInimal Assistance   . Sit to stand transfer with Minimal Assistance   . Bed to chair transfer with Minimal Assistance using Rolling Walker   . Gait  x 25 ft feet with Minimal Assistance using Rolling Walker.

## 2019-10-26 NOTE — ASSESSMENT & PLAN NOTE
Consulted Dr. Wolf for HD management  Scheduled for HD MWF  Renal diet   Continue phosphate binders

## 2019-10-26 NOTE — PT/OT/SLP EVAL
"Physical Therapy Evaluation    Patient Name:  Brandi Camacho   MRN:  [id number]    Recommendations:     Discharge Recommendations:  home health PT, nursing facility, skilled   Discharge Equipment Recommendations: none   Barriers to discharge: Currently pt requiring significant assistance and lives with her elder . Son lives close by but works as  and gone a lot.    Assessment:     Brandi Camacho is a 79 y.o. female admitted with a medical diagnosis of Intractable pain.  She presents with the following impairments/functional limitations:  weakness, impaired endurance, impaired functional mobilty, gait instability, impaired balance, pain, orthopedic precautions. Per MD note, pt's RLE is "protected" weight bearing, as noted by Dr. Campos. Pt requiring max assist for supine to sit, moderate assist for sit to stand, from elevated bed, and max assist for bed to chair trf, via stand pivot. Pt was able to take 2 steps with RW, mod A and significant reliance on UEs. At this time, PT doesn't feel that patient is safe for discharge home, but would benefit from further acute care PT. Pending progress at time of discharge, home with HHPT versus SNF to be decided.     Rehab Prognosis: Fair; patient would benefit from acute skilled PT services to address these deficits and reach maximum level of function.    Recent Surgery: * No surgery found *      Plan:     During this hospitalization, patient to be seen daily to address the identified rehab impairments via gait training, therapeutic activities, therapeutic exercises, neuromuscular re-education and progress toward the following goals:    · Plan of Care Expires:  11/25/19    Subjective     Chief Complaint: pain; weakness  Patient/Family Comments/goals: home with family but understands may not be possible at this time  Pain/Comfort:  · Pain Rating 1: 0/10  · Location - Side 1: Right  · Location 1: hip  · Pain Addressed 1: Nurse notified, Distraction, " Reposition  · Pain Rating Post-Intervention 1: 8/10    Patients cultural, spiritual, Yazidi conflicts given the current situation:      Living Environment:  Pt lives with her  in a 1 story house [address] and son lives a couple blocks away.  Prior to admission, patients level of function was Spv A of her , utilizing RW and rollator.  Equipment used at home: walker, rolling, rollator, wheelchair, cane, straight.  DME owned (not currently used): none.  Upon discharge, patient will have assistance from  and son PRN.    Objective:     Communicated with nsg prior to session.  Patient found supine with SCD  upon PT entry to room.    General Precautions: Standard, fall   Orthopedic Precautions:(Protected weightbearing RLE per MD)   Braces:       Exams:  · Cognitive Exam:  Patient is oriented to Person, Place, Time and Situation  · Sensation:    · -       Intact  · RUE Strength: WFL  · LUE Strength: WFL  · RLE ROM: 25-50% deficits  · RLE Strength: 2+ to 3/5  · LLE ROM: WFL  · LLE Strength: 4-/5    Functional Mobility:  · Bed Mobility:     · Supine to Sit: maximal assistance  · Transfers:     · Sit to Stand:  moderate assistance with rolling walker  · Bed to Chair: maximal assistance with  no AD  using  Stand Pivot  · Gait: 2 steps w/ RW and mod A  · Balance: seated: fair; standing: poor      Therapeutic Activities and Exercises:   seated hip abduction/adduction and LAQ: x10 BLE    AM-PAC 6 CLICK MOBILITY  Total Score:10     Patient left up in chair with call button in reach and family present.    GOALS:   Multidisciplinary Problems     Physical Therapy Goals        Problem: Physical Therapy Goal    Goal Priority Disciplines Outcome Goal Variances Interventions   Physical Therapy Goal     PT, PT/OT Ongoing, Progressing                     History:     Past Medical History:   Diagnosis Date    Anemia     sees Dr. Loyola and Dr. Turner    Anticoagulant long-term use     aspirin    Arthritis      Cancer     left breast ca    Diabetes mellitus     Encounter for blood transfusion     GERD (gastroesophageal reflux disease)     Hypertension     sees Dr. Farmer    Renal disorder     CRF on hemodialysis MWF    Urinary tract infection        Past Surgical History:   Procedure Laterality Date    AV FISTULA PLACEMENT Right     BREAST SURGERY  2011    left mastectomy [facility] dr begum     SECTION      x2    [facility]ancerous adenomatous      [facility], ampulla of vater    HYSTERECTOMY      EDI    left knee patella FRACTURE      MODIFIED RADICAL MASTECTOMY W/ AXILLARY LYMPH NODE DISSECTION      right knee orthoscope         Time Tracking:     PT Received On: 10/26/19  PT Start Time: 1253     PT Stop Time: 1332  PT Total Time (min): 39 min     Billable Minutes: Evaluation 39 minutes       Chang Kidd, PT  10/26/2019

## 2019-10-26 NOTE — SUBJECTIVE & OBJECTIVE
Review of Systems   Constitutional: Negative for chills, fatigue, fever and unexpected weight change.   HENT: Negative for sore throat.    Eyes: Negative for visual disturbance.   Respiratory: Negative for cough, chest tightness and shortness of breath.    Cardiovascular: Negative for chest pain.   Gastrointestinal: Negative for abdominal pain, constipation, diarrhea, nausea and vomiting.   Endocrine: Negative for polyuria.   Genitourinary: Negative for dysuria and hematuria.   Neurological: Negative for headaches.     Objective:     Vital Signs (Most Recent):  Temp: 98.1 °F (36.7 °C) (10/26/19 1204)  Pulse: 71 (10/26/19 1204)  Resp: 17 (10/26/19 1204)  BP: 124/78 (10/26/19 1204)  SpO2: (!) 93 % (10/26/19 1204) Vital Signs (24h Range):  Temp:  [97.8 °F (36.6 °C)-98.6 °F (37 °C)] 98.1 °F (36.7 °C)  Pulse:  [64-78] 71  Resp:  [16-19] 17  SpO2:  [93 %-95 %] 93 %  BP: (104-148)/(54-78) 124/78     Weight: 81.6 kg (179 lb 14.3 oz)  Body mass index is 29.04 kg/m².    Intake/Output Summary (Last 24 hours) at 10/26/2019 1216  Last data filed at 10/26/2019 0800  Gross per 24 hour   Intake 240 ml   Output --   Net 240 ml        Constitutional: She is oriented to person, place, and time. She appears well-developed and well-nourished. No distress.   Frail appearing   HENT:   Right Ear: External ear normal.   Left Ear: External ear normal.   Eyes: Conjunctivae and EOM are normal. Right eye exhibits no discharge. Left eye exhibits no discharge.   Neck: Normal range of motion.   Cardiovascular: Normal rate and regular rhythm. Exam reveals no gallop and no friction rub.   Murmur heard.  Pulmonary/Chest: Effort normal and breath sounds normal. No respiratory distress. She has no wheezes. She has no rales. She exhibits no tenderness.   Musculoskeletal: Trace pedal edema. Neurovascularly intact distal LE b/l.   Neurological: She is alert and oriented to person, place, and time.   Skin: Skin is warm. She is not diaphoretic.    Psychiatric: She has a normal mood and affect. Her behavior is normal. Judgment and thought content normal.   Nursing note and vitals reviewed.    Significant Labs:   CBC:   Recent Labs   Lab 10/25/19  0512   WBC 5.43   HGB 11.9*   HCT 38.0   PLT 87*     CMP:   Recent Labs   Lab 10/25/19  0512 10/26/19  0533   * 132*   K 5.1 4.8   CL 96 96   CO2 20* 23   * 131*   BUN 46* 38*   CREATININE 6.1* 5.2*   CALCIUM 7.5* 7.9*   ANIONGAP 12 13   EGFRNONAA 5.9* 7.2*       Imaging reviewed:  Ct pelvis:  Impression       Acute nondisplaced fracture of the inferior right pubic ramus with questionable nondisplaced fracture of the anterior column of the right acetabulum.    Colonic diverticulosis    Diffuse anasarca

## 2019-10-27 LAB
ANION GAP SERPL CALC-SCNC: 12 MMOL/L (ref 8–16)
BUN SERPL-MCNC: 55 MG/DL (ref 8–23)
CALCIUM SERPL-MCNC: 7.8 MG/DL (ref 8.7–10.5)
CHLORIDE SERPL-SCNC: 94 MMOL/L (ref 95–110)
CO2 SERPL-SCNC: 23 MMOL/L (ref 23–29)
CREAT SERPL-MCNC: 6.6 MG/DL (ref 0.5–1.4)
EST. GFR  (AFRICAN AMERICAN): 6.2 ML/MIN/1.73 M^2
EST. GFR  (NON AFRICAN AMERICAN): 5.4 ML/MIN/1.73 M^2
GLUCOSE SERPL-MCNC: 110 MG/DL (ref 70–110)
GLUCOSE SERPL-MCNC: 158 MG/DL (ref 70–110)
GLUCOSE SERPL-MCNC: 232 MG/DL (ref 70–110)
GLUCOSE SERPL-MCNC: 236 MG/DL (ref 70–110)
GLUCOSE SERPL-MCNC: 54 MG/DL (ref 70–110)
GLUCOSE SERPL-MCNC: 58 MG/DL (ref 70–110)
MAGNESIUM SERPL-MCNC: 2.1 MG/DL (ref 1.6–2.6)
PHOSPHATE SERPL-MCNC: 6.9 MG/DL (ref 2.7–4.5)
POTASSIUM SERPL-SCNC: 5.4 MMOL/L (ref 3.5–5.1)
SODIUM SERPL-SCNC: 129 MMOL/L (ref 136–145)

## 2019-10-27 PROCEDURE — 97110 THERAPEUTIC EXERCISES: CPT

## 2019-10-27 PROCEDURE — 80048 BASIC METABOLIC PNL TOTAL CA: CPT

## 2019-10-27 PROCEDURE — 36415 COLL VENOUS BLD VENIPUNCTURE: CPT

## 2019-10-27 PROCEDURE — 25000003 PHARM REV CODE 250: Performed by: INTERNAL MEDICINE

## 2019-10-27 PROCEDURE — 63600175 PHARM REV CODE 636 W HCPCS: Mod: GZ | Performed by: FAMILY MEDICINE

## 2019-10-27 PROCEDURE — 97530 THERAPEUTIC ACTIVITIES: CPT

## 2019-10-27 PROCEDURE — 83735 ASSAY OF MAGNESIUM: CPT

## 2019-10-27 PROCEDURE — 25000003 PHARM REV CODE 250: Performed by: NURSE PRACTITIONER

## 2019-10-27 PROCEDURE — 84100 ASSAY OF PHOSPHORUS: CPT

## 2019-10-27 PROCEDURE — 96372 THER/PROPH/DIAG INJ SC/IM: CPT | Mod: 59

## 2019-10-27 PROCEDURE — 82962 GLUCOSE BLOOD TEST: CPT

## 2019-10-27 PROCEDURE — G0378 HOSPITAL OBSERVATION PER HR: HCPCS

## 2019-10-27 RX ORDER — POTASSIUM CHLORIDE 20 MEQ/15ML
40 SOLUTION ORAL ONCE
Status: DISCONTINUED | OUTPATIENT
Start: 2019-10-27 | End: 2019-10-27

## 2019-10-27 RX ADMIN — LABETALOL HYDROCHLORIDE 200 MG: 200 TABLET, FILM COATED ORAL at 08:10

## 2019-10-27 RX ADMIN — MUPIROCIN: 20 OINTMENT TOPICAL at 08:10

## 2019-10-27 RX ADMIN — RENAGEL 800 MG: 800 TABLET ORAL at 05:10

## 2019-10-27 RX ADMIN — FAMOTIDINE 20 MG: 20 TABLET ORAL at 08:10

## 2019-10-27 RX ADMIN — RENAGEL 1600 MG: 800 TABLET ORAL at 07:10

## 2019-10-27 RX ADMIN — Medication 16 G: at 11:10

## 2019-10-27 RX ADMIN — HYDROCODONE BITARTRATE AND ACETAMINOPHEN 1 TABLET: 5; 325 TABLET ORAL at 09:10

## 2019-10-27 RX ADMIN — PANTOPRAZOLE SODIUM 40 MG: 40 TABLET, DELAYED RELEASE ORAL at 06:10

## 2019-10-27 RX ADMIN — GABAPENTIN 100 MG: 100 CAPSULE ORAL at 08:10

## 2019-10-27 RX ADMIN — HYDROCODONE BITARTRATE AND ACETAMINOPHEN 1 TABLET: 5; 325 TABLET ORAL at 06:10

## 2019-10-27 RX ADMIN — PRAVASTATIN SODIUM 40 MG: 40 TABLET ORAL at 08:10

## 2019-10-27 RX ADMIN — FUROSEMIDE 20 MG: 20 TABLET ORAL at 05:10

## 2019-10-27 RX ADMIN — SODIUM POLYSTYRENE SULFONATE 30 G: 15 SUSPENSION ORAL; RECTAL at 01:10

## 2019-10-27 RX ADMIN — INSULIN ASPART 12 UNITS: 100 INJECTION, SUSPENSION SUBCUTANEOUS at 04:10

## 2019-10-27 RX ADMIN — INSULIN ASPART 20 UNITS: 100 INJECTION, SUSPENSION SUBCUTANEOUS at 07:10

## 2019-10-27 RX ADMIN — INSULIN ASPART 2 UNITS: 100 INJECTION, SOLUTION INTRAVENOUS; SUBCUTANEOUS at 07:10

## 2019-10-27 RX ADMIN — RENAGEL 1600 MG: 800 TABLET ORAL at 11:10

## 2019-10-27 RX ADMIN — FUROSEMIDE 20 MG: 20 TABLET ORAL at 08:10

## 2019-10-27 RX ADMIN — GABAPENTIN 100 MG: 100 CAPSULE ORAL at 02:10

## 2019-10-27 RX ADMIN — SENNOSIDES AND DOCUSATE SODIUM 1 TABLET: 8.6; 5 TABLET ORAL at 08:10

## 2019-10-27 NOTE — CONSULTS
Nephrology Consult Progress Note        Patient Name: Brandi Camacho  MRN: [id number]    Patient Class: OP- Observation   Admission Date: 10/23/2019  Length of Stay: 0 days  Date of Service: 10/27/2019    Attending Physician: Conner Nunez MD  Primary Care Provider: Greg Skinner Iv, MD    Reason for Consult: esrd/anemia/htn/shpt/fall with pelvic fracture    SUBJECTIVE:     HPI: 79F with breast cancer, DM2, GERD, HTN, ESRD on HD MWF, anemia, presents after a fall at home with pains and aches. She missed HD today. Imaging shows acute nondisplaced fracture of the inferior right pubic ramus with questionable nondisplaced fracture of the anterior column of the right acetabulum. Renal consulted for co-management.    10/24  Seen today while on dialysis.  Tolerating treatment well, no new complaints.  Missed her treatment yesterday, normally dialyzes on MWF.  Order placed for MWF HD.  10/25  Seen today on dialysis.  NAD noted, no new complaint of any kind.  VSS, K+ 5.1 this am.  10/26 VSS, no new complains, HD on Monday or PRN.  10/27 VSS, no new complains, HD on Monday or PRN. K was high, will give Kayaxelate today.    Past Medical History:   Diagnosis Date    Anemia     sees Dr. Loyola and Dr. Turner    Anticoagulant long-term use     aspirin    Arthritis     Cancer     left breast ca    Diabetes mellitus     Encounter for blood transfusion     GERD (gastroesophageal reflux disease)     Hypertension     sees Dr. Farmer    Renal disorder     CRF on hemodialysis MWF    Urinary tract infection      Past Surgical History:   Procedure Laterality Date    AV FISTULA PLACEMENT Right     BREAST SURGERY  2011    left mastectomy [facility] dr begum     SECTION      x2    [facility] precancerous adenomatous      [facility], ampulla of vater    HYSTERECTOMY      EDI    left knee patella FRACTURE      MODIFIED RADICAL MASTECTOMY W/ AXILLARY LYMPH NODE DISSECTION      right knee orthoscope        Family History   Problem Relation Age of Onset    Cancer Sister     Cancer Mother         pancreatic    Diabetes Mother     Hearing loss Mother     Cancer Father         bladder    Diabetes Father     Cancer Sister         half sister, breast     Social History     Tobacco Use    Smoking status: Never Smoker    Smokeless tobacco: Never Used   Substance Use Topics    Alcohol use: No    Drug use: No       Review of patient's allergies indicates:   Allergen Reactions    Metformin Rash and Nausea And Vomiting     Other reaction(s): Unknown    Bactrim [sulfamethoxazole-trimethoprim] Rash    Sulfamethoxazole Rash     Other reaction(s): Rash  Other reaction(s): Rash    Trimethoprim Rash     Other reaction(s): Rash  Other reaction(s): Rash       Outpatient meds:  No current facility-administered medications on file prior to encounter.      Current Outpatient Medications on File Prior to Encounter   Medication Sig Dispense Refill    cetirizine (ZYRTEC) 10 MG tablet Take 10 mg by mouth once daily.      furosemide (LASIX) 20 MG tablet Take 20 mg by mouth 2 (two) times daily.       gabapentin (NEURONTIN) 100 MG capsule Take 100 mg by mouth 3 (three) times daily.       insulin NPH-insulin regular (NOVOLIN 70/30) 100 unit/mL (70-30) injection Inject 30 Units into the skin once daily. AND 10 UNITS QHS      insulin NPH-insulin regular, 70/30, (NOVOLIN 70/30) 100 unit/mL (70-30) injection Inject 10 Units into the skin every evening. AND 30 UNITS QAM      labetalol (NORMODYNE) 200 MG tablet Take 200 mg by mouth 2 (two) times daily.      losartan (COZAAR) 25 MG tablet Take 25 mg by mouth once daily.       omeprazole (PRILOSEC) 40 MG capsule Take 40 mg by mouth once daily.      pravastatin (PRAVACHOL) 40 MG tablet Take 40 mg by mouth every evening.       ranitidine (ZANTAC) 150 MG tablet Take 150 mg by mouth 2 (two) times daily.       sevelamer carbonate (RENVELA) 800 mg Tab Take 1,600 mg by mouth once  daily. With DINNER and 800 mg BID with other meals      sevelamer carbonate (RENVELA) 800 mg Tab Take 800 mg by mouth 2 (two) times daily. With meals, and 1600mg daily with the largest meal      vit B comp no.3/folic/C/biotin (TOM-ANA RX ORAL) Take 1 tablet by mouth once daily.      ondansetron (ZOFRAN-ODT) 8 MG TbDL Take 8 mg by mouth every 12 (twelve) hours as needed.      promethazine (PHENERGAN) 12.5 MG Tab Take 12.5 mg by mouth every 8 (eight) hours as needed.         Scheduled meds:   sodium chloride 0.9%   Intravenous Once    cetirizine  10 mg Oral Every other day    famotidine  20 mg Oral Daily    furosemide  20 mg Oral BID    gabapentin  100 mg Oral TID    insulin aspart protamine-insulin aspart  20 Units Subcutaneous Daily    insulin aspart protamine-insulin aspart  12 Units Subcutaneous QHS    labetalol  200 mg Oral BID    mupirocin   Nasal BID    pantoprazole  40 mg Oral Daily    potassium chloride 10%  40 mEq Oral Once    pravastatin  40 mg Oral QHS    senna-docusate 8.6-50 mg  1 tablet Oral BID    sevelamer HCl  1,600 mg Oral BID WM    sevelamer HCl  800 mg Oral Daily with dinner       Infusions:      PRN meds:      Review of Systems:  ROS    OBJECTIVE:     Vital Signs and IO (Last 24H):  Temp:  [97.4 °F (36.3 °C)-98.2 °F (36.8 °C)]   Pulse:  [63-71]   Resp:  [17-19]   BP: (110-135)/(50-81)   SpO2:  [93 %-97 %]   I/O last 3 completed shifts:  In: 780 [P.O.:780]  Out: -     Wt Readings from Last 5 Encounters:   10/23/19 81.6 kg (179 lb 14.3 oz)   09/13/18 79.8 kg (176 lb)   03/15/18 80.3 kg (177 lb)   04/20/16 84.8 kg (187 lb)   11/10/15 81.6 kg (180 lb)           Physical Exam:  Physical Exam   Constitutional: She is oriented to person, place, and time. She appears well-developed and well-nourished.   HENT:   Head: Normocephalic and atraumatic.   Mouth/Throat: Oropharynx is clear and moist.   Eyes: Pupils are equal, round, and reactive to light. EOM are normal. No scleral icterus.    Neck: Neck supple.   Cardiovascular: Normal rate and regular rhythm.   Pulmonary/Chest: Effort normal. No stridor. No respiratory distress.   Abdominal: Soft. She exhibits no distension.   Musculoskeletal: Normal range of motion. She exhibits no edema or deformity.   Neurological: She is alert and oriented to person, place, and time. No cranial nerve deficit.   Skin: Skin is warm and dry. No rash noted. She is not diaphoretic. No erythema.   Psychiatric: She has a normal mood and affect. Her behavior is normal.       Body mass index is 29.04 kg/m².    Laboratory:  Recent Labs   Lab 10/25/19  0512 10/26/19  0533 10/27/19  0501   * 132* 129*   K 5.1 4.8 5.4*   CL 96 96 94*   CO2 20* 23 23   BUN 46* 38* 55*   CREATININE 6.1* 5.2* 6.6*   ESTGFRAFRICA 6.9* 8.3* 6.2*   EGFRNONAA 5.9* 7.2* 5.4*   * 131* 236*       Recent Labs   Lab 10/23/19  1040  10/25/19  0512 10/26/19  0533 10/27/19  0501   CALCIUM 7.4*   < > 7.5* 7.9* 7.8*   ALBUMIN 2.9*  --   --   --   --    PHOS  --    < > 6.1* 5.8* 6.9*   MG  --    < > 2.1 2.1 2.1    < > = values in this interval not displayed.             No results for input(s): POCTGLUCOSE in the last 168 hours.    Recent Labs   Lab 10/24/19  0515   Hemoglobin A1C 6.2       Recent Labs   Lab 10/23/19  1040 10/25/19  0512   WBC 4.95 5.43   HGB 11.3* 11.9*   HCT 35.5* 38.0   PLT 84* 87*   * 105*   MCHC 31.8* 31.3*   MONO 9.1  0.5  --        Recent Labs   Lab 10/23/19  1040   BILITOT 1.2*   PROT 6.2   ALBUMIN 2.9*   ALKPHOS 122   ALT 18   AST 23             Invalid input(s): LEUCOCYTESUR          Microbiology Results (last 7 days)     ** No results found for the last 168 hours. **            ASSESSMENT/PLAN:     ESRD on HD MWF via AVF  Continue current dialysis prescription.  HD MWF.  Renal diet - low K, low phos.  No IVs or BP checks on access and/or non-dominant arm.    Anemia of CKD  Hgb and HCT are acceptable. Monitor.  Will provide SARAH BETH and/or IV iron PRN.    MBD /  Secondary HPT  Ca, phos, PTH and vitamin D levels are acceptable.   Phos binders, vitamin D analogues and calcimimetics as needed.    HTN  BP seem controlled.   Tolerate asymptomatic HTN up to -160.  Continue home meds.  Low sodium diet.    Fall with pelvic fracture.  Plan per Surg and primary team.    Thank you for allowing us to participate in the care of your patient!   We will follow the patient and provide recommendations as needed.    Lavell Wolf NP    [facility]  [address] [place] [address]  [place] [address]    [phone] - tel  [phone] - fax    10/27/2019 2:46 PM

## 2019-10-27 NOTE — ASSESSMENT & PLAN NOTE
Conservative, nonoperative mgmt as per orthopedics   SCDs for DVT prophylaxis   Progressive mobility protocol   CT and XRs completed  Continue PT/OT

## 2019-10-27 NOTE — PLAN OF CARE
Patient participated in gait training, therapeutic exercise and therapeutic activities to improve and strength functional mobility and improve ADL's to reduce fall risk.

## 2019-10-27 NOTE — PROGRESS NOTES
"[facility] Medicine  Progress Note    Patient Name: Brandi Camacho  MRN: [id number]  Patient Class: OP- Observation   Admission Date: 10/23/2019  Length of Stay: 0 days  Attending Physician: Conner Nunez MD  Primary Care Provider: Greg Skinner Iv, MD        Subjective:     Principal Problem:Intractable pain        HPI:  Ms. Camacho presents today with complaints of back pain. It is severe. It is associated with a mechanical fall last night and weakness. She denies head trauma, LOC, fever, chills, N/V/D, or dizziness. She's currently AAOx4 and states she fell last night that her legs "just gave out". She's in significant pain when she tries to move. She has a history of ESRD on HD MWF, HTN, IDDM, GERD, arthritis, and osteopenia. She lives at home with her elderly .    Overview/Hospital Course:  Patient was found to have an acute nondisplaced fracture of the inferior right pubic ramus with questionable nondisplaced fracture of the anterior column of the right acetabulum on CT imaging.  Yesterday was awaiting orthopedics for clearance to work with physical therapy - Dr. Campos recommended protected weight-bearing on the right lower extremity.   Discontinued antihypertensive medications due to low/normal blood pressures and decreased home insulin with HbA1c 6.2.  10/26: pain is well controlled. Eating lunch in the bed. Waiting on PT eval for dispo.   10/27: C/o "lots of soreness." Says current pain regimen is working ok. O/w no complaints. Doesn't think she is strong enough to go home.       Review of Systems   Constitutional: Negative for chills, fatigue, fever and unexpected weight change.   HENT: Negative for sore throat.    Eyes: Negative for visual disturbance.   Respiratory: Negative for cough, chest tightness and shortness of breath.    Cardiovascular: Negative for chest pain.   Gastrointestinal: Negative for abdominal pain, constipation, diarrhea, nausea and vomiting. "   Endocrine: Negative for polyuria.   Genitourinary: Negative for dysuria and hematuria.   Neurological: Negative for headaches.     Objective:     Vital Signs (Most Recent):  Temp: 97.8 °F (36.6 °C) (10/27/19 1234)  Pulse: 61 (10/27/19 1234)  Resp: 18 (10/27/19 1234)  BP: (!) 164/69 (10/27/19 1234)  SpO2: (!) 94 % (10/27/19 1234) Vital Signs (24h Range):  Temp:  [97.4 °F (36.3 °C)-98.2 °F (36.8 °C)] 97.8 °F (36.6 °C)  Pulse:  [61-71] 61  Resp:  [18-19] 18  SpO2:  [93 %-97 %] 94 %  BP: (110-164)/(50-81) 164/69     Weight: 81.6 kg (179 lb 14.3 oz)  Body mass index is 29.04 kg/m².    Intake/Output Summary (Last 24 hours) at 10/27/2019 1555  Last data filed at 10/27/2019 1200  Gross per 24 hour   Intake 720 ml   Output --   Net 720 ml        Constitutional: She is oriented to person, place, and time.No distress.   Frail appearing   HENT:   Right Ear: External ear normal.   Left Ear: External ear normal.   Eyes: Conjunctivae and EOM are normal. Right eye exhibits no discharge. Left eye exhibits no discharge.   Neck: Normal range of motion.   Cardiovascular: Normal rate and regular rhythm. Exam reveals no gallop and no friction rub.   Murmur heard.  Pulmonary/Chest: Effort normal and breath sounds normal. No respiratory distress. She has no wheezes. She has no rales. She exhibits no tenderness.   Musculoskeletal: Trace pedal edema. Neurovascularly intact distal LE b/l.   Neurological: She is alert and oriented to person, place, and time.   Skin: Skin is warm. She is not diaphoretic.   Psychiatric: She has a normal mood and affect. Her behavior is normal. Judgment and thought content normal.   Nursing note and vitals reviewed.    Significant Labs:   CBC:   No results for input(s): WBC, HGB, HCT, PLT in the last 48 hours.  CMP:   Recent Labs   Lab 10/26/19  0533 10/27/19  0501   * 129*   K 4.8 5.4*   CL 96 94*   CO2 23 23   * 236*   BUN 38* 55*   CREATININE 5.2* 6.6*   CALCIUM 7.9* 7.8*   ANIONGAP 13 12    EGFRNONAA 7.2* 5.4*       Imaging reviewed:  Ct pelvis:  Impression       Acute nondisplaced fracture of the inferior right pubic ramus with questionable nondisplaced fracture of the anterior column of the right acetabulum.    Colonic diverticulosis    Diffuse anasarca             Assessment/Plan:      * Intractable pain  Pain control low dose norco, well controlled        Weakness    Discontinued blood pressure medications that can contribute to patient's weakness  Decreased insulin regimen due to hypoglycemia that contribute to patient's weakness.    HbA1c 6.2. With patient's age, would have goal HbA1c 8 to reduce risk of hypoglycemia.    Closed fracture of right inferior pubic ramus  Conservative, nonoperative mgmt as per orthopedics   SCDs for DVT prophylaxis   Progressive mobility protocol   CT and XRs completed  Continue PT/OT        ESRD (end stage renal disease)  Consulted Dr. Wolf for HD management  Scheduled for HD MWF  Renal diet   Continue phosphate binders      Type 2 diabetes mellitus with kidney complication, with long-term current use of insulin  Accuchecks ACHS with low dose ISS in addition to her scheduled insulin        VTE Risk Mitigation (From admission, onward)         Ordered     Place sequential compression device  Until discontinued      10/23/19 1509     IP VTE HIGH RISK PATIENT  Once      10/23/19 1509                      Conner Nunez MD  Department of Hospital Medicine   [facility]

## 2019-10-27 NOTE — SUBJECTIVE & OBJECTIVE
Review of Systems   Constitutional: Negative for chills, fatigue, fever and unexpected weight change.   HENT: Negative for sore throat.    Eyes: Negative for visual disturbance.   Respiratory: Negative for cough, chest tightness and shortness of breath.    Cardiovascular: Negative for chest pain.   Gastrointestinal: Negative for abdominal pain, constipation, diarrhea, nausea and vomiting.   Endocrine: Negative for polyuria.   Genitourinary: Negative for dysuria and hematuria.   Neurological: Negative for headaches.     Objective:     Vital Signs (Most Recent):  Temp: 97.8 °F (36.6 °C) (10/27/19 1234)  Pulse: 61 (10/27/19 1234)  Resp: 18 (10/27/19 1234)  BP: (!) 164/69 (10/27/19 1234)  SpO2: (!) 94 % (10/27/19 1234) Vital Signs (24h Range):  Temp:  [97.4 °F (36.3 °C)-98.2 °F (36.8 °C)] 97.8 °F (36.6 °C)  Pulse:  [61-71] 61  Resp:  [18-19] 18  SpO2:  [93 %-97 %] 94 %  BP: (110-164)/(50-81) 164/69     Weight: 81.6 kg (179 lb 14.3 oz)  Body mass index is 29.04 kg/m².    Intake/Output Summary (Last 24 hours) at 10/27/2019 1555  Last data filed at 10/27/2019 1200  Gross per 24 hour   Intake 720 ml   Output --   Net 720 ml        Constitutional: She is oriented to person, place, and time.No distress.   Frail appearing   HENT:   Right Ear: External ear normal.   Left Ear: External ear normal.   Eyes: Conjunctivae and EOM are normal. Right eye exhibits no discharge. Left eye exhibits no discharge.   Neck: Normal range of motion.   Cardiovascular: Normal rate and regular rhythm. Exam reveals no gallop and no friction rub.   Murmur heard.  Pulmonary/Chest: Effort normal and breath sounds normal. No respiratory distress. She has no wheezes. She has no rales. She exhibits no tenderness.   Musculoskeletal: Trace pedal edema. Neurovascularly intact distal LE b/l.   Neurological: She is alert and oriented to person, place, and time.   Skin: Skin is warm. She is not diaphoretic.   Psychiatric: She has a normal mood and affect. Her  behavior is normal. Judgment and thought content normal.   Nursing note and vitals reviewed.    Significant Labs:   CBC:   No results for input(s): WBC, HGB, HCT, PLT in the last 48 hours.  CMP:   Recent Labs   Lab 10/26/19  0533 10/27/19  0501   * 129*   K 4.8 5.4*   CL 96 94*   CO2 23 23   * 236*   BUN 38* 55*   CREATININE 5.2* 6.6*   CALCIUM 7.9* 7.8*   ANIONGAP 13 12   EGFRNONAA 7.2* 5.4*       Imaging reviewed:  Ct pelvis:  Impression       Acute nondisplaced fracture of the inferior right pubic ramus with questionable nondisplaced fracture of the anterior column of the right acetabulum.    Colonic diverticulosis    Diffuse anasarca

## 2019-10-28 LAB
ANION GAP SERPL CALC-SCNC: 15 MMOL/L (ref 8–16)
BUN SERPL-MCNC: 74 MG/DL (ref 8–23)
CALCIUM SERPL-MCNC: 7.7 MG/DL (ref 8.7–10.5)
CHLORIDE SERPL-SCNC: 90 MMOL/L (ref 95–110)
CO2 SERPL-SCNC: 21 MMOL/L (ref 23–29)
CREAT SERPL-MCNC: 7.6 MG/DL (ref 0.5–1.4)
EST. GFR  (AFRICAN AMERICAN): 5.3 ML/MIN/1.73 M^2
EST. GFR  (NON AFRICAN AMERICAN): 4.6 ML/MIN/1.73 M^2
GLUCOSE SERPL-MCNC: 106 MG/DL (ref 70–110)
GLUCOSE SERPL-MCNC: 136 MG/DL (ref 70–110)
GLUCOSE SERPL-MCNC: 156 MG/DL (ref 70–110)
GLUCOSE SERPL-MCNC: 158 MG/DL (ref 70–110)
GLUCOSE SERPL-MCNC: 222 MG/DL (ref 70–110)
GLUCOSE SERPL-MCNC: 225 MG/DL (ref 70–110)
GLUCOSE SERPL-MCNC: 97 MG/DL (ref 70–110)
MAGNESIUM SERPL-MCNC: 2.1 MG/DL (ref 1.6–2.6)
PHOSPHATE SERPL-MCNC: 8 MG/DL (ref 2.7–4.5)
POTASSIUM SERPL-SCNC: 5.3 MMOL/L (ref 3.5–5.1)
SODIUM SERPL-SCNC: 126 MMOL/L (ref 136–145)

## 2019-10-28 PROCEDURE — 97110 THERAPEUTIC EXERCISES: CPT

## 2019-10-28 PROCEDURE — 80048 BASIC METABOLIC PNL TOTAL CA: CPT

## 2019-10-28 PROCEDURE — 36415 COLL VENOUS BLD VENIPUNCTURE: CPT

## 2019-10-28 PROCEDURE — 84100 ASSAY OF PHOSPHORUS: CPT

## 2019-10-28 PROCEDURE — 97530 THERAPEUTIC ACTIVITIES: CPT

## 2019-10-28 PROCEDURE — 83735 ASSAY OF MAGNESIUM: CPT

## 2019-10-28 PROCEDURE — 96372 THER/PROPH/DIAG INJ SC/IM: CPT | Mod: 59

## 2019-10-28 PROCEDURE — 25000003 PHARM REV CODE 250: Performed by: NURSE PRACTITIONER

## 2019-10-28 PROCEDURE — G0378 HOSPITAL OBSERVATION PER HR: HCPCS

## 2019-10-28 PROCEDURE — 90935 HEMODIALYSIS ONE EVALUATION: CPT

## 2019-10-28 PROCEDURE — 63600175 PHARM REV CODE 636 W HCPCS: Mod: GZ | Performed by: FAMILY MEDICINE

## 2019-10-28 RX ADMIN — GABAPENTIN 100 MG: 100 CAPSULE ORAL at 08:10

## 2019-10-28 RX ADMIN — MUPIROCIN: 20 OINTMENT TOPICAL at 08:10

## 2019-10-28 RX ADMIN — GABAPENTIN 100 MG: 100 CAPSULE ORAL at 11:10

## 2019-10-28 RX ADMIN — INSULIN ASPART 12 UNITS: 100 INJECTION, SUSPENSION SUBCUTANEOUS at 05:10

## 2019-10-28 RX ADMIN — RENAGEL 800 MG: 800 TABLET ORAL at 05:10

## 2019-10-28 RX ADMIN — FAMOTIDINE 20 MG: 20 TABLET ORAL at 08:10

## 2019-10-28 RX ADMIN — GABAPENTIN 100 MG: 100 CAPSULE ORAL at 05:10

## 2019-10-28 RX ADMIN — HYDROCODONE BITARTRATE AND ACETAMINOPHEN 1 TABLET: 5; 325 TABLET ORAL at 11:10

## 2019-10-28 RX ADMIN — PRAVASTATIN SODIUM 40 MG: 40 TABLET ORAL at 08:10

## 2019-10-28 RX ADMIN — SENNOSIDES AND DOCUSATE SODIUM 1 TABLET: 8.6; 5 TABLET ORAL at 08:10

## 2019-10-28 RX ADMIN — INSULIN ASPART 20 UNITS: 100 INJECTION, SUSPENSION SUBCUTANEOUS at 08:10

## 2019-10-28 RX ADMIN — RENAGEL 1600 MG: 800 TABLET ORAL at 08:10

## 2019-10-28 RX ADMIN — FUROSEMIDE 20 MG: 20 TABLET ORAL at 05:10

## 2019-10-28 RX ADMIN — PANTOPRAZOLE SODIUM 40 MG: 40 TABLET, DELAYED RELEASE ORAL at 07:10

## 2019-10-28 RX ADMIN — INSULIN ASPART 1 UNITS: 100 INJECTION, SOLUTION INTRAVENOUS; SUBCUTANEOUS at 08:10

## 2019-10-28 RX ADMIN — LABETALOL HYDROCHLORIDE 200 MG: 200 TABLET, FILM COATED ORAL at 08:10

## 2019-10-28 NOTE — PT/OT/SLP PROGRESS
Physical Therapy Treatment    Patient Name:  Brandi Camacho   MRN:  [id number]    Recommendations:     Discharge Recommendations:  nursing facility, skilled   Discharge Equipment Recommendations: none   Barriers to discharge: decreased functional mobility    Assessment:     Brandi Camacho is a 79 y.o. female admitted with a medical diagnosis of Intractable pain.  She presents with the following impairments/functional limitations:  weakness, gait instability, impaired endurance, impaired balance, decreased safety awareness, impaired self care skills, orthopedic precautions, impaired functional mobilty. Pt had just returned from dialysis and finished lunch. She requires totalAx2 for supine <> sit. She performed 20 reps of LE therex sitting EOB.  Appears to have LLE intention tremors. Discussed with RN Shira the confusion with RLE protected WB per Dr. Campos. She notes she will contact him to clarify.     Rehab Prognosis: Fair; patient would benefit from acute skilled PT services to address these deficits and reach maximum level of function.    Recent Surgery: * No surgery found *      Plan:     During this hospitalization, patient to be seen daily to address the identified rehab impairments via gait training, therapeutic activities, therapeutic exercises and progress toward the following goals:    · Plan of Care Expires:  11/25/19    Subjective     Chief Complaint: RLE pain  Patient/Family Comments/goals: decrease pain  Pain/Comfort:  ·        Objective:     Communicated with RN prior to session.  Patient found supine with peripheral IV upon PT entry to room.     General Precautions: Standard, fall   Orthopedic Precautions:(RLE protected WB per MD)   Braces:       Functional Mobility:  · Bed Mobility:     · Supine to Sit: total assistance and of 2 persons  · Balance: sitting: poor      AM-PAC 6 CLICK MOBILITY          Therapeutic Activities and Exercises:   Therex: AP, LAQ, hip abd, shoulder flexion x20    Educated on  importance of OOB activity, safe transfers    Patient left supine with call button in reach, bed alarm on, RN notified and  present..    GOALS:   Multidisciplinary Problems     Physical Therapy Goals        Problem: Physical Therapy Goal    Goal Priority Disciplines Outcome Goal Variances Interventions   Physical Therapy Goal     PT, PT/OT Ongoing, Progressing     Description:  Goals to be met by: discharge     Patient will increase functional independence with mobility by performing: initiated 10/26/19      . Supine to sit with MInimal Assistance   . Sit to supine with MInimal Assistance   . Sit to stand transfer with Minimal Assistance   . Bed to chair transfer with Minimal Assistance using Rolling Walker   . Gait  x 25 ft feet with Minimal Assistance using Rolling Walker.                      Time Tracking:     PT Received On: 10/28/19  PT Start Time: 0230     PT Stop Time: 0255  PT Total Time (min): 25 min     Billable Minutes: Therapeutic Activity 15 and Therapeutic Exercise 10    Treatment Type: Treatment  PT/PTA: PT     PTA Visit Number: 0     Rachel Elkins, PT  10/28/2019

## 2019-10-28 NOTE — PROGRESS NOTES
Progress Note  Nephrology    Admit Date: 10/23/2019   LOS: 0 days     SUBJECTIVE:     CC:  ESRD, dependent on dialysis    Past medical, surgical and social history reviewed    HPI: 79F with breast cancer, DM2, GERD, HTN, ESRD on HD MWF, anemia, presents after a fall at home with pains and aches. She missed HD today. Imaging shows acute nondisplaced fracture of the inferior right pubic ramus with questionable nondisplaced fracture of the anterior column of the right acetabulum. Renal consulted for co-management.     10/24  Seen today while on dialysis.  Tolerating treatment well, no new complaints.  Missed her treatment yesterday, normally dialyzes on MWF.  Order placed for MWF HD.  10/25  Seen today on dialysis.  NAD noted, no new complaint of any kind.  VSS, K+ 5.1 this am.  10/26 VSS, no new complains, HD on Monday or PRN.  10/27 VSS, no new complains, HD on Monday or PRN. K was high, will give Kayaxelate today.  10/28  Seen today on dialysis.  A little more hyponatremic, K+ 5.3.  These should improve with dialysis.  Tolerating treatment well, no chest pain or SOB.    Review of Systems:  Constitutional: no fever or chills  Respiratory: no cough or shortness of breath  Cardiovascular: no chest pain or palpitations  Gastrointestinal: no nausea or vomiting, no abdominal pain or change in bowel habits  Musculoskeletal: hip/back pain  Neurological: no seizures or tremors    OBJECTIVE:     Vital Signs (Most Recent)  Temp: 98 °F (36.7 °C) (10/28/19 0701)  Pulse: 68 (10/28/19 0701)  Resp: 16 (10/28/19 0701)  BP: (!) 122/55 (10/28/19 0701)  SpO2: (!) 93 % (10/28/19 0701)    Vital Signs Range (Last 24H):  Temp:  [97.6 °F (36.4 °C)-98.7 °F (37.1 °C)]   Pulse:  [61-72]   Resp:  [16-19]   BP: (113-179)/(55-78)   SpO2:  [93 %-97 %]     Temp (24hrs), Av.1 °F (36.7 °C), Min:97.6 °F (36.4 °C), Max:98.7 °F (37.1 °C)    Systolic (24hrs), Av , Min:113 , Max:179     Diastolic (24hrs), Av, Min:55, Max:78      I & O (Last  24H):    Intake/Output Summary (Last 24 hours) at 10/28/2019 1038  Last data filed at 10/27/2019 2000  Gross per 24 hour   Intake 720 ml   Output --   Net 720 ml     Physical Exam:  General appearance: well developed, well nourished  Eyes:  conjunctivae/corneas clear. PERRL.  Neck: supple, symmetrical, trachea midline, no JVD  Lungs:  clear to auscultation bilaterally and normal respiratory effort  Heart: regular rate and rhythm, S1, S2 normal, no murmur, click, rub or gallop  Abdomen: soft, non-tender non-distented; bowel sounds normal; no masses,  no organomegaly  Extremities: no cyanosis or edema, or clubbing  Skin: Skin color, texture, turgor normal. No rashes or lesions  Neurologic: Normal strength and tone. No focal numbness or weakness    Laboratory:  CBC:  No results for input(s): WBC, RBC, HGB, HCT, PLT, MCV, MCH, MCHC in the last 24 hours.  BMP:  Recent Labs   Lab 10/28/19  0625   *   K 5.3*   CL 90*   CO2 21*   BUN 74*   CREATININE 7.6*   CALCIUM 7.7*      CMP:   Recent Labs   Lab 10/28/19  0625   *   CALCIUM 7.7*   *   K 5.3*   CO2 21*   CL 90*   BUN 74*   CREATININE 7.6*     All other lab data reviewed and negative unless otherwise specified   Diagnostic Results:  Labs: Reviewed    ASSESSMENT/PLAN:     Active Hospital Problems    Diagnosis  POA    *Intractable pain [R52]  Yes    Weakness [R53.1]  Yes    ESRD (end stage renal disease) [N18.6]  Yes     Chronic    Closed fracture of right inferior pubic ramus [S32.591A]  Yes    Type 2 diabetes mellitus with kidney complication, with long-term current use of insulin [E11.29, Z79.4]  Not Applicable     Chronic      Resolved Hospital Problems   No resolved problems to display.

## 2019-10-28 NOTE — PLAN OF CARE
Problem: Physical Therapy Goal  Goal: Physical Therapy Goal  Description  Goals to be met by: discharge     Patient will increase functional independence with mobility by performing: initiated 10/26/19      . Supine to sit with MInimal Assistance   . Sit to supine with MInimal Assistance   . Sit to stand transfer with Minimal Assistance   . Bed to chair transfer with Minimal Assistance using Rolling Walker   . Gait  x 25 ft feet with Minimal Assistance using Rolling Walker.     Outcome: Ongoing, Progressing    Continue skilled PT to allow progression towards established PT goals.

## 2019-10-28 NOTE — SUBJECTIVE & OBJECTIVE
Review of Systems   Constitutional: Negative for chills, fatigue, fever and unexpected weight change.   HENT: Negative for sore throat.    Eyes: Negative for visual disturbance.   Respiratory: Negative for cough, chest tightness and shortness of breath.    Cardiovascular: Negative for chest pain.   Gastrointestinal: Negative for abdominal pain, constipation, diarrhea, nausea and vomiting.   Endocrine: Negative for polyuria.   Genitourinary: Negative for dysuria and hematuria.   Neurological: Negative for headaches.     Objective:     Vital Signs (Most Recent):  Temp: 98.2 °F (36.8 °C) (10/28/19 1714)  Pulse: 77 (10/28/19 1714)  Resp: 18 (10/28/19 1714)  BP: (!) 119/59 (10/28/19 1714)  SpO2: (!) 93 % (10/28/19 1714) Vital Signs (24h Range):  Temp:  [97.6 °F (36.4 °C)-98.7 °F (37.1 °C)] 98.2 °F (36.8 °C)  Pulse:  [64-77] 77  Resp:  [16-19] 18  SpO2:  [93 %-96 %] 93 %  BP: ()/(48-78) 119/59     Weight: 81.6 kg (179 lb 14.3 oz)  Body mass index is 29.04 kg/m².    Intake/Output Summary (Last 24 hours) at 10/28/2019 1816  Last data filed at 10/28/2019 1300  Gross per 24 hour   Intake 740 ml   Output 1500 ml   Net -760 ml        Constitutional: She is oriented to person, place, and time.No distress.   Frail appearing   HENT:   Right Ear: External ear normal.   Left Ear: External ear normal.   Eyes: Conjunctivae and EOM are normal. Right eye exhibits no discharge. Left eye exhibits no discharge.   Neck: Normal range of motion.   Cardiovascular: Normal rate and regular rhythm. Exam reveals no gallop and no friction rub.   Murmur heard.  Pulmonary/Chest: Effort normal and breath sounds normal. No respiratory distress. She has no wheezes. She has no rales. She exhibits no tenderness.   Musculoskeletal: Trace pedal edema. Neurovascularly intact distal LE b/l.   Neurological: She is alert and oriented to person, place, and time.   Skin: Skin is warm. She is not diaphoretic.   Psychiatric: She has a normal mood and  affect. Her behavior is normal. Judgment and thought content normal.   Nursing note and vitals reviewed.    Significant Labs:   CBC:   No results for input(s): WBC, HGB, HCT, PLT in the last 48 hours.  CMP:   Recent Labs   Lab 10/27/19  0501 10/28/19  0625   * 126*   K 5.4* 5.3*   CL 94* 90*   CO2 23 21*   * 136*   BUN 55* 74*   CREATININE 6.6* 7.6*   CALCIUM 7.8* 7.7*   ANIONGAP 12 15   EGFRNONAA 5.4* 4.6*       Imaging reviewed:  Ct pelvis:  Impression       Acute nondisplaced fracture of the inferior right pubic ramus with questionable nondisplaced fracture of the anterior column of the right acetabulum.    Colonic diverticulosis    Diffuse anasarca         Physical Exam

## 2019-10-28 NOTE — PROGRESS NOTES
"[facility] Medicine  Progress Note    Patient Name: Brandi Camacho  MRN: [id number]  Patient Class: OP- Observation   Admission Date: 10/23/2019  Length of Stay: 0 days  Attending Physician: Conner Nunez MD  Primary Care Provider: Greg Skinner Iv, MD        Subjective:     Principal Problem:Intractable pain        HPI:  Ms. Camacho presents today with complaints of back pain. It is severe. It is associated with a mechanical fall last night and weakness. She denies head trauma, LOC, fever, chills, N/V/D, or dizziness. She's currently AAOx4 and states she fell last night that her legs "just gave out". She's in significant pain when she tries to move. She has a history of ESRD on HD MWF, HTN, IDDM, GERD, arthritis, and osteopenia. She lives at home with her elderly .    Overview/Hospital Course:  Patient was found to have an acute nondisplaced fracture of the inferior right pubic ramus with questionable nondisplaced fracture of the anterior column of the right acetabulum on CT imaging.  Yesterday was awaiting orthopedics for clearance to work with physical therapy - Dr. Campos recommended protected weight-bearing on the right lower extremity.   Discontinued antihypertensive medications due to low/normal blood pressures and decreased home insulin with HbA1c 6.2.  10/26: pain is well controlled. Eating lunch in the bed. Waiting on PT eval for dispo.   10/27: C/o "lots of soreness." Says current pain regimen is working ok. O/w no complaints. Doesn't think she is strong enough to go home.   10/28: Seen on HD. Pain is mostly well controlled but she does have significant pain with movement still. No major issues overnight.       Review of Systems   Constitutional: Negative for chills, fatigue, fever and unexpected weight change.   HENT: Negative for sore throat.    Eyes: Negative for visual disturbance.   Respiratory: Negative for cough, chest tightness and shortness of breath.  "   Cardiovascular: Negative for chest pain.   Gastrointestinal: Negative for abdominal pain, constipation, diarrhea, nausea and vomiting.   Endocrine: Negative for polyuria.   Genitourinary: Negative for dysuria and hematuria.   Neurological: Negative for headaches.     Objective:     Vital Signs (Most Recent):  Temp: 98.2 °F (36.8 °C) (10/28/19 1714)  Pulse: 77 (10/28/19 1714)  Resp: 18 (10/28/19 1714)  BP: (!) 119/59 (10/28/19 1714)  SpO2: (!) 93 % (10/28/19 1714) Vital Signs (24h Range):  Temp:  [97.6 °F (36.4 °C)-98.7 °F (37.1 °C)] 98.2 °F (36.8 °C)  Pulse:  [64-77] 77  Resp:  [16-19] 18  SpO2:  [93 %-96 %] 93 %  BP: ()/(48-78) 119/59     Weight: 81.6 kg (179 lb 14.3 oz)  Body mass index is 29.04 kg/m².    Intake/Output Summary (Last 24 hours) at 10/28/2019 1816  Last data filed at 10/28/2019 1300  Gross per 24 hour   Intake 740 ml   Output 1500 ml   Net -760 ml        Constitutional: She is oriented to person, place, and time.No distress.   Frail appearing   HENT:   Right Ear: External ear normal.   Left Ear: External ear normal.   Eyes: Conjunctivae and EOM are normal. Right eye exhibits no discharge. Left eye exhibits no discharge.   Neck: Normal range of motion.   Cardiovascular: Normal rate and regular rhythm. Exam reveals no gallop and no friction rub.   Murmur heard.  Pulmonary/Chest: Effort normal and breath sounds normal. No respiratory distress. She has no wheezes. She has no rales. She exhibits no tenderness.   Musculoskeletal: Trace pedal edema. Neurovascularly intact distal LE b/l.   Neurological: She is alert and oriented to person, place, and time.   Skin: Skin is warm. She is not diaphoretic.   Psychiatric: She has a normal mood and affect. Her behavior is normal. Judgment and thought content normal.   Nursing note and vitals reviewed.    Significant Labs:   CBC:   No results for input(s): WBC, HGB, HCT, PLT in the last 48 hours.  CMP:   Recent Labs   Lab 10/27/19  0501 10/28/19  0679   NA  129* 126*   K 5.4* 5.3*   CL 94* 90*   CO2 23 21*   * 136*   BUN 55* 74*   CREATININE 6.6* 7.6*   CALCIUM 7.8* 7.7*   ANIONGAP 12 15   EGFRNONAA 5.4* 4.6*       Imaging reviewed:  Ct pelvis:  Impression       Acute nondisplaced fracture of the inferior right pubic ramus with questionable nondisplaced fracture of the anterior column of the right acetabulum.    Colonic diverticulosis    Diffuse anasarca         Physical Exam          Assessment/Plan:      * Intractable pain  Pain control low dose norco, well controlled        Weakness    Discontinued blood pressure medications that can contribute to patient's weakness  Decreased insulin regimen due to hypoglycemia that contribute to patient's weakness.    HbA1c 6.2. With patient's age, would have goal HbA1c 8 to reduce risk of hypoglycemia.    Closed fracture of right inferior pubic ramus  Conservative, nonoperative mgmt as per orthopedics   SCDs for DVT prophylaxis   Progressive mobility protocol   CT and XRs completed  Continue PT/OT        ESRD (end stage renal disease)  Consulted Dr. Wolf for HD management  Scheduled for HD MWF  Renal diet   Continue phosphate binders      Type 2 diabetes mellitus with kidney complication, with long-term current use of insulin  Accuchecks ACHS with low dose ISS in addition to her scheduled insulin        VTE Risk Mitigation (From admission, onward)         Ordered     Place sequential compression device  Until discontinued      10/23/19 1509     IP VTE HIGH RISK PATIENT  Once      10/23/19 1509                      Conner Nunez MD  Department of Hospital Medicine   [facility]

## 2019-10-29 VITALS
DIASTOLIC BLOOD PRESSURE: 83 MMHG | HEIGHT: 66 IN | WEIGHT: 179.88 LBS | OXYGEN SATURATION: 96 % | HEART RATE: 67 BPM | BODY MASS INDEX: 28.91 KG/M2 | SYSTOLIC BLOOD PRESSURE: 155 MMHG | TEMPERATURE: 98 F | RESPIRATION RATE: 18 BRPM

## 2019-10-29 PROBLEM — R52 INTRACTABLE PAIN: Status: RESOLVED | Noted: 2019-10-23 | Resolved: 2019-10-29

## 2019-10-29 LAB
GLUCOSE SERPL-MCNC: 123 MG/DL (ref 70–110)
GLUCOSE SERPL-MCNC: 157 MG/DL (ref 70–110)

## 2019-10-29 PROCEDURE — 63600175 PHARM REV CODE 636 W HCPCS: Performed by: FAMILY MEDICINE

## 2019-10-29 PROCEDURE — 96372 THER/PROPH/DIAG INJ SC/IM: CPT | Mod: 59

## 2019-10-29 PROCEDURE — 97530 THERAPEUTIC ACTIVITIES: CPT

## 2019-10-29 PROCEDURE — G0378 HOSPITAL OBSERVATION PER HR: HCPCS

## 2019-10-29 PROCEDURE — 82962 GLUCOSE BLOOD TEST: CPT

## 2019-10-29 PROCEDURE — 25000003 PHARM REV CODE 250: Mod: GZ | Performed by: NURSE PRACTITIONER

## 2019-10-29 RX ORDER — HYDROCODONE BITARTRATE AND ACETAMINOPHEN 5; 325 MG/1; MG/1
1 TABLET ORAL EVERY 8 HOURS PRN
Qty: 25 TABLET | Refills: 0 | Status: SHIPPED | OUTPATIENT
Start: 2019-10-29

## 2019-10-29 RX ADMIN — FUROSEMIDE 20 MG: 20 TABLET ORAL at 09:10

## 2019-10-29 RX ADMIN — RENAGEL 1600 MG: 800 TABLET ORAL at 07:10

## 2019-10-29 RX ADMIN — SENNOSIDES AND DOCUSATE SODIUM 1 TABLET: 8.6; 5 TABLET ORAL at 09:10

## 2019-10-29 RX ADMIN — PANTOPRAZOLE SODIUM 40 MG: 40 TABLET, DELAYED RELEASE ORAL at 06:10

## 2019-10-29 RX ADMIN — FAMOTIDINE 20 MG: 20 TABLET ORAL at 09:10

## 2019-10-29 RX ADMIN — LABETALOL HYDROCHLORIDE 200 MG: 200 TABLET, FILM COATED ORAL at 09:10

## 2019-10-29 RX ADMIN — GABAPENTIN 100 MG: 100 CAPSULE ORAL at 09:10

## 2019-10-29 RX ADMIN — INSULIN ASPART 20 UNITS: 100 INJECTION, SUSPENSION SUBCUTANEOUS at 07:10

## 2019-10-29 NOTE — PLAN OF CARE
10/29/19 1554   Post-Acute Status   Post-Acute Authorization Placement   Working on placement for patient. Patient does not qualify for SNF because of being in observation status. Talked to patient about going to [facility] in [place] but declined to go that far. Patient and  said would rather just go home with home health. Spoke with patient about Freedom of Choice Form, explained to patient and family that they have the right to choose any agency, and a list of agencies was provided to patient and family to review, they verbalized an understanding, pt signed form, and form scanned into CM notes.Sent referral to [facility]. Also Dr. Hagan ordered a hospital bed and bobbi lift. Sent those referrals to [employer] via fax to [phone]. Called them and will get equipment out there tomorrow.

## 2019-10-29 NOTE — PLAN OF CARE
10/29/19 1128   Discharge Assessment   Assessment Type Discharge Planning Assessment   Confirmed/corrected address and phone number on facesheet? Yes   Assessment information obtained from? Patient   Communicated expected length of stay with patient/caregiver yes   Prior to hospitilization cognitive status: Alert/Oriented   Prior to hospitalization functional status: Needs Assistance   Current cognitive status: Alert/Oriented   Current Functional Status: Assistive Equipment   Lives With spouse   Able to Return to Prior Arrangements yes   Is patient able to care for self after discharge? Unable to determine at this time (comments)   Patient's perception of discharge disposition home or selfcare   Patient currently being followed by outpatient case management? No   Patient currently receives any other outside agency services? No   Equipment Currently Used at Home walker, rolling;wheelchair;bedside commode   Do you have any problems affording any of your prescribed medications? No   Is the patient taking medications as prescribed? yes   Does the patient have transportation home? Yes   Transportation Anticipated family or friend will provide   Discharge Plan A Home   Discharge Plan B Home Health   DME Needed Upon Discharge  none   Patient/Family in Agreement with Plan yes

## 2019-10-29 NOTE — NURSING
Discussed with patient the option of rehab in [place] (which I was told would take her). She and her spouse refused...they want to go home. Explained my concern due to limited mobility and pain with mobility , etc but they are adamant to return home with HH.

## 2019-10-29 NOTE — PLAN OF CARE
10/29/19 1602   Final Note   Assessment Type Final Discharge Note   Anticipated Discharge Disposition Home-Health   Followed up with jean pierre and talked to a [facility] who said received the referral and can accept patient.

## 2019-10-29 NOTE — DISCHARGE SUMMARY
"[facility] Medicine  Discharge Summary      Patient Name: Brandi Camacho  MRN: [id number]  Admission Date: 10/23/2019  Hospital Length of Stay: 0 days  Discharge Date and Time:  10/29/2019 4:44 PM  Attending Physician: No att. providers found   Discharging Provider: Owen Hagan MD  Primary Care Provider: Greg Skinner Iv, MD      HPI:   Ms. Camacho presents today with complaints of back pain. It is severe. It is associated with a mechanical fall last night and weakness. She denies head trauma, LOC, fever, chills, N/V/D, or dizziness. She's currently AAOx4 and states she fell last night that her legs "just gave out". She's in significant pain when she tries to move. She has a history of ESRD on HD MWF, HTN, IDDM, GERD, arthritis, and osteopenia. She lives at home with her elderly .    * No surgery found *      Hospital Course:   Patient was found to have an acute nondisplaced fracture of the inferior right pubic ramus with questionable nondisplaced fracture of the anterior column of the right acetabulum on CT imaging.  Yesterday was awaiting orthopedics for clearance to work with physical therapy - Dr. Campos recommended protected weight-bearing on the right lower extremity. Discontinued antihypertensive medications due to low/normal blood pressures and decreased home insulin with HbA1c 6.2.  Physical therapy evaluated patient and recommended SNF placement however patient does not qualify.  Patient was stable at discharged to home with home health and physical therapy.    General: Patient resting comfortably in no acute distress. Appears as stated age. Calm. Frail appearing  Eyes: EOM intact. No conjunctivae injection. No scleral icterus.  ENT: Hearing grossly intact. No discharge from ears. No nasal discharge.   CVS: RRR. No LE edema BL.  Lungs: CTA BL, no wheezing or crackles. Good breath sounds. No accessory muscle use. No acute respiratory distress  Neuro: AOx3. GCS 15. Cranial " "nerves grossly intact. Moves all extremities equally. Follows commands. Responds appropriately      Consults:   Consults (From admission, onward)        Status Ordering Provider     Inpatient consult to Nephrology  Once     Provider:  (Not yet assigned)    Completed DANIELA HENSON JR          No new Assessment & Plan notes have been filed under this hospital service since the last note was generated.  Service: Hospital Medicine    Final Active Diagnoses:    Diagnosis Date Noted POA    Weakness [R53.1] 10/24/2019 Yes    ESRD (end stage renal disease) [N18.6] 10/23/2019 Yes     Chronic    Closed fracture of right inferior pubic ramus [S32.591A] 10/23/2019 Yes    Type 2 diabetes mellitus with kidney complication, with long-term current use of insulin [E11.29, Z79.4] 11/06/2015 Not Applicable     Chronic      Problems Resolved During this Admission:    Diagnosis Date Noted Date Resolved POA    PRINCIPAL PROBLEM:  Intractable pain [R52] 10/23/2019 10/29/2019 Yes       Discharged Condition: stable    Disposition: Home-Health Care [facility]    Follow Up:  Follow-up Information     Lavell Wolf MD.    Specialty:  Nephrology  Contact information:  [address] [place] [address] NEPHROLOGY [facility] [address]  [phone]             Greg Skinner Iv, MD In 1 week.    Specialty:  Family Medicine  Why:  For check up s/p hospital discharge  Contact information:  [address]   [place] [id number] [phone]             HOME HEALTH TO FOLLOW UP.               Patient Instructions:      HOSPITAL BED FOR HOME USE     Order Specific Question Answer Comments   Type: Manual    Length of need (1-99 months): 99    Does patient have medical equipment at home? walker, rolling    Does patient have medical equipment at home? wheelchair    Does patient have medical equipment at home? bedside commode    Height: 5' 6" (1.676 m)    Weight: 81.6 kg (179 lb 14.3 oz)    Please check all that apply: Patient requires " "positioning of the body in ways not feasible in an ordinary bed due to a medical condition which is expected to last at least one month.      PATIENT (KESHAV) LIFT FOR HOME USE     Order Specific Question Answer Comments   Height: 5' 6" (1.676 m)    Weight: 81.6 kg (179 lb 14.3 oz)    Does patient have medical equipment at home? walker, rolling    Does patient have medical equipment at home? wheelchair    Does patient have medical equipment at home? bedside commode    Length of need (1-99 months): 99      Ambulatory referral to Home Health   Referral Priority: Routine Referral Type: Home Health   Referral Reason: Specialty Services Required   Requested Specialty: Home Health Services   Number of Visits Requested: 1     Diet Cardiac     Notify your health care provider if you experience any of the following:  temperature >100.4     Notify your health care provider if you experience any of the following:  persistent nausea and vomiting or diarrhea     Notify your health care provider if you experience any of the following:  severe uncontrolled pain     Notify your health care provider if you experience any of the following:  redness, tenderness, or signs of infection (pain, swelling, redness, odor or green/yellow discharge around incision site)     Notify your health care provider if you experience any of the following:  difficulty breathing or increased cough     Notify your health care provider if you experience any of the following:  severe persistent headache     Notify your health care provider if you experience any of the following:  worsening rash     Notify your health care provider if you experience any of the following:  persistent dizziness, light-headedness, or visual disturbances     Notify your health care provider if you experience any of the following:  increased confusion or weakness     Activity as tolerated       Significant Diagnostic Studies:   CT pelvis without contrast  Impression:       Acute " nondisplaced fracture of the inferior right pubic ramus with questionable nondisplaced fracture of the anterior column of the right acetabulum.    Colonic diverticulosis    Diffuse anasarca       Pending Diagnostic Studies:     None         Medications:  Reconciled Home Medications:      Medication List      START taking these medications    HYDROcodone-acetaminophen 5-325 mg per tablet  Commonly known as:  NORCO  Take 1 tablet by mouth every 8 (eight) hours as needed for Pain.        CHANGE how you take these medications    * insulin NPH-insulin regular (70/30) 100 unit/mL (70-30) injection  Commonly known as:  NovoLIN 70/30 U-100 Insulin  Inject 20 Units into the skin once daily. AND 10 UNITS QHS  What changed:  how much to take     * insulin NPH-insulin regular (70/30) 100 unit/mL (70-30) injection  Commonly known as:  NOVOLIN 70/30  Inject 12 Units into the skin every evening. AND 30 UNITS QAM  What changed:  how much to take         * This list has 2 medication(s) that are the same as other medications prescribed for you. Read the directions carefully, and ask your doctor or other care provider to review them with you.            CONTINUE taking these medications    cetirizine 10 MG tablet  Commonly known as:  ZYRTEC  Take 10 mg by mouth once daily.     furosemide 20 MG tablet  Commonly known as:  LASIX  Take 20 mg by mouth 2 (two) times daily.     gabapentin 100 MG capsule  Commonly known as:  NEURONTIN  Take 100 mg by mouth 3 (three) times daily.     labetalol 200 MG tablet  Commonly known as:  NORMODYNE  Take 200 mg by mouth 2 (two) times daily.     omeprazole 40 MG capsule  Commonly known as:  PRILOSEC  Take 40 mg by mouth once daily.     ondansetron 8 MG Tbdl  Commonly known as:  ZOFRAN-ODT  Take 8 mg by mouth every 12 (twelve) hours as needed.     pravastatin 40 MG tablet  Commonly known as:  PRAVACHOL  Take 40 mg by mouth every evening.     promethazine 12.5 MG Tab  Commonly known as:  PHENERGAN  Take  12.5 mg by mouth every 8 (eight) hours as needed.     ranitidine 150 MG tablet  Commonly known as:  ZANTAC  Take 150 mg by mouth 2 (two) times daily.     TOM-ANA RX ORAL  Take 1 tablet by mouth once daily.     * sevelamer carbonate 800 mg Tab  Commonly known as:  RENVELA  Take 1,600 mg by mouth once daily. With DINNER and 800 mg BID with other meals     * sevelamer carbonate 800 mg Tab  Commonly known as:  RENVELA  Take 800 mg by mouth 2 (two) times daily. With meals, and 1600mg daily with the largest meal         * This list has 2 medication(s) that are the same as other medications prescribed for you. Read the directions carefully, and ask your doctor or other care provider to review them with you.            STOP taking these medications    losartan 25 MG tablet  Commonly known as:  COZAAR            Indwelling Lines/Drains at time of discharge:   Lines/Drains/Airways     Central Venous Catheter Line                 Hemodialysis Catheter 04/21/16 2020 1285 days          Drain                 Hemodialysis AV Fistula Right upper arm -- days         Hemodialysis AV Graft Right forearm -- days                Time spent on the discharge of patient: 25 minutes  Patient was seen and examined on the date of discharge and determined to be suitable for discharge.         Owen Hagan MD  Department of Hospital Medicine  [facility]  Date of service: 10/29/2019 4:45 PM

## 2019-10-29 NOTE — PT/OT/SLP PROGRESS
Physical Therapy Treatment    Patient Name:  Brandi Camacho   MRN:  [id number]    Recommendations:     Discharge Recommendations:  nursing facility, skilled   Discharge Equipment Recommendations: none   Barriers to discharge: Decreased caregiver support (Pt lives home with  noting Pt and  stated that sons live close and are willing to help.)     Assessment:     Brandi Camacho is a 79 y.o. female admitted with a medical diagnosis of Intractable pain.  She presents with the following impairments/functional limitations:  weakness, impaired endurance, impaired self care skills, impaired balance, gait instability, impaired functional mobilty, orthopedic precautions, decreased safety awareness. Pt requires assistance with all activity secondary to increased pain and difficulty moving BLE. Pt requires verbal cuing during transfer for walker management.  Continue with PT and POC.     Rehab Prognosis: Fair; patient would benefit from acute skilled PT services to address these deficits and reach maximum level of function.    Recent Surgery: * No surgery found *      Plan:     During this hospitalization, patient to be seen daily to address the identified rehab impairments via gait training, therapeutic activities, therapeutic exercises and progress toward the following goals:    · Plan of Care Expires:  11/25/19    Subjective     Chief Complaint: Pt reports increase in pain with all movement  Patient/Family Comments/goals: return home   Pain/Comfort:  · Location - Side 1: Right  · Location 1: hip      Objective:     Communicated with RN prior to session.  Patient found HOB elevated with peripheral IV upon PT entry to room.     General Precautions: Standard, fall   Orthopedic Precautions:(RLE protected WB per MD)   Braces:       Functional Mobility:  · Bed Mobility:     · Supine to Sit: total assistance and of 2 persons  · Transfers:     · Sit to Stand:  moderate assistance and of 2 persons with rolling  walker  · Bed to Chair: minimum assistance with  rolling walker  using  Step Transfer      AM-PAC 6 CLICK MOBILITY          Therapeutic Activities and Exercises:   bed mobility; sitting EOB for trunk control and midline orientation; sit<> stands; transfer training    Patient left up in chair with call button in reach and grand daughter and  present..    GOALS:   Multidisciplinary Problems     Physical Therapy Goals     Not on file          Multidisciplinary Problems (Resolved)        Problem: Physical Therapy Goal    Goal Priority Disciplines Outcome Goal Variances Interventions   Physical Therapy Goal   (Resolved)     PT, PT/OT Met     Description:  Goals to be met by: discharge     Patient will increase functional independence with mobility by performing: initiated 10/26/19      . Supine to sit with MInimal Assistance   . Sit to supine with MInimal Assistance   . Sit to stand transfer with Minimal Assistance   . Bed to chair transfer with Minimal Assistance using Rolling Walker   . Gait  x 25 ft feet with Minimal Assistance using Rolling Walker.                      Time Tracking:     PT Received On: 10/29/19  PT Start Time: 1108     PT Stop Time: 1134  PT Total Time (min): 26 min     Billable Minutes: Therapeutic Activity 26    Treatment Type: Treatment  PT/PTA: PTA     PTA Visit Number: 1     Hillary Hammant, PTA  10/29/2019

## 2019-10-31 ENCOUNTER — HOSPITAL ENCOUNTER (OUTPATIENT)
Facility: HOSPITAL | Age: 79
Discharge: NURSING, REHAB OR LONG-TERM CARE | End: 2019-11-01
Attending: EMERGENCY MEDICINE | Admitting: HOSPITALIST
Payer: MEDICARE

## 2019-10-31 DIAGNOSIS — R53.1 WEAKNESS: Primary | ICD-10-CM
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
## 2019-10-31 LAB
ANION GAP SERPL CALC-SCNC: 11 MMOL/L (ref 8–16)
BASOPHILS # BLD AUTO: 0.05 K/UL (ref 0–0.2)
BASOPHILS NFR BLD: 1 % (ref 0–1.9)
BUN SERPL-MCNC: 37 MG/DL (ref 8–23)
CALCIUM SERPL-MCNC: 8.2 MG/DL (ref 8.7–10.5)
CHLORIDE SERPL-SCNC: 100 MMOL/L (ref 95–110)
CO2 SERPL-SCNC: 25 MMOL/L (ref 23–29)
CREAT SERPL-MCNC: 5.1 MG/DL (ref 0.5–1.4)
DIFFERENTIAL METHOD: ABNORMAL
EOSINOPHIL # BLD AUTO: 0.2 K/UL (ref 0–0.5)
EOSINOPHIL NFR BLD: 4.5 % (ref 0–8)
ERYTHROCYTE [DISTWIDTH] IN BLOOD BY AUTOMATED COUNT: 14.6 % (ref 11.5–14.5)
EST. GFR  (AFRICAN AMERICAN): 9 ML/MIN/1.73 M^2
EST. GFR  (NON AFRICAN AMERICAN): 7 ML/MIN/1.73 M^2
GLUCOSE SERPL-MCNC: 102 MG/DL (ref 70–110)
HCT VFR BLD AUTO: 31.6 % (ref 37–48.5)
HGB BLD-MCNC: 10.1 G/DL (ref 12–16)
IMM GRANULOCYTES # BLD AUTO: 0.01 K/UL (ref 0–0.04)
INR PPP: 1 (ref 0.8–1.2)
LYMPHOCYTES # BLD AUTO: 1 K/UL (ref 1–4.8)
LYMPHOCYTES NFR BLD: 20.2 % (ref 18–48)
MCH RBC QN AUTO: 33 PG (ref 27–31)
MCHC RBC AUTO-ENTMCNC: 32 G/DL (ref 32–36)
MCV RBC AUTO: 103 FL (ref 82–98)
MONOCYTES # BLD AUTO: 0.8 K/UL (ref 0.3–1)
MONOCYTES NFR BLD: 15.8 % (ref 4–15)
NEUTROPHILS # BLD AUTO: 3 K/UL (ref 1.8–7.7)
NEUTROPHILS NFR BLD: 58.3 % (ref 38–73)
NRBC BLD-RTO: 0 /100 WBC
PLATELET # BLD AUTO: 158 K/UL (ref 150–350)
PLATELET BLD QL SMEAR: ABNORMAL
PMV BLD AUTO: 10.2 FL (ref 9.2–12.9)
POCT GLUCOSE: 141 MG/DL (ref 70–110)
POTASSIUM SERPL-SCNC: 4.6 MMOL/L (ref 3.5–5.1)
PROTHROMBIN TIME: 10.7 SEC (ref 9–12.5)
RBC # BLD AUTO: 3.06 M/UL (ref 4–5.4)
SODIUM SERPL-SCNC: 136 MMOL/L (ref 136–145)
WBC # BLD AUTO: 5.06 K/UL (ref 3.9–12.7)

## 2019-10-31 PROCEDURE — 63600175 PHARM REV CODE 636 W HCPCS: Performed by: HOSPITALIST

## 2019-10-31 PROCEDURE — G0378 HOSPITAL OBSERVATION PER HR: HCPCS

## 2019-10-31 PROCEDURE — 80048 BASIC METABOLIC PNL TOTAL CA: CPT

## 2019-10-31 PROCEDURE — 85025 COMPLETE CBC W/AUTO DIFF WBC: CPT

## 2019-10-31 PROCEDURE — 36415 COLL VENOUS BLD VENIPUNCTURE: CPT

## 2019-10-31 PROCEDURE — 25000003 PHARM REV CODE 250: Performed by: HOSPITALIST

## 2019-10-31 PROCEDURE — 85610 PROTHROMBIN TIME: CPT

## 2019-10-31 PROCEDURE — 96372 THER/PROPH/DIAG INJ SC/IM: CPT

## 2019-10-31 PROCEDURE — 99285 EMERGENCY DEPT VISIT HI MDM: CPT | Mod: 25

## 2019-10-31 RX ORDER — SEVELAMER CARBONATE 800 MG/1
1600 TABLET, FILM COATED ORAL
Status: DISCONTINUED | OUTPATIENT
Start: 2019-10-31 | End: 2019-11-01 | Stop reason: HOSPADM

## 2019-10-31 RX ORDER — INSULIN ASPART 100 [IU]/ML
30 INJECTION, SUSPENSION SUBCUTANEOUS
Status: DISCONTINUED | OUTPATIENT
Start: 2019-11-01 | End: 2019-11-01 | Stop reason: HOSPADM

## 2019-10-31 RX ORDER — HYDROCODONE BITARTRATE AND ACETAMINOPHEN 5; 325 MG/1; MG/1
1 TABLET ORAL EVERY 8 HOURS PRN
Status: DISCONTINUED | OUTPATIENT
Start: 2019-10-31 | End: 2019-11-01 | Stop reason: HOSPADM

## 2019-10-31 RX ORDER — SODIUM CHLORIDE 0.9 % (FLUSH) 0.9 %
10 SYRINGE (ML) INJECTION
Status: DISCONTINUED | OUTPATIENT
Start: 2019-10-31 | End: 2019-11-01 | Stop reason: HOSPADM

## 2019-10-31 RX ORDER — HEPARIN SODIUM 5000 [USP'U]/ML
5000 INJECTION, SOLUTION INTRAVENOUS; SUBCUTANEOUS EVERY 8 HOURS
Status: DISCONTINUED | OUTPATIENT
Start: 2019-10-31 | End: 2019-11-01 | Stop reason: HOSPADM

## 2019-10-31 RX ORDER — IBUPROFEN 200 MG
24 TABLET ORAL
Status: DISCONTINUED | OUTPATIENT
Start: 2019-10-31 | End: 2019-11-01 | Stop reason: HOSPADM

## 2019-10-31 RX ORDER — LABETALOL 200 MG/1
200 TABLET, FILM COATED ORAL 2 TIMES DAILY
Status: DISCONTINUED | OUTPATIENT
Start: 2019-10-31 | End: 2019-11-01 | Stop reason: HOSPADM

## 2019-10-31 RX ORDER — INSULIN ASPART 100 [IU]/ML
1-10 INJECTION, SOLUTION INTRAVENOUS; SUBCUTANEOUS
Status: DISCONTINUED | OUTPATIENT
Start: 2019-10-31 | End: 2019-11-01 | Stop reason: HOSPADM

## 2019-10-31 RX ORDER — INSULIN ASPART 100 [IU]/ML
12 INJECTION, SUSPENSION SUBCUTANEOUS NIGHTLY
Status: DISCONTINUED | OUTPATIENT
Start: 2019-10-31 | End: 2019-11-01 | Stop reason: HOSPADM

## 2019-10-31 RX ORDER — PRAVASTATIN SODIUM 40 MG/1
40 TABLET ORAL NIGHTLY
Status: DISCONTINUED | OUTPATIENT
Start: 2019-10-31 | End: 2019-11-01 | Stop reason: HOSPADM

## 2019-10-31 RX ORDER — GLUCAGON 1 MG
1 KIT INJECTION
Status: DISCONTINUED | OUTPATIENT
Start: 2019-10-31 | End: 2019-11-01 | Stop reason: HOSPADM

## 2019-10-31 RX ORDER — IBUPROFEN 200 MG
16 TABLET ORAL
Status: DISCONTINUED | OUTPATIENT
Start: 2019-10-31 | End: 2019-11-01 | Stop reason: HOSPADM

## 2019-10-31 RX ORDER — GABAPENTIN 100 MG/1
100 CAPSULE ORAL 2 TIMES DAILY
Status: DISCONTINUED | OUTPATIENT
Start: 2019-10-31 | End: 2019-11-01 | Stop reason: HOSPADM

## 2019-10-31 RX ORDER — ONDANSETRON 8 MG/1
8 TABLET, ORALLY DISINTEGRATING ORAL EVERY 8 HOURS PRN
Status: DISCONTINUED | OUTPATIENT
Start: 2019-10-31 | End: 2019-11-01 | Stop reason: HOSPADM

## 2019-10-31 RX ADMIN — INSULIN ASPART 12 UNITS: 100 INJECTION, SUSPENSION SUBCUTANEOUS at 09:10

## 2019-10-31 RX ADMIN — LABETALOL HYDROCHLORIDE 200 MG: 200 TABLET, FILM COATED ORAL at 09:10

## 2019-10-31 RX ADMIN — GABAPENTIN 100 MG: 100 CAPSULE ORAL at 09:10

## 2019-10-31 RX ADMIN — PRAVASTATIN SODIUM 40 MG: 40 TABLET ORAL at 09:10

## 2019-10-31 RX ADMIN — HEPARIN SODIUM 5000 UNITS: 5000 INJECTION, SOLUTION INTRAVENOUS; SUBCUTANEOUS at 09:10

## 2019-10-31 NOTE — ED PROVIDER NOTES
Encounter Date: 10/31/2019    SCRIBE #1 NOTE: Cristiana MERIDA and trent scribing for, and in the presence of, Foster Cummings MD.       History     Chief Complaint   Patient presents with    Fall     fell two days ago and was seen at [facility]. sent home. goes to dialysis MWF     Time seen by provider: 1:53 PM on 10/31/2019    Brandi Camacho is a 79 y.o. female with PMHx of DM, left breast cancer, arthritis, HTN, anemia, and renal disorder who presents to the ED s/p a fall occurring 1 day ago. The patient had a fall 8 days ago for which she was seen at [facility] ED. She had a CT scan done at that time that showed a nondisplaced fracture of the inferior right pubic ramus and a questionable fracture of the anterior column of the right acetabulum. She was admitted to the hospital at that time and discharged 2 days ago. The patient reports that it felt like her right leg got weak last night and she fell. She thinks she hit the back of her head in this fall. Her family also note that it seems like her right hip pain has worsened since the fall. There is also bruising on her right arm. The patient denies syncope or any other symptoms at this time. Patient's PSHx includes right AV fistula placement. No known drug allergies noted.    The history is provided by the patient and a relative.     Review of patient's allergies indicates:   Allergen Reactions    Metformin Rash and Nausea And Vomiting     Other reaction(s): Unknown    Bactrim [sulfamethoxazole-trimethoprim] Rash    Sulfamethoxazole Rash     Other reaction(s): Rash  Other reaction(s): Rash    Trimethoprim Rash     Other reaction(s): Rash  Other reaction(s): Rash     Past Medical History:   Diagnosis Date    Anemia     sees Dr. Loyola and Dr. Turner    Anticoagulant long-term use     aspirin    Arthritis     Cancer     left breast ca    Diabetes mellitus     Encounter for blood transfusion     GERD (gastroesophageal reflux disease)     Hypertension     sees  Dr. Farmer    Renal disorder     CRF on hemodialysis MWF    Urinary tract infection      Past Surgical History:   Procedure Laterality Date    AV FISTULA PLACEMENT Right     BREAST SURGERY  2011    left mastectomy [facility] dr begum     SECTION      x2    endoscopic precancerous adenomatous      [facility] dr colvin, ampulla of vater    HYSTERECTOMY      EDI    left knee patella FRACTURE      MODIFIED RADICAL MASTECTOMY W/ AXILLARY LYMPH NODE DISSECTION      right knee orthoscope       Family History   Problem Relation Age of Onset    Cancer Sister     Cancer Mother         pancreatic    Diabetes Mother     Hearing loss Mother     Cancer Father         bladder    Diabetes Father     Cancer Sister         half sister, breast     Social History     Tobacco Use    Smoking status: Never Smoker    Smokeless tobacco: Never Used   Substance Use Topics    Alcohol use: No    Drug use: No     Review of Systems   Constitutional: Negative for activity change, diaphoresis and fever.   HENT: Negative for ear pain, rhinorrhea, sore throat and trouble swallowing.    Eyes: Negative for pain and visual disturbance.   Respiratory: Negative for cough, shortness of breath and stridor.    Cardiovascular: Negative for chest pain.   Gastrointestinal: Negative for abdominal pain, blood in stool, diarrhea, nausea and vomiting.   Genitourinary: Negative for dysuria, hematuria, vaginal bleeding and vaginal discharge.   Musculoskeletal: Positive for arthralgias (right hip). Negative for gait problem.   Skin: Positive for color change (bruising). Negative for rash and wound.   Neurological: Positive for weakness. Negative for seizures, syncope and headaches.   Psychiatric/Behavioral: Negative for hallucinations and suicidal ideas.       Physical Exam     Initial Vitals [10/31/19 1351]   BP Pulse Resp Temp SpO2   136/64 63 16 97.2 °F (36.2 °C) 96 %      MAP       --         Physical Exam    Nursing note and vitals  reviewed.  Constitutional: She appears well-developed. She is not diaphoretic. No distress.   Elderly appearing.   HENT:   Head: Normocephalic and atraumatic.   Nose: Nose normal.   Eyes: EOM are normal. No scleral icterus.   Neck: Normal range of motion. Neck supple.   Cardiovascular: Normal rate, regular rhythm, normal heart sounds and intact distal pulses. Exam reveals no gallop and no friction rub.    No murmur heard.  Pulses:       Dorsalis pedis pulses are 2+ on the right side, and 2+ on the left side.   Pulmonary/Chest: Breath sounds normal. No stridor. No respiratory distress. She has no wheezes. She has no rhonchi. She has no rales.   Abdominal: Soft. Bowel sounds are normal. There is no tenderness.   Musculoskeletal: Normal range of motion.        Right hip: She exhibits tenderness.        Cervical back: She exhibits no tenderness.   Decreased ROM of the RLE secondary to pain. No cervical spine tenderness.    Neurological: She is alert and oriented to person, place, and time. No cranial nerve deficit.   Sensation intact to light touch.    Skin: Skin is warm and dry. Capillary refill takes less than 2 seconds. No rash noted.   Psychiatric: She has a normal mood and affect.         ED Course   Procedures  Labs Reviewed   CBC W/ AUTO DIFFERENTIAL - Abnormal; Notable for the following components:       Result Value    RBC 3.06 (*)     Hemoglobin 10.1 (*)     Hematocrit 31.6 (*)     Mean Corpuscular Volume 103 (*)     Mean Corpuscular Hemoglobin 33.0 (*)     RDW 14.6 (*)     Mono% 15.8 (*)     All other components within normal limits   BASIC METABOLIC PANEL - Abnormal; Notable for the following components:    BUN, Bld 37 (*)     Creatinine 5.1 (*)     Calcium 8.2 (*)     eGFR if  9 (*)     eGFR if non  7 (*)     All other components within normal limits   PROTIME-INR          Imaging Results          CT Pelvis Without Contrast (Final result)  Result time 10/31/19 16:13:44     Final result by Josef Lawson MD (10/31/19 16:13:44)                 Impression:      Nondisplaced fractures of the right inferior pubic ramus and right pubic root without significant change.  Diffuse osteopenia.      Electronically signed by: Josef Lawson MD  Date:    10/31/2019  Time:    16:13             Narrative:    EXAMINATION:  CT PELVIS WITHOUT CONTRAST    CLINICAL HISTORY:  Pelvic fracture, known or suspected;    TECHNIQUE:  Axial images through the pelvis were acquired without contrast with multiplanar reformatted images created.    COMPARISON:  10/23/2019    FINDINGS:  The bones are diffusely osteopenic.  There is a mildly displaced fracture of the right inferior pubic ramus which is without significant change.  There is also an essentially nondisplaced fracture of the right anterior acetabulum at the pubic root, which is also unchanged.  No new fracture is identified.    There is heavy calcification of the infrarenal aorta.  Generalized mural thickening of the urinary bladder is present.  There has been hysterectomy.  There is moderate diverticulosis coli.  Moderate generalized body wall edema is present.  A few small gallstones are present.                               X-Ray Hip 2 View Right (Final result)  Result time 10/31/19 14:43:14    Final result by Yuniel Ortiz Jr., MD (10/31/19 14:43:14)                 Impression:      No acute fracture seen.  Osteoporosis noted.  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  Atherosclerosis.      Electronically signed by: Yuniel Ortiz MD  Date:    10/31/2019  Time:    14:43             Narrative:    EXAMINATION:  XR HIP 2 VIEW RIGHT    CLINICAL HISTORY:  Unspecified fall, initial encounter    TECHNIQUE:  AP view of the pelvis and frog leg lateral view of the right hip were performed.    COMPARISON:  Pelvic x-ray of October 23, 2019.    FINDINGS:  There is diffuse osteoporosis.  At the right hip a fracture is not  seen.  No dislocation is noted.  A fracture in the rest of the bones of the pelvis or left hip are not seen.  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  Atherosclerotic calcification is noted in the aorta, pelvic vessels and femoral vessels.                               CT Head Without Contrast (Final result)  Result time 10/31/19 14:38:22    Final result by Bandar Purcell MD (10/31/19 14:38:22)                 Impression:      1. There is no acute abnormality.  There is volume loss and nonspecific white matter change.  There is no intracranial hemorrhage, mass effect or obvious acute edema or ischemia.  There is no acute skull fracture.  There is no definite change when compared to the prior study.  2. Atherosclerosis.      Electronically signed by: Bandar Purcell MD  Date:    10/31/2019  Time:    14:38             Narrative:    EXAMINATION:  CT HEAD WITHOUT CONTRAST    CLINICAL HISTORY:  Headache, post trauma;    TECHNIQUE:  Routine unenhanced axial images were obtained through the head.  Sagittal and coronal reformatted images were created.  The study is reviewed in bone and soft tissue windows.    COMPARISON:  Head CT dated 08/08/2017    FINDINGS:  Intracranial contents: There is no acute intracranial abnormality or definite change compared to the prior study.  There is generalized volume loss with mild nonspecific white matter change.  There is no hemorrhage or mass effect.  There is no obvious acute infarction.  There is no hydrocephalus or midline shift.  There is no abnormal extra-axial fluid collection.  The basilar cisterns are open.  The cerebellar tonsils are in normal position.  Sellar structures are normal.    Extracranial contents, calvarium, soft tissues: There are changes of hyperostosis frontalis interna.  There is no acute skull fracture.  There is moderate to marked atherosclerosis.  There is mucosal  thickening in the right sphenoid sinus.  Otherwise, the included paranasal sinuses and mastoid air cells are clear.                               CT Cervical Spine Without Contrast (Final result)  Result time 10/31/19 14:44:34    Final result by Bandar Purcell MD (10/31/19 14:44:34)                 Impression:      1. There is extensive multilevel degenerative disc and facet disease including some degree of disc space narrowing, disc osteophyte complex, uncovertebral spurring and facet joint arthropathy at multiple levels.  Also, there is trace degenerative listhesis at several levels but there is no acute fracture or traumatic subluxation.  There is some degree of spinal canal and foraminal stenosis related to these degenerative changes.  These findings are discussed in detail by level above.      Electronically signed by: Bandar Purcell MD  Date:    10/31/2019  Time:    14:44             Narrative:    EXAMINATION:  CT CERVICAL SPINE WITHOUT CONTRAST    CLINICAL HISTORY:  C-spine trauma, NEXUS/CCR positive, low risk;    TECHNIQUE:  Low dose axial images, sagittal and coronal reformations were preformed though the cervical spine.  Contrast was not administered.    COMPARISON:  None    FINDINGS:  Vertebral column: There is extensive degenerative change throughout the entire cervical spine.  There is no acute fracture.  Vertebral body height is preserved.  There is trace anterolisthesis of C3 on C4, C7 on T1 and T1 on T2 which is likely degenerative in etiology.  There is facet joint arthropathy at both of these levels.  There is marked disc space narrowing from the C3-4 through C6-7 levels where there is endplate sclerosis and osteophyte formation.  The odontoid process is intact.  The anterior and posterior arches of C1 are normal.  The craniovertebral junction is preserved.  There is extensive multilevel facet joint arthropathy but the facet joints maintain normal articulation.    Spinal canal, cord,  epidural space: The spinal canal is developmentally normal.  There is no abnormal epidural soft tissue mass or fluid collection.    Findings by level:    C1-2: Alignment is normal.  There is bilateral joint space narrowing.    C2-3: There is left facet joint arthropathy and uncovertebral spurring contributing to mild left foraminal stenosis.  There is a minimal disc bulge without significant spinal stenosis.    C3-4: There is moderate spinal stenosis with marked bilateral foraminal stenosis due to changes of uncovertebral spurring and facet joint arthropathy in addition to a broad disc osteophyte complex.    C4-5: There is marked disc space narrowing, bilateral uncovertebral spurring, right greater than left facet joint arthropathy with a moderate broad disc protrusion/osteophyte contributing to moderate spinal stenosis with severe left and moderate left foraminal stenosis.    C5-6: There is marked disc space narrowing, bilateral uncovertebral spurring with a moderate broad disc protrusion/osteophyte and bilateral facet joint arthropathy contributing to moderate spinal canal and bilateral foraminal stenosis.    C6-7: There is marked disc space narrowing.  There is bilateral uncovertebral spurring with a moderate broad disc protrusion/osteophyte eccentric to the right resulting in moderate spinal stenosis and severe bilateral foraminal stenosis.    C7-T1: There is trace anterolisthesis of C7 on T1.  There is marked right and moderate-to-marked left facet joint arthropathy.  There is no significant spinal stenosis.  There is at least moderate bilateral foraminal stenosis.    Soft tissues, other: The prevertebral soft tissues are grossly normal without obvious edema.  The airway is patent.  There is no apical pneumothorax.                                 Medical Decision Making:   History:   Old Medical Records: I decided to obtain old medical records.  Clinical Tests:   Radiological Study: Ordered and Reviewed             Scribe Attestation:   Scribe #1: I performed the above scribed service and the documentation accurately describes the services I performed. I attest to the accuracy of the note.      Attending Attestation:     Physician Attestation for Scribe:    I, Dr. Foster Cummings, personally performed the services described in this documentation.   All medical record entries made by the scribe were at my direction and in my presence.   I have reviewed the chart and agree that the record is accurate and complete.   Foster Cummings MD  1:19 AM 11/01/2019     DISCLAIMER: This note was prepared with [email or web address] Naturally [employer] voice recognition transcription software. Garbled syntax, mangled pronouns, and other bizarre constructions may be attributed to that software system.          ED Course as of Oct 31 1727   Thu Oct 31, 2019   1347 79-year-old female past medical history of ESRD on HD MWF, HTN, IDDM, GERD, arthritis, osteopenia and 1 week status post fall with inferior right pubic rami fracture and questionable nondisplaced fracture anterior column of the right acetabulum presents today with fall. Imaging confirms prior fractures but no new fracture. Given repeat falls and inability to walk will admit for PT/OT and placement.    [BD]   1455 Impression      1. There is no acute abnormality.  There is volume loss and nonspecific white matter change.  There is no intracranial hemorrhage, mass effect or obvious acute edema or ischemia.  There is no acute skull fracture.  There is no definite change when compared to the prior study.  2. Atherosclerosis.      Electronically signed by: Bandar Purcell MD  Date: 10/31/2019  Time: 14:38        [BD]   0491 Impression      1. There is extensive multilevel degenerative disc and facet disease including some degree of disc space narrowing, disc osteophyte complex, uncovertebral spurring and facet joint arthropathy at multiple levels.  Also, there is trace degenerative listhesis at  several levels but there is no acute fracture or traumatic subluxation.  There is some degree of spinal canal and foraminal stenosis related to these degenerative changes.  These findings are discussed in detail by level above.      Electronically signed by: Bandar Purcell MD  Date: 10/31/2019  Time: 14:44        [BD]   2619 Impression      No acute fracture seen.  Osteoporosis noted.  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  Atherosclerosis.      Electronically signed by: Yuniel Ortiz MD  Date: 10/31/2019  Time: 14:43        [BD]   1625 Impression      Nondisplaced fractures of the right inferior pubic ramus and right pubic root without significant change.  Diffuse osteopenia.      Electronically signed by: Josef Lawson MD  Date: 10/31/2019  Time: 16:13        [BD]      ED Course User Index  [BD] Foster Cummnigs MD     Clinical Impression:       ICD-10-CM ICD-9-CM   1. Fall W19.XXXA E888.9         Disposition:   Disposition: Placed in Observation                        Foster Cummings MD  11/01/19 0122

## 2019-10-31 NOTE — ASSESSMENT & PLAN NOTE
Continue patient's home insulin regimen.  Cover with sliding scale insulin.  Monitor Accu-Cheks.

## 2019-10-31 NOTE — PLAN OF CARE
Per Nanette with [facility] shireen patient is denied.    Mike with [facility] stated he will eval patient tomorrow.  Mike [phone]       10/31/19 1705   Post-Acute Status   Post-Acute Authorization Placement   Post-Acute Placement Status Pending Post-Acute Medical Review

## 2019-10-31 NOTE — PLAN OF CARE
10/31/19 5259   CHAVEZ Message   Medicare Outpatient and Observation Notification regarding financial responsibility Given to patient/caregiver;Explained to patient/caregiver;Signed/date by patient/caregiver   Date CHAVEZ was signed 10/31/19   Time CHAVEZ was signed 2092

## 2019-10-31 NOTE — H&P
[facility] Medicine  History & Physical    Patient Name: Brandi Camacho  MRN: [id number]  Admission Date: 10/31/2019  Attending Physician: Sonya Brown MD  Primary Care Provider: Greg Skinner Iv, MD         Patient information was obtained from patient, spouse/SO, relative(s), past medical records and ER records.     Subjective:     Principal Problem:Fall    Chief Complaint:   Chief Complaint   Patient presents with    Fall     fell two days ago and was seen at [facility]. sent home. goes to dialysis MWF        HPI: Brandi Camacho is a 79 y.o. female with PMHx of DMII, hypertension, and ESRD on Monday Wednesday Friday HD  who presents to the ED s/p multiple falls at home. The patient had a fall 8 days ago for which she was seen at [facility] ED.  CT scan at that time showed a nondisplaced fracture of the inferior right pubic ramus and a questionable fracture of the anterior column of the right acetabulum. She was placed in observation to the hospital at that time during which time PT and OT recommended skilled nursing facility placement , though this was unable to be arranged and patient was discharged home with home health.  Patient reports that she has continued falls since discharge and feels incredibly weak.  She is unable to lift either leg off the bed.  Case management worked to arrange admission to [facility] from the ED however this was unable to be arranged today so ER physician feels unsafe discharging patient and requests I observe her overnight for PT/OT eval and rehab placement.    ER labs reviewed and are at baseline for patient.    Past Medical History:   Diagnosis Date    Anemia     sees Dr. Loyola and Dr. Turner    Anticoagulant long-term use     aspirin    Arthritis     Cancer     left breast ca    Diabetes mellitus     Encounter for blood transfusion     GERD (gastroesophageal reflux disease)     Hypertension     sees Dr. Farmer    Renal disorder     CRF on  hemodialysis MWF    Urinary tract infection        Past Surgical History:   Procedure Laterality Date    AV FISTULA PLACEMENT Right     BREAST SURGERY  2011    left mastectomy [facility] dr begum     SECTION      x2    [facility]ancerous [facility]omat[facility], ampulla of vater    HYSTERECTOMY      EDI    left knee patella FRACTURE      MODIFIED RADICAL MASTECTOMY W/ AXILLARY LYMPH NODE DISSECTION      right knee orthoscope         Review of patient's allergies indicates:   Allergen Reactions    Metformin Rash and Nausea And Vomiting     Other reaction(s): Unknown    Bactrim [sulfamethoxazole-trimethoprim] Rash    Sulfamethoxazole Rash     Other reaction(s): Rash  Other reaction(s): Rash    Trimethoprim Rash     Other reaction(s): Rash  Other reaction(s): Rash       No current facility-administered medications on file prior to encounter.      Current Outpatient Medications on File Prior to Encounter   Medication Sig    cetirizine (ZYRTEC) 10 MG tablet Take 10 mg by mouth once daily.    furosemide (LASIX) 20 MG tablet Take 20 mg by mouth 2 (two) times daily.     gabapentin (NEURONTIN) 100 MG capsule Take 100 mg by mouth 2 (two) times daily.     HYDROcodone-acetaminophen (NORCO) 5-325 mg per tablet Take 1 tablet by mouth every 8 (eight) hours as needed for Pain. (Patient taking differently: Take 1 tablet by mouth every 8 (eight) hours as needed for Pain. Take for 8 days.)    insulin NPH-insulin regular, 70/30, (NOVOLIN 70/30 U-100 INSULIN) 100 unit/mL (70-30) injection Inject 20 Units into the skin once daily. AND 10 UNITS QHS (Patient taking differently: Inject 30 Units into the skin every morning. )    insulin NPH-insulin regular, 70/30, (NOVOLIN 70/30) 100 unit/mL (70-30) injection Inject 12 Units into the skin every evening. AND 30 UNITS QAM (Patient taking differently: Inject 10 Units into the skin every evening. )    labetalol (NORMODYNE) 200 MG tablet Take 200  mg by mouth 2 (two) times daily.    omeprazole (PRILOSEC) 40 MG capsule Take 40 mg by mouth once daily.    ondansetron (ZOFRAN-ODT) 4 MG TbDL Take 4 mg by mouth every 12 (twelve) hours as needed (nausea). Take for 15 days.    pravastatin (PRAVACHOL) 40 MG tablet Take 40 mg by mouth every evening.     promethazine (PHENERGAN) 12.5 MG Tab Take 12.5 mg by mouth every 8 (eight) hours as needed (nausea). Take for 10 days.    ranitidine (ZANTAC) 150 MG tablet Take 150 mg by mouth 2 (two) times daily.     sevelamer carbonate (RENVELA) 800 mg Tab Take 1,600 mg by mouth daily with dinner or evening meal.     sevelamer carbonate (RENVELA) 800 mg Tab Take 800 mg by mouth 2 (two) times daily with meals. 800 mg bid with smaller meals and 1600 mg qd with largest meal    vit B comp no.3/folic/C/biotin (TOM-ANA RX ORAL) Take 1 tablet by mouth once daily.     Family History     Problem Relation (Age of Onset)    Cancer Sister, Mother, Father, Sister    Diabetes Mother, Father    Hearing loss Mother        Tobacco Use    Smoking status: Never Smoker    Smokeless tobacco: Never Used   Substance and Sexual Activity    Alcohol use: No    Drug use: No    Sexual activity: Not on file     Review of Systems   Constitutional: Negative for chills and fever.   HENT: Negative for congestion and rhinorrhea.    Respiratory: Negative for cough, shortness of breath and wheezing.    Cardiovascular: Negative for chest pain, palpitations and leg swelling.   Gastrointestinal: Negative for abdominal distention, abdominal pain, nausea and vomiting.   Endocrine: Negative for cold intolerance and heat intolerance.   Musculoskeletal: Positive for arthralgias and gait problem. Negative for neck stiffness.   Skin: Negative for rash and wound.   Neurological: Positive for weakness. Negative for dizziness.   Psychiatric/Behavioral: Negative for agitation and confusion.     Objective:     Vital Signs (Most Recent):  Temp: 97.2 °F (36.2 °C)  (10/31/19 1351)  Pulse: 64 (10/31/19 1501)  Resp: 16 (10/31/19 1351)  BP: 137/64 (10/31/19 1501)  SpO2: 95 % (10/31/19 1501) Vital Signs (24h Range):  Temp:  [97.2 °F (36.2 °C)] 97.2 °F (36.2 °C)  Pulse:  [63-66] 64  Resp:  [16] 16  SpO2:  [95 %-96 %] 95 %  BP: (134-137)/(61-64) 137/64     Weight: 79.4 kg (175 lb)  Body mass index is 29.12 kg/m².    Physical Exam   Constitutional: She is oriented to person, place, and time. No distress.   HENT:   Head: Normocephalic and atraumatic.   Eyes: Pupils are equal, round, and reactive to light. EOM are normal.   Neck: Normal range of motion. Neck supple. No thyromegaly present.   Cardiovascular: Normal rate and regular rhythm.   No murmur heard.  Pulmonary/Chest: Effort normal and breath sounds normal. No respiratory distress. She has no wheezes. She has no rales.   Abdominal: Soft. Bowel sounds are normal. She exhibits no distension. There is no tenderness.   Musculoskeletal: Normal range of motion. She exhibits no edema.   AV fistula with good thrill   Neurological: She is alert and oriented to person, place, and time. No cranial nerve deficit.   Unable to lift either lower extremity off the bed   Skin: Skin is warm and dry. No rash noted. She is not diaphoretic.   Psychiatric: She has a normal mood and affect. Her behavior is normal.         CRANIAL NERVES     CN III, IV, VI   Pupils are equal, round, and reactive to light.  Extraocular motions are normal.        Significant Labs: All pertinent labs within the past 24 hours have been reviewed.    Significant Imaging: I have reviewed and interpreted all pertinent imaging results/findings within the past 24 hours.    Assessment/Plan:     * Fall  Will have PT and OT evaluate.  Case management consult for rehab referral.      Closed fracture of right inferior pubic ramus  Recent after fall.  Being observed for PT and OT and rehab placement      ESRD (end stage renal disease)  On Monday Wednesday Friday HD.  Consult   [facility] for inpatient HD needs.      Type 2 diabetes mellitus with kidney complication, with long-term current use of insulin  Continue patient's home insulin regimen.  Cover with sliding scale insulin.  Monitor Accu-Cheks.      Hypertension  Chronic/controlled.  Continue home medications       VTE prophylaxis with heparin 5 Q 8     Sonya Brown MD  Department of Hospital Medicine   [facility]

## 2019-10-31 NOTE — PLAN OF CARE
Sent referrals via [facility] to [facility] and [facility] [place] [facility].    Per Mike with [facility] he is unable to admit patient today, however he can go to pt's home tomorrow for an eval and admit from home.       10/31/19 1422   Post-Acute Status   Post-Acute Authorization Placement   Post-Acute Placement Status Referrals Sent

## 2019-10-31 NOTE — PLAN OF CARE
Left voicemail for Nanette at [facility].  [facility].       10/31/19 7646   Post-Acute Status   Post-Acute Authorization Placement

## 2019-10-31 NOTE — ED NOTES
Pt in room 8 for evaluation of falls.  Pt has continued right hip and lower extremity pain from fall on 10/23. Pt was admitted to [facility] for same and discharged on 10/29.  Pt has since fallen again.  Hit back of head, denies LOC.  Pt has fistula to right upper arm.  Pt has bruising to gilbert extremities - worse to right arm which she hit during the fall.  Pt denies chest pain or sob.  Abd large, soft and non-tender. Family at bedside.  Pt arrived via EMS.

## 2019-10-31 NOTE — HPI
Brandi Camacho is a 79 y.o. female with PMHx of DMII, hypertension, and ESRD on Monday Wednesday Friday HD  who presents to the ED s/p multiple falls at home. The patient had a fall 8 days ago for which she was seen at [facility] ED.  CT scan at that time showed a nondisplaced fracture of the inferior right pubic ramus and a questionable fracture of the anterior column of the right acetabulum. She was placed in observation to the hospital at that time during which time PT and OT recommended skilled nursing facility placement , though this was unable to be arranged and patient was discharged home with home health.  Patient reports that she has continued falls since discharge and feels incredibly weak.  She is unable to lift either leg off the bed.  Case management worked to arrange admission to [facility] from the ED however this was unable to be arranged today so ER physician feels unsafe discharging patient and requests I observe her overnight for PT/OT eval and rehab placement.    ER labs reviewed and are at baseline for patient.

## 2019-10-31 NOTE — SUBJECTIVE & OBJECTIVE
Past Medical History:   Diagnosis Date    Anemia     sees Dr. Loyola and Dr. Turner    Anticoagulant long-term use     aspirin    Arthritis     Cancer     left breast ca    Diabetes mellitus     Encounter for blood transfusion     GERD (gastroesophageal reflux disease)     Hypertension     sees Dr. Farmer    Renal disorder     CRF on hemodialysis MWF    Urinary tract infection        Past Surgical History:   Procedure Laterality Date    AV FISTULA PLACEMENT Right     BREAST SURGERY  2011    left mastectomy [facility] dr begum     SECTION      x2    endoscopic precancerous adenomatous      [facility] dr colvin, ampulla of vater    HYSTERECTOMY      EDI    left knee patella FRACTURE      MODIFIED RADICAL MASTECTOMY W/ AXILLARY LYMPH NODE DISSECTION      right knee orthoscope         Review of patient's allergies indicates:   Allergen Reactions    Metformin Rash and Nausea And Vomiting     Other reaction(s): Unknown    Bactrim [sulfamethoxazole-trimethoprim] Rash    Sulfamethoxazole Rash     Other reaction(s): Rash  Other reaction(s): Rash    Trimethoprim Rash     Other reaction(s): Rash  Other reaction(s): Rash       No current facility-administered medications on file prior to encounter.      Current Outpatient Medications on File Prior to Encounter   Medication Sig    cetirizine (ZYRTEC) 10 MG tablet Take 10 mg by mouth once daily.    furosemide (LASIX) 20 MG tablet Take 20 mg by mouth 2 (two) times daily.     gabapentin (NEURONTIN) 100 MG capsule Take 100 mg by mouth 2 (two) times daily.     HYDROcodone-acetaminophen (NORCO) 5-325 mg per tablet Take 1 tablet by mouth every 8 (eight) hours as needed for Pain. (Patient taking differently: Take 1 tablet by mouth every 8 (eight) hours as needed for Pain. Take for 8 days.)    insulin NPH-insulin regular, 70/30, (NOVOLIN 70/30 U-100 INSULIN) 100 unit/mL (70-30) injection Inject 20 Units into the skin once daily. AND 10 UNITS QHS  (Patient taking differently: Inject 30 Units into the skin every morning. )    insulin NPH-insulin regular, 70/30, (NOVOLIN 70/30) 100 unit/mL (70-30) injection Inject 12 Units into the skin every evening. AND 30 UNITS QAM (Patient taking differently: Inject 10 Units into the skin every evening. )    labetalol (NORMODYNE) 200 MG tablet Take 200 mg by mouth 2 (two) times daily.    omeprazole (PRILOSEC) 40 MG capsule Take 40 mg by mouth once daily.    ondansetron (ZOFRAN-ODT) 4 MG TbDL Take 4 mg by mouth every 12 (twelve) hours as needed (nausea). Take for 15 days.    pravastatin (PRAVACHOL) 40 MG tablet Take 40 mg by mouth every evening.     promethazine (PHENERGAN) 12.5 MG Tab Take 12.5 mg by mouth every 8 (eight) hours as needed (nausea). Take for 10 days.    ranitidine (ZANTAC) 150 MG tablet Take 150 mg by mouth 2 (two) times daily.     sevelamer carbonate (RENVELA) 800 mg Tab Take 1,600 mg by mouth daily with dinner or evening meal.     sevelamer carbonate (RENVELA) 800 mg Tab Take 800 mg by mouth 2 (two) times daily with meals. 800 mg bid with smaller meals and 1600 mg qd with largest meal    vit B comp no.3/folic/C/biotin (TOM-ANA RX ORAL) Take 1 tablet by mouth once daily.     Family History     Problem Relation (Age of Onset)    Cancer Sister, Mother, Father, Sister    Diabetes Mother, Father    Hearing loss Mother        Tobacco Use    Smoking status: Never Smoker    Smokeless tobacco: Never Used   Substance and Sexual Activity    Alcohol use: No    Drug use: No    Sexual activity: Not on file     Review of Systems   Constitutional: Negative for chills and fever.   HENT: Negative for congestion and rhinorrhea.    Respiratory: Negative for cough, shortness of breath and wheezing.    Cardiovascular: Negative for chest pain, palpitations and leg swelling.   Gastrointestinal: Negative for abdominal distention, abdominal pain, nausea and vomiting.   Endocrine: Negative for cold intolerance and  heat intolerance.   Musculoskeletal: Positive for arthralgias and gait problem. Negative for neck stiffness.   Skin: Negative for rash and wound.   Neurological: Positive for weakness. Negative for dizziness.   Psychiatric/Behavioral: Negative for agitation and confusion.     Objective:     Vital Signs (Most Recent):  Temp: 97.2 °F (36.2 °C) (10/31/19 1351)  Pulse: 64 (10/31/19 1501)  Resp: 16 (10/31/19 1351)  BP: 137/64 (10/31/19 1501)  SpO2: 95 % (10/31/19 1501) Vital Signs (24h Range):  Temp:  [97.2 °F (36.2 °C)] 97.2 °F (36.2 °C)  Pulse:  [63-66] 64  Resp:  [16] 16  SpO2:  [95 %-96 %] 95 %  BP: (134-137)/(61-64) 137/64     Weight: 79.4 kg (175 lb)  Body mass index is 29.12 kg/m².    Physical Exam   Constitutional: She is oriented to person, place, and time. No distress.   HENT:   Head: Normocephalic and atraumatic.   Eyes: Pupils are equal, round, and reactive to light. EOM are normal.   Neck: Normal range of motion. Neck supple. No thyromegaly present.   Cardiovascular: Normal rate and regular rhythm.   No murmur heard.  Pulmonary/Chest: Effort normal and breath sounds normal. No respiratory distress. She has no wheezes. She has no rales.   Abdominal: Soft. Bowel sounds are normal. She exhibits no distension. There is no tenderness.   Musculoskeletal: Normal range of motion. She exhibits no edema.   AV fistula with good thrill   Neurological: She is alert and oriented to person, place, and time. No cranial nerve deficit.   Unable to lift either lower extremity off the bed   Skin: Skin is warm and dry. No rash noted. She is not diaphoretic.   Psychiatric: She has a normal mood and affect. Her behavior is normal.         CRANIAL NERVES     CN III, IV, VI   Pupils are equal, round, and reactive to light.  Extraocular motions are normal.        Significant Labs: All pertinent labs within the past 24 hours have been reviewed.    Significant Imaging: I have reviewed and interpreted all pertinent imaging  results/findings within the past 24 hours.

## 2019-11-01 VITALS
BODY MASS INDEX: 32.14 KG/M2 | TEMPERATURE: 98 F | SYSTOLIC BLOOD PRESSURE: 128 MMHG | WEIGHT: 192.88 LBS | OXYGEN SATURATION: 95 % | HEART RATE: 68 BPM | DIASTOLIC BLOOD PRESSURE: 60 MMHG | RESPIRATION RATE: 17 BRPM | HEIGHT: 65 IN

## 2019-11-01 LAB
ANION GAP SERPL CALC-SCNC: 14 MMOL/L (ref 8–16)
BUN SERPL-MCNC: 45 MG/DL (ref 8–23)
CALCIUM SERPL-MCNC: 8.4 MG/DL (ref 8.7–10.5)
CHLORIDE SERPL-SCNC: 100 MMOL/L (ref 95–110)
CO2 SERPL-SCNC: 21 MMOL/L (ref 23–29)
CREAT SERPL-MCNC: 5.8 MG/DL (ref 0.5–1.4)
EST. GFR  (AFRICAN AMERICAN): 7 ML/MIN/1.73 M^2
EST. GFR  (NON AFRICAN AMERICAN): 6 ML/MIN/1.73 M^2
GLUCOSE SERPL-MCNC: 91 MG/DL (ref 70–110)
MAGNESIUM SERPL-MCNC: 2.2 MG/DL (ref 1.6–2.6)
PHOSPHATE SERPL-MCNC: 6.2 MG/DL (ref 2.7–4.5)
POCT GLUCOSE: 66 MG/DL (ref 70–110)
POTASSIUM SERPL-SCNC: 5 MMOL/L (ref 3.5–5.1)
SODIUM SERPL-SCNC: 135 MMOL/L (ref 136–145)

## 2019-11-01 PROCEDURE — 80100016 HC MAINTENANCE HEMODIALYSIS

## 2019-11-01 PROCEDURE — G0257 UNSCHED DIALYSIS ESRD PT HOS: HCPCS

## 2019-11-01 PROCEDURE — 83735 ASSAY OF MAGNESIUM: CPT

## 2019-11-01 PROCEDURE — 97530 THERAPEUTIC ACTIVITIES: CPT

## 2019-11-01 PROCEDURE — G0378 HOSPITAL OBSERVATION PER HR: HCPCS

## 2019-11-01 PROCEDURE — 84100 ASSAY OF PHOSPHORUS: CPT

## 2019-11-01 PROCEDURE — 36415 COLL VENOUS BLD VENIPUNCTURE: CPT

## 2019-11-01 PROCEDURE — 96372 THER/PROPH/DIAG INJ SC/IM: CPT | Mod: 59

## 2019-11-01 PROCEDURE — G8987 SELF CARE CURRENT STATUS: HCPCS | Mod: CL

## 2019-11-01 PROCEDURE — 25000003 PHARM REV CODE 250: Performed by: INTERNAL MEDICINE

## 2019-11-01 PROCEDURE — 63600175 PHARM REV CODE 636 W HCPCS: Performed by: HOSPITALIST

## 2019-11-01 PROCEDURE — G8988 SELF CARE GOAL STATUS: HCPCS | Mod: CK

## 2019-11-01 PROCEDURE — 97162 PT EVAL MOD COMPLEX 30 MIN: CPT

## 2019-11-01 PROCEDURE — 80048 BASIC METABOLIC PNL TOTAL CA: CPT

## 2019-11-01 PROCEDURE — 25000003 PHARM REV CODE 250: Performed by: HOSPITALIST

## 2019-11-01 RX ORDER — SEVELAMER CARBONATE 800 MG/1
800 TABLET, FILM COATED ORAL 2 TIMES DAILY WITH MEALS
Status: DISCONTINUED | OUTPATIENT
Start: 2019-11-01 | End: 2019-11-01 | Stop reason: HOSPADM

## 2019-11-01 RX ORDER — SODIUM CHLORIDE 9 MG/ML
INJECTION, SOLUTION INTRAVENOUS
Status: CANCELLED | OUTPATIENT
Start: 2019-11-01

## 2019-11-01 RX ORDER — SODIUM CHLORIDE 9 MG/ML
INJECTION, SOLUTION INTRAVENOUS ONCE
Status: CANCELLED | OUTPATIENT
Start: 2019-11-01 | End: 2019-11-01

## 2019-11-01 RX ORDER — MUPIROCIN 20 MG/G
OINTMENT TOPICAL 2 TIMES DAILY
Status: DISCONTINUED | OUTPATIENT
Start: 2019-11-01 | End: 2019-11-01 | Stop reason: HOSPADM

## 2019-11-01 RX ADMIN — INSULIN ASPART 30 UNITS: 100 INJECTION, SUSPENSION SUBCUTANEOUS at 10:11

## 2019-11-01 RX ADMIN — Medication 16 G: at 05:11

## 2019-11-01 RX ADMIN — MUPIROCIN: 20 OINTMENT TOPICAL at 10:11

## 2019-11-01 RX ADMIN — HEPARIN SODIUM 5000 UNITS: 5000 INJECTION, SOLUTION INTRAVENOUS; SUBCUTANEOUS at 05:11

## 2019-11-01 RX ADMIN — GABAPENTIN 100 MG: 100 CAPSULE ORAL at 10:11

## 2019-11-01 NOTE — PLAN OF CARE
11/01/19 1054   Post-Acute Status   Post-Acute Authorization Placement   Post-Acute Placement Status Awaiting Therapy Documentation   CM spoke to Rachel in therapy asking for evals to be completed as soon as possible, CM will send to Mike @ [facility] as soon as available. CM placed call to floor nurse, patient is going to HD right now. CM asked them to hold patient, CM trying to get evals done before HD. CM spoke to PT and eval has already been done, she will put note in now, OT will do eval now and HD nurse will start HD as soon as evals are finished.Chintan, floor nurse aware,  CM will follow. CM sent note via [facility] to [facility] [place] [facility] to update.

## 2019-11-01 NOTE — NURSING
HD complete. Pt tolerated hd well. Total UF 3 liters. No meds administered during HD. Report given to Chintan Ornelas RN.

## 2019-11-01 NOTE — PLAN OF CARE
CM met patient during HD, patient reports living with spouse,3 children have dual POA, patient also has living will. PCP is Dr. Skinner, pharmacy is [facility], patient has dme at home, see checkoff list below. Patient is aware that she will discharge to [facility] rehab today after HD aroung 500pm.      11/01/19 1310   Discharge Assessment   Assessment Type Discharge Planning Assessment   Confirmed/corrected address and phone number on facesheet? Yes   Assessment information obtained from? Patient   Communicated expected length of stay with patient/caregiver yes   Prior to hospitilization cognitive status: Alert/Oriented   Prior to hospitalization functional status: Completely Dependent   Current cognitive status: Alert/Oriented   Current Functional Status: Completely Dependent   Lives With spouse   Able to Return to Prior Arrangements no   Is patient able to care for self after discharge? Unable to determine at this time (comments)   Patient's perception of discharge disposition rehab facility   Readmission Within the Last 30 Days previous discharge plan unsuccessful   If yes, most recent facility name: [facility]   Patient currently being followed by outpatient case management? No   Patient currently receives any other outside agency services? No   Equipment Currently Used at Home wheelchair;walker, rolling;rollator;3-in-1 commode   Do you have any problems affording any of your prescribed medications? No   Is the patient taking medications as prescribed? yes   Does the patient have transportation home? Yes   Transportation Anticipated family or friend will provide   Does the patient receive services at the [facility]? No   Discharge Plan A Rehab   DME Needed Upon Discharge  none   Patient/Family in Agreement with Plan yes   Readmission Questionnaire   At the time of your discharge, did someone talk to you about what your health problems were? Yes   At the time of discharge, did someone talk to you about what to  watch out for regarding worsening of your health problem? Yes   At the time of discharge, did someone talk to you about what to do if you experienced worsening of your health problem? Yes   At the time of discharge, did someone talk to you about which medication to take when you left the hospital and which ones to stop taking? Yes   At the time of discharge, did someone talk to you about when and where to follow up with a doctor after you left the hospital? Yes   How often do you need to have someone help you when you read instructions, pamphlets, or other written material from your doctor or pharmacy? Sometimes   Do you have problems taking your medications as prescribed? Yes   Do you have any problems affording any of  your prescribed medications? No   Do you have problems obtaining/receiving your medications? No   Does the patient have transportation to healthcare appointments? Yes   Living Arrangements house   Does the patient have family/friends to help with healtcare needs after discharge? yes   Does your caregiver provide all the help you need? No   Are you currently feeling confused? No   Are you currently having problems thinking? No   Are you currently having memory problems? No

## 2019-11-01 NOTE — DISCHARGE INSTRUCTIONS
[facility] Facility Transfer Orders        Admit to:  [facility]    Diagnoses:   Active Hospital Problems    Diagnosis  POA    *Fall [W19.XXXA]  Yes    Closed fracture of right inferior pubic ramus [S32.591A]  Yes    ESRD (end stage renal disease) [N18.6]  Yes     Chronic    Hypertension [I10]  Yes    Type 2 diabetes mellitus with kidney complication, with long-term current use of insulin [E11.29, Z79.4]  Not Applicable     Chronic      Resolved Hospital Problems   No resolved problems to display.     Allergies:   Review of patient's allergies indicates:   Allergen Reactions    Metformin Rash and Nausea And Vomiting     Other reaction(s): Unknown    Sulfamethoxazole-trimethoprim Rash    Sulfamethoxazole Rash     Other reaction(s): Rash  Other reaction(s): Rash    Trimethoprim Rash     Other reaction(s): Rash  Other reaction(s): Rash       Code Status:  Full    Vitals: Routine       Diet: diabetic diet: 2000 calorie and renal diet    Activity: Activity as tolerated    Nursing Precautions: Aspiration , Fall and Pressure ulcer prevention    Bed/Surface: Low Air Loss    Consults: PT to evaluate and treat- 7 times a week and OT to evaluate and treat- 7 times a week    Oxygen: room air    Dialysis: Patient is on dialysis.  Monday Wednesday Friday    Labs:  Renal function panel weekly  Pending Diagnostic Studies:     None        Imaging:  None    Miscellaneous Care:   Routine Skin for Bedridden Patients:  Apply moisture barrier cream to all    IV Access: Peripheral     Medications: Discontinue all previous medication orders, if any. See new list below.  Current Discharge Medication List      CONTINUE these medications which have NOT CHANGED    Details   cetirizine (ZYRTEC) 10 MG tablet Take 10 mg by mouth once daily.      furosemide (LASIX) 20 MG tablet Take 20 mg by mouth 2 (two) times daily.       gabapentin (NEURONTIN) 100 MG capsule Take 100 mg by mouth 2 (two) times daily.        HYDROcodone-acetaminophen (NORCO) 5-325 mg per tablet Take 1 tablet by mouth every 8 (eight) hours as needed for Pain.  Qty: 25 tablet, Refills: 0      !! insulin NPH-insulin regular, 70/30, (NOVOLIN 70/30 U-100 INSULIN) 100 unit/mL (70-30) injection Inject 20 Units into the skin once daily. AND 10 UNITS QHS  Refills: 0      !! insulin NPH-insulin regular, 70/30, (NOVOLIN 70/30) 100 unit/mL (70-30) injection Inject 12 Units into the skin every evening. AND 30 UNITS QAM  Refills: 0      labetalol (NORMODYNE) 200 MG tablet Take 200 mg by mouth 2 (two) times daily.      omeprazole (PRILOSEC) 40 MG capsule Take 40 mg by mouth once daily.      pravastatin (PRAVACHOL) 40 MG tablet Take 40 mg by mouth every evening.       ranitidine (ZANTAC) 150 MG tablet Take 150 mg by mouth 2 (two) times daily.       !! sevelamer carbonate (RENVELA) 800 mg Tab Take 1,600 mg by mouth daily with dinner or evening meal.       !! sevelamer carbonate (RENVELA) 800 mg Tab Take 800 mg by mouth 2 (two) times daily with meals. 800 mg bid with smaller meals and 1600 mg qd with largest meal      vit B comp no.3/folic/C/biotin (TOM-ANA RX ORAL) Take 1 tablet by mouth once daily.      ondansetron (ZOFRAN-ODT) 4 MG TbDL Take 4 mg by mouth every 12 (twelve) hours as needed (nausea). Take for 15 days.      promethazine (PHENERGAN) 12.5 MG Tab Take 12.5 mg by mouth every 8 (eight) hours as needed (nausea). Take for 10 days.       !! - Potential duplicate medications found. Please discuss with provider.        Follow up:   Follow-up Information     Greg Skinner Iv, MD.    Specialty:  Family Medicine  Contact information:  [address]   [place] [address] [phone]

## 2019-11-01 NOTE — HOSPITAL COURSE
Patient was admitted to the hospital medicine service for profound weakness after recent pubic ramus fracture.  She was evaluated by PT and OT recommended rehab placement.  She was placed at [facility].  She receives HD Monday Wednesday and Friday and received her usual Friday HD treatment prior to discharge.    Discharge exam  Awake, alert, no apparent distress  Regular rate and rhythm no murmur  Lungs clear to auscultation bilaterally  Profoundly weak

## 2019-11-01 NOTE — PT/OT/SLP EVAL
Physical Therapy Evaluation    Patient Name:  Brandi Camacho   MRN:  [id number]    Recommendations:     Discharge Recommendations:  rehabilitation facility, nursing facility, skilled   Discharge Equipment Recommendations: (to be determined )   Barriers to discharge: Decreased caregiver support    Assessment:     Brandi Camacho is a 79 y.o. female admitted with a medical diagnosis of Fall.  She presents with the following impairments/functional limitations:  weakness, impaired endurance, impaired sensation, gait instability, impaired functional mobilty, impaired self care skills, impaired balance, decreased lower extremity function, pain, edema.    Rehab Prognosis: Fair; patient would benefit from acute skilled PT services to address these deficits and reach maximum level of function.    Recent Surgery: * No surgery found *      Plan:     During this hospitalization, patient to be seen daily to address the identified rehab impairments via gait training, therapeutic activities, therapeutic exercises and progress toward the following goals:    · Plan of Care Expires:  11/08/19    Subjective     Chief Complaint: pain  Patient/Family Comments/goals: for pt to get better and return to mod Indep mobility/go home.  Pain/Comfort:  · Pain Rating 1: 10/10(pt cried out with movement)  · Location - Side 1: Right  · Location 1: back  · Pain Addressed 1: Reposition, Distraction, Cessation of Activity, Nurse notified  · Pain Rating Post-Intervention 1: 3/10    Patients cultural, spiritual, [employer]alism conflicts given the current situation: no    Living Environment:  Pt lives with her  in a single story house with no steps to enter.  Prior to admission, patients level of function was Modified Independent until fall almost 2 weeks ago.  Equipment used at home: wheelchair, bedside commode, walker, rolling.  DME owned (not currently used): none.  Upon discharge, patient will have assistance from  and son.    Objective:      Communicated with LEATHA Woodson prior to session.  Patient found HOB elevated with peripheral IV, telemetry, bed alarm  upon PT entry to room.    General Precautions: Standard, fall, hearing impaired, vision impaired   Orthopedic Precautions:N/A   Braces: N/A     Exams:  · Cognitive Exam:  Patient is oriented to Person and Place  · RLE ROM: limited to PROM due to pain and weakness  · RLE Strength: limited to PROM due to pain and weakness  · LLE ROM: WFL  · LLE Strength: grossly 4-/5    Functional Mobility:  · Bed Mobility:     · Rolling Right: total assistance and of 2 persons  · Scooting: total assistance and of 2 persons  · Supine to Sit: total assistance and of 2 persons  · Sit to Supine: total assistance and of 2 persons  · Transfers:     · Bed to BSC: total assistance and of 2 persons with  no AD  using  Stand Pivot. Pt had BM and needed Total Assistance to manage hygiene and brief.      AM-PAC 6 CLICK MOBILITY  Total Score:10     Patient left HOB elevated with all lines intact, call button in reach, bed alarm on and RN  notified.    GOALS:   Multidisciplinary Problems     Physical Therapy Goals        Problem: Physical Therapy Goal    Goal Priority Disciplines Outcome Goal Variances Interventions   Physical Therapy Goal     PT, PT/OT Ongoing, Progressing     Description:  Goals to be met by: 2019     Patient will increase functional independence with mobility by performin. Supine to sit with Maximum Assistance  2. Sit to supine with Maximum Assistance  3. Sit to stand transfer with Maximum Assistance  4. Bed to chair transfer with Maximum Assistance using Rolling Walker  5. Gait  x 25 feet with Maximum Assistance using Rolling Walker.                       History:     Past Medical History:   Diagnosis Date    Anemia     sees Dr. Loyola and Dr. Turner    Anticoagulant long-term use     aspirin    Arthritis     Cancer     left breast ca    Diabetes mellitus     Encounter for blood transfusion      GERD (gastroesophageal reflux disease)     Hypertension     sees Dr. Farmer    Renal disorder     [facility] on hemodialysis MWF    Urinary tract infection        Past Surgical History:   Procedure Laterality Date    AV FISTULA PLACEMENT Right     BREAST SURGERY  2011    left mastectomy [facility] dr begum     SECTION      x2    endoscopic precancerous adenomatous      [facility] colvin, ampulla of vater    HYSTERECTOMY      EDI    left knee patella FRACTURE      MODIFIED RADICAL MASTECTOMY W/ AXILLARY LYMPH NODE DISSECTION      right knee orthoscope         Time Tracking:     PT Received On: 19  PT Start Time: 915     PT Stop Time: 50  PT Total Time (min): 35 min     Billable Minutes: Evaluation 15 and Therapeutic Activity 20      Gauri Walsh, PT  2019

## 2019-11-01 NOTE — PLAN OF CARE
Pt AAO, VSS. Plan of care reviewed with pt at beginning of shift.  Pt/family verbalized understanding. Blood glucose monitoring in progress. Multiple bruising over body. Hourly/ Q2 hourly rounds completed on this pt throughout shift.  Pain monitored, restroom offered, repositions with assistance, safety maintained.  Patient has remained free from fall/injury, no skin breakdown noted.  Side rails up x2, bed in locked and lowest position, call light kept within reach.  Needs attended to, will continue to monitor/ update as indicated.

## 2019-11-01 NOTE — PLAN OF CARE
11/01/19 1555   Final Note   Assessment Type Final Discharge Note   Anticipated Discharge Disposition Rehab  ([facility])

## 2019-11-01 NOTE — PLAN OF CARE
11/01/19 1253   Post-Acute Status   Post-Acute Authorization Placement   LORENZO spoke to Mike @ [facility] to inform [facility] and is asking for hep panel results to be faxed to [phone], LORENZO faxed and informed that patient will be finished HD in 2 hours. [facility] and [facility]. LORENZO will follow.     200pm LORENZO asked floor nurse to address immunizations so that avs can be sent to [facility]. Mike with [facility] setting up transportation for around 5pm. CM will follow.    229pm LORENZO placed another rcall to f 4th floor nurse and spoke to Kaela, requesting that patient's nurse address immunizations can be sent to [facility]. LORENZO will follow.

## 2019-11-01 NOTE — DISCHARGE SUMMARY
[facility] Hospital Medicine  Discharge Summary      Patient Name: Brandi Camacho  MRN: [id number]  Admission Date: 10/31/2019  Hospital Length of Stay: 0 days  Discharge Date and Time: No discharge date for patient encounter.  Attending Physician: Sonya Brown MD   Discharging Provider: Sonya Brown MD  Primary Care Provider: Greg Skinner Iv, MD      HPI:   Brandi Camacho is a 79 y.o. female with PMHx of DMII, hypertension, and ESRD on Monday Wednesday Friday HD  who presents to the ED s/p multiple falls at home. The patient had a fall 8 days ago for which she was seen at [facility] ED.  CT scan at that time showed a nondisplaced fracture of the inferior right pubic ramus and a questionable fracture of the anterior column of the right acetabulum. She was placed in observation to the hospital at that time during which time PT and OT recommended skilled nursing facility placement , though this was unable to be arranged and patient was discharged home with home health.  Patient reports that she has continued falls since discharge and feels incredibly weak.  She is unable to lift either leg off the bed.  Case management worked to arrange admission to [facility] rehab from the ED however this was unable to be arranged today so ER physician feels unsafe discharging patient and requests I observe her overnight for PT/OT eval and rehab placement.    ER labs reviewed and are at baseline for patient.    * No surgery found *      Hospital Course:   Patient was admitted to the hospital medicine service for profound weakness after recent pubic ramus fracture.  She was evaluated by PT and OT recommended rehab placement.  She was placed at [facility] rehab.  She receives HD Monday Wednesday and Friday and received her usual Friday HD treatment prior to discharge.    Discharge exam  Awake, alert, no apparent distress  Regular rate and rhythm no murmur  Lungs clear to auscultation bilaterally  Profoundly weak     Consults:    Consults (From admission, onward)        Status Ordering Provider     Inpatient consult to Nephrology  Once     Provider:  Lavell Wolf MD    Acknowledged AMBER DONOVAN     Inpatient consult to Social Work/Case Management  Once     Provider:  (Not yet assigned)    Acknowledged AMBER DONOVAN          No new Assessment & Plan notes have been filed under this hospital service since the last note was generated.  Service: Hospital Medicine    Final Active Diagnoses:    Diagnosis Date Noted POA    PRINCIPAL PROBLEM:  Fall [W19.XXXA] 10/31/2019 Yes    Closed fracture of right inferior pubic ramus [S32.591A] 10/23/2019 Yes    ESRD (end stage renal disease) [N18.6] 10/23/2019 Yes     Chronic    Hypertension [I10] 11/06/2015 Yes    Type 2 diabetes mellitus with kidney complication, with long-term current use of insulin [E11.29, Z79.4] 11/06/2015 Not Applicable     Chronic      Problems Resolved During this Admission:       Discharged Condition: good    Disposition: Rehab Facility    Follow Up:  Follow-up Information     Greg Skinner Iv, MD.    Specialty:  Family Medicine  Contact information:  [address]   [place] [address]  [phone]                 Patient Instructions:      Notify your health care provider if you experience any of the following:  temperature >100.4     Notify your health care provider if you experience any of the following:  persistent nausea and vomiting or diarrhea     Notify your health care provider if you experience any of the following:  severe uncontrolled pain     Notify your health care provider if you experience any of the following:  difficulty breathing or increased cough     Activity as tolerated       Significant Diagnostic Studies: Labs:   BMP:   Recent Labs   Lab 10/31/19  1510 11/01/19  0534    91    135*   K 4.6 5.0    100   CO2 25 21*   BUN 37* 45*   CREATININE 5.1* 5.8*   CALCIUM 8.2* 8.4*   MG  --  2.2   , CMP   Recent Labs   Lab  10/31/19  1510 11/01/19  0534    135*   K 4.6 5.0    100   CO2 25 21*    91   BUN 37* 45*   CREATININE 5.1* 5.8*   CALCIUM 8.2* 8.4*   ANIONGAP 11 14   ESTGFRAFRICA 9* 7*   EGFRNONAA 7* 6*    and CBC   Recent Labs   Lab 10/31/19  1510   WBC 5.06   HGB 10.1*   HCT 31.6*          Pending Diagnostic Studies:     None       Radiology Results (last 7 days)     Procedure Component Value Units Date/Time   CT Pelvis Without Contrast [[id number]] Resulted: 10/31/19 1613   Order Status: Completed Updated: 10/31/19 1616   Narrative:     EXAMINATION:  CT PELVIS WITHOUT CONTRAST    CLINICAL HISTORY:  Pelvic fracture, known or suspected;    TECHNIQUE:  Axial images through the pelvis were acquired without contrast with multiplanar reformatted images created.    COMPARISON:  10/23/2019    FINDINGS:  The bones are diffusely osteopenic.  There is a mildly displaced fracture of the right inferior pubic ramus which is without significant change.  There is also an essentially nondisplaced fracture of the right anterior acetabulum at the pubic root, which is also unchanged.  No new fracture is identified.    There is heavy calcification of the infrarenal aorta.  Generalized mural thickening of the urinary bladder is present.  There has been hysterectomy.  There is moderate diverticulosis coli.  Moderate generalized body wall edema is present.  A few small gallstones are present.   Impression:       Nondisplaced fractures of the right inferior pubic ramus and right pubic root without significant change.  Diffuse osteopenia.      Electronically signed by: Josef Lawson MD  Date: 10/31/2019  Time: 16:13   CT Cervical Spine Without Contrast [[id number]] Resulted: 10/31/19 1444   Order Status: Completed Updated: 10/31/19 1447   Narrative:     EXAMINATION:  CT CERVICAL SPINE WITHOUT CONTRAST    CLINICAL HISTORY:  C-spine trauma, NEXUS/CCR positive, low risk;    TECHNIQUE:  Low dose axial images, sagittal and  coronal reformations were preformed though the cervical spine.  Contrast was not administered.    COMPARISON:  None    FINDINGS:  Vertebral column: There is extensive degenerative change throughout the entire cervical spine.  There is no acute fracture.  Vertebral body height is preserved.  There is trace anterolisthesis of C3 on C4, C7 on T1 and T1 on T2 which is likely degenerative in etiology.  There is facet joint arthropathy at both of these levels.  There is marked disc space narrowing from the C3-4 through C6-7 levels where there is endplate sclerosis and osteophyte formation.  The odontoid process is intact.  The anterior and posterior arches of C1 are normal.  The craniovertebral junction is preserved.  There is extensive multilevel facet joint arthropathy but the facet joints maintain normal articulation.    Spinal canal, cord, epidural space: The spinal canal is developmentally normal.  There is no abnormal epidural soft tissue mass or fluid collection.    Findings by level:    C1-2: Alignment is normal.  There is bilateral joint space narrowing.    C2-3: There is left facet joint arthropathy and uncovertebral spurring contributing to mild left foraminal stenosis.  There is a minimal disc bulge without significant spinal stenosis.    C3-4: There is moderate spinal stenosis with marked bilateral foraminal stenosis due to changes of uncovertebral spurring and facet joint arthropathy in addition to a broad disc osteophyte complex.    C4-5: There is marked disc space narrowing, bilateral uncovertebral spurring, right greater than left facet joint arthropathy with a moderate broad disc protrusion/osteophyte contributing to moderate spinal stenosis with severe left and moderate left foraminal stenosis.    C5-6: There is marked disc space narrowing, bilateral uncovertebral spurring with a moderate broad disc protrusion/osteophyte and bilateral facet joint arthropathy contributing to moderate spinal canal and  bilateral foraminal stenosis.    C6-7: There is marked disc space narrowing.  There is bilateral uncovertebral spurring with a moderate broad disc protrusion/osteophyte eccentric to the right resulting in moderate spinal stenosis and severe bilateral foraminal stenosis.    C7-T1: There is trace anterolisthesis of C7 on T1.  There is marked right and moderate-to-marked left facet joint arthropathy.  There is no significant spinal stenosis.  There is at least moderate bilateral foraminal stenosis.    Soft tissues, other: The prevertebral soft tissues are grossly normal without obvious edema.  The airway is patent.  There is no apical pneumothorax.   Impression:       1. There is extensive multilevel degenerative disc and facet disease including some degree of disc space narrowing, disc osteophyte complex, uncovertebral spurring and facet joint arthropathy at multiple levels.  Also, there is trace degenerative listhesis at several levels but there is no acute fracture or traumatic subluxation.  There is some degree of spinal canal and foraminal stenosis related to these degenerative changes.  These findings are discussed in detail by level above.      Electronically signed by: Bandar Purcell MD  Date: 10/31/2019  Time: 14:44   X-Ray Hip 2 View Right [[id number]] Resulted: 10/31/19 1443   Order Status: Completed Updated: 10/31/19 1445   Narrative:     EXAMINATION:  XR HIP 2 VIEW RIGHT    CLINICAL HISTORY:  Unspecified fall, initial encounter    TECHNIQUE:  AP view of the pelvis and frog leg lateral view of the right hip were performed.    COMPARISON:  Pelvic x-ray of October 23, 2019.    FINDINGS:  There is diffuse osteoporosis.  At the right hip a fracture is not seen.  No dislocation is noted.  A fracture in the rest of the bones of the pelvis or left hip are not seen.  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  Atherosclerotic  calcification is noted in the aorta, pelvic vessels and femoral vessels.   Impression:       No acute fracture seen.  Osteoporosis noted.  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  Atherosclerosis.      Electronically signed by: Yuniel Ortiz MD  Date: 10/31/2019  Time: 14:43   CT Head Without Contrast [[id number]] Resulted: 10/31/19 1438   Order Status: Completed Updated: 10/31/19 1440   Narrative:     EXAMINATION:  CT HEAD WITHOUT CONTRAST    CLINICAL HISTORY:  Headache, post trauma;    TECHNIQUE:  Routine unenhanced axial images were obtained through the head.  Sagittal and coronal reformatted images were created.  The study is reviewed in bone and soft tissue windows.    COMPARISON:  Head CT dated 08/08/2017    FINDINGS:  Intracranial contents: There is no acute intracranial abnormality or definite change compared to the prior study.  There is generalized volume loss with mild nonspecific white matter change.  There is no hemorrhage or mass effect.  There is no obvious acute infarction.  There is no hydrocephalus or midline shift.  There is no abnormal extra-axial fluid collection.  The basilar cisterns are open.  The cerebellar tonsils are in normal position.  Sellar structures are normal.    Extracranial contents, calvarium, soft tissues: There are changes of hyperostosis frontalis interna.  There is no acute skull fracture.  There is moderate to marked atherosclerosis.  There is mucosal thickening in the right sphenoid sinus.  Otherwise, the included paranasal sinuses and mastoid air cells are clear.   Impression:       1. There is no acute abnormality.  There is volume loss and nonspecific white matter change.  There is no intracranial hemorrhage, mass effect or obvious acute edema or ischemia.  There is no acute skull fracture.  There is no definite change when compared to the prior study.  2. Atherosclerosis.      Electronically signed by: Bandar Purcell MD  Date:  10/31/2019  Time: 14:38       Medications:  Reconciled Home Medications:      Medication List      CHANGE how you take these medications    HYDROcodone-acetaminophen 5-325 mg per tablet  Commonly known as:  NORCO  Take 1 tablet by mouth every 8 (eight) hours as needed for Pain.  What changed:  additional instructions     * insulin NPH-insulin regular (70/30) 100 unit/mL (70-30) injection  Commonly known as:  NovoLIN 70/30 U-100 Insulin  Inject 20 Units into the skin once daily. AND 10 UNITS QHS  What changed:    · how much to take  · when to take this  · additional instructions     * insulin NPH-insulin regular (70/30) 100 unit/mL (70-30) injection  Commonly known as:  NOVOLIN 70/30  Inject 12 Units into the skin every evening. AND 30 UNITS QAM  What changed:    · how much to take  · additional instructions         * This list has 2 medication(s) that are the same as other medications prescribed for you. Read the directions carefully, and ask your doctor or other care provider to review them with you.            CONTINUE taking these medications    cetirizine 10 MG tablet  Commonly known as:  ZYRTEC  Take 10 mg by mouth once daily.     furosemide 20 MG tablet  Commonly known as:  LASIX  Take 20 mg by mouth 2 (two) times daily.     gabapentin 100 MG capsule  Commonly known as:  NEURONTIN  Take 100 mg by mouth 2 (two) times daily.     labetalol 200 MG tablet  Commonly known as:  NORMODYNE  Take 200 mg by mouth 2 (two) times daily.     omeprazole 40 MG capsule  Commonly known as:  PRILOSEC  Take 40 mg by mouth once daily.     ondansetron 4 MG Tbdl  Commonly known as:  ZOFRAN-ODT  Take 4 mg by mouth every 12 (twelve) hours as needed (nausea). Take for 15 days.     pravastatin 40 MG tablet  Commonly known as:  PRAVACHOL  Take 40 mg by mouth every evening.     promethazine 12.5 MG Tab  Commonly known as:  PHENERGAN  Take 12.5 mg by mouth every 8 (eight) hours as needed (nausea). Take for 10 days.     ranitidine 150 MG  tablet  Commonly known as:  ZANTAC  Take 150 mg by mouth 2 (two) times daily.     TOM-ANA RX ORAL  Take 1 tablet by mouth once daily.     * sevelamer carbonate 800 mg Tab  Commonly known as:  RENVELA  Take 1,600 mg by mouth daily with dinner or evening meal.     * sevelamer carbonate 800 mg Tab  Commonly known as:  RENVELA  Take 800 mg by mouth 2 (two) times daily with meals. 800 mg bid with smaller meals and 1600 mg qd with largest meal         * This list has 2 medication(s) that are the same as other medications prescribed for you. Read the directions carefully, and ask your doctor or other care provider to review them with you.                Indwelling Lines/Drains at time of discharge:   Lines/Drains/Airways     Central Venous Catheter Line                 Hemodialysis Catheter 04/21/16 2020 1288 days          Drain                 Hemodialysis AV Fistula Right upper arm -- days         Hemodialysis AV Graft Right forearm -- days                Time spent on the discharge of patient: 32 minutes  Patient was seen and examined on the date of discharge and determined to be suitable for discharge.         Sonya Brown MD  Department of Hospital Medicine  [facility]

## 2019-11-01 NOTE — PLAN OF CARE
Problem: Occupational Therapy Goal  Goal: Occupational Therapy Goal  Description  Goals to be met by: 11/15/19     Patient will increase functional independence with ADLs by performing:    LE Dressing with Moderate Assistance with AE.  Grooming while seated EOB with Stand-by Assistance.  Toilet transfer to bedside commode with Moderate Assistance.     Outcome: Ongoing, Not Progressing

## 2019-11-01 NOTE — PLAN OF CARE
11/01/19 1548   Post-Acute Status   Post-Acute Authorization Placement   Post-Acute Placement Status Set-up Complete   Patient will be picked up by [facility] transport at 500pm, please call report to Justin @ [phone] between 430 and 445pm. Floor nurse notified. Patient aware of discharge disposition.

## 2019-11-01 NOTE — CONSULTS
Nephrology Consult Note        Patient Name: Brandi Camacho  MRN: [id number]    Patient Class: OP- Observation   Admission Date: 10/31/2019  Length of Stay: 0 days  Date of Service: 2019    Attending Physician: Sonya Brown MD  Primary Care Provider: Greg Skinner Iv, MD    Reason for Consult: esrd/anemia/htn/shpt/fall/weakness    SUBJECTIVE:     HPI: 79F with DMII, HTN, ESRD on HD MWF presents s/p multiple falls at home. She was seen at [facility] few days ago for a nondisplaced fracture of the inferior right pubic ramus and a questionable fracture of the anterior column of the right acetabulum. PT and OT recommended skilled nursing facility placement, though this was unable to be arranged and patient was discharged home with home health. She continued to have falls after discharge and feels incredibly weak, unable to lift either leg off the bed. Renal consulted for comanagement,     VSS, seen and examined on HD today, no new complains.    Past Medical History:   Diagnosis Date    Anemia     sees Dr. Loyola and Dr. Turner    Anticoagulant long-term use     aspirin    Arthritis     Cancer     left breast ca    Diabetes mellitus     Encounter for blood transfusion     GERD (gastroesophageal reflux disease)     Hypertension     sees Dr. Farmer    Renal disorder     CRF on hemodialysis MWF    Urinary tract infection      Past Surgical History:   Procedure Laterality Date    AV FISTULA PLACEMENT Right     BREAST SURGERY  2011    left mastectomy [facility] dr begum     SECTION      x2    endoscopic precancerous adenomatous      [facility] [place] dr colvin, ampulla of vater    HYSTERECTOMY      EDI    left knee patella FRACTURE      MODIFIED RADICAL MASTECTOMY W/ AXILLARY LYMPH NODE DISSECTION      right knee orthoscope       Family History   Problem Relation Age of Onset    Cancer Sister     Cancer Mother         pancreatic    Diabetes Mother     Hearing loss Mother     Cancer Father          bladder    Diabetes Father     Cancer Sister         half sister, breast     Social History     Tobacco Use    Smoking status: Never Smoker    Smokeless tobacco: Never Used   Substance Use Topics    Alcohol use: No    Drug use: No       Review of patient's allergies indicates:   Allergen Reactions    Metformin Rash and Nausea And Vomiting     Other reaction(s): Unknown    Sulfamethoxazole-trimethoprim Rash    Sulfamethoxazole Rash     Other reaction(s): Rash  Other reaction(s): Rash    Trimethoprim Rash     Other reaction(s): Rash  Other reaction(s): Rash       Outpatient meds:  No current facility-administered medications on file prior to encounter.      Current Outpatient Medications on File Prior to Encounter   Medication Sig Dispense Refill    cetirizine (ZYRTEC) 10 MG tablet Take 10 mg by mouth once daily.      furosemide (LASIX) 20 MG tablet Take 20 mg by mouth 2 (two) times daily.       gabapentin (NEURONTIN) 100 MG capsule Take 100 mg by mouth 2 (two) times daily.       HYDROcodone-acetaminophen (NORCO) 5-325 mg per tablet Take 1 tablet by mouth every 8 (eight) hours as needed for Pain. (Patient taking differently: Take 1 tablet by mouth every 8 (eight) hours as needed for Pain. Take for 8 days.) 25 tablet 0    insulin NPH-insulin regular, 70/30, (NOVOLIN 70/30 U-100 INSULIN) 100 unit/mL (70-30) injection Inject 20 Units into the skin once daily. AND 10 UNITS QHS (Patient taking differently: Inject 30 Units into the skin every morning. )  0    insulin NPH-insulin regular, 70/30, (NOVOLIN 70/30) 100 unit/mL (70-30) injection Inject 12 Units into the skin every evening. AND 30 UNITS QAM (Patient taking differently: Inject 10 Units into the skin every evening. )  0    labetalol (NORMODYNE) 200 MG tablet Take 200 mg by mouth 2 (two) times daily.      omeprazole (PRILOSEC) 40 MG capsule Take 40 mg by mouth once daily.      pravastatin (PRAVACHOL) 40 MG tablet Take 40 mg by mouth every  evening.       ranitidine (ZANTAC) 150 MG tablet Take 150 mg by mouth 2 (two) times daily.       sevelamer carbonate (RENVELA) 800 mg Tab Take 1,600 mg by mouth daily with dinner or evening meal.       sevelamer carbonate (RENVELA) 800 mg Tab Take 800 mg by mouth 2 (two) times daily with meals. 800 mg bid with smaller meals and 1600 mg qd with largest meal      vit B comp no.3/folic/C/biotin (TOM-ANA RX ORAL) Take 1 tablet by mouth once daily.      ondansetron (ZOFRAN-ODT) 4 MG TbDL Take 4 mg by mouth every 12 (twelve) hours as needed (nausea). Take for 15 days.      promethazine (PHENERGAN) 12.5 MG Tab Take 12.5 mg by mouth every 8 (eight) hours as needed (nausea). Take for 10 days.         Scheduled meds:   gabapentin  100 mg Oral BID    heparin (porcine)  5,000 Units Subcutaneous Q8H    insulin aspart protamine-insulin aspart  30 Units Subcutaneous with breakfast    insulin aspart protamine-insulin aspart  12 Units Subcutaneous QHS    labetalol  200 mg Oral BID    mupirocin   Nasal BID    pravastatin  40 mg Oral QHS    sevelamer carbonate  1,600 mg Oral Daily with dinner    sevelamer carbonate  800 mg Oral BID WM    tuberculin  5 Units Intradermal Once       Infusions:      PRN meds:  dextrose 50%, dextrose 50%, glucagon (human recombinant), glucose, glucose, HYDROcodone-acetaminophen, insulin aspart U-100, ondansetron, sodium chloride 0.9%    Review of Systems:  Review of Systems   Constitutional: Positive for malaise/fatigue. Negative for chills, fever and weight loss.   HENT: Negative for hearing loss and nosebleeds.    Eyes: Negative for blurred vision, double vision and photophobia.   Respiratory: Negative for cough, shortness of breath and wheezing.    Cardiovascular: Negative for chest pain, palpitations and leg swelling.   Gastrointestinal: Negative for abdominal pain, constipation, diarrhea, heartburn, nausea and vomiting.   Genitourinary: Negative for dysuria, frequency and urgency.    Musculoskeletal: Negative for falls, joint pain and myalgias.   Skin: Negative for itching and rash.   Neurological: Negative for dizziness, speech change, focal weakness, loss of consciousness and headaches.   Endo/Heme/Allergies: Does not bruise/bleed easily.   Psychiatric/Behavioral: Negative for depression and substance abuse. The patient is not nervous/anxious.        OBJECTIVE:     Vital Signs and IO (Last 24H):  Temp:  [97.2 °F (36.2 °C)-98.7 °F (37.1 °C)]   Pulse:  [63-69]   Resp:  [16-18]   BP: (116-200)/(50-82)   SpO2:  [94 %-97 %]   I/O last 3 completed shifts:  In: 150 [P.O.:150]  Out: 0     Wt Readings from Last 5 Encounters:   10/31/19 87.5 kg (192 lb 14.4 oz)   10/23/19 81.6 kg (179 lb 14.3 oz)   09/13/18 79.8 kg (176 lb)   03/15/18 80.3 kg (177 lb)   04/20/16 84.8 kg (187 lb)         Physical Exam:  Physical Exam   Constitutional: She is oriented to person, place, and time. She appears well-developed and well-nourished.   HENT:   Head: Normocephalic and atraumatic.   Mouth/Throat: Oropharynx is clear and moist.   Eyes: Pupils are equal, round, and reactive to light. EOM are normal. No scleral icterus.   Neck: Neck supple.   Cardiovascular: Normal rate and regular rhythm.   Pulmonary/Chest: Effort normal. No stridor. No respiratory distress.   Abdominal: Soft. She exhibits no distension.   Musculoskeletal: Normal range of motion. She exhibits no edema or deformity.   Neurological: She is alert and oriented to person, place, and time. No cranial nerve deficit.   Skin: Skin is warm and dry. No rash noted. She is not diaphoretic. No erythema.   Psychiatric: She has a normal mood and affect. Her behavior is normal.       Body mass index is 32.1 kg/m².    Laboratory:  Recent Labs   Lab 10/28/19  0625 10/31/19  1510 11/01/19  0534   * 136 135*   K 5.3* 4.6 5.0   CL 90* 100 100   CO2 21* 25 21*   BUN 74* 37* 45*   CREATININE 7.6* 5.1* 5.8*   ESTGFRAFRICA 5.3* 9* 7*   EGFRNONAA 4.6* 7* 6*   *  102 91       Recent Labs   Lab 10/27/19  0501 10/28/19  0625 10/31/19  1510 11/01/19  0534   CALCIUM 7.8* 7.7* 8.2* 8.4*   PHOS 6.9* 8.0*  --  6.2*   MG 2.1 2.1  --  2.2             Recent Labs   Lab 10/31/19  2131 11/01/19  0459   POCTGLUCOSE 141* 66*       Recent Labs   Lab 10/24/19  0515   Hemoglobin A1C 6.2       Recent Labs   Lab 10/31/19  1510   WBC 5.06   HGB 10.1*   HCT 31.6*      *   MCHC 32.0   MONO 15.8*  0.8       No results for input(s): BILITOT, BILIDIR, PROT, ALBUMIN, ALKPHOS, ALT, AST in the last 168 hours.          Invalid input(s): LEUCOCYTESUR          Microbiology Results (last 7 days)     ** No results found for the last 168 hours. **            ASSESSMENT/PLAN:     Active Hospital Problems    Diagnosis  POA    *Fall [W19.XXXA]  Yes    Closed fracture of right inferior pubic ramus [S32.591A]  Yes    ESRD (end stage renal disease) [N18.6]  Yes     Chronic    Hypertension [I10]  Yes    Type 2 diabetes mellitus with kidney complication, with long-term current use of insulin [E11.29, Z79.4]  Not Applicable     Chronic      Resolved Hospital Problems   No resolved problems to display.       ESRD on HD MWF via AVF  Continue current dialysis prescription.  Next HD on Monday or PRN.  Renal diet - low K, low phos.  No IVs or BP checks on access and/or non-dominant arm.    Anemia of CKD  Hgb and HCT are acceptable. Monitor.  Will provide SARAH BETH and/or IV iron PRN.    MBD / Secondary HPT  Ca, phos, PTH and vitamin D levels are acceptable.   Phos binders, vitamin D analogues and calcimimetics as needed.    HTN  BP seem controlled.   Tolerate asymptomatic HTN up to -160.  Continue home meds.  Low sodium diet.    Weakness, pelvic fracture, falls  Management per primary team.    Thank you for allowing us to participate in the care of your patient!   We will follow the patient and provide recommendations as needed.    Lavell Wolf MD    [facility]  [address]  [place] [address]    [phone] - tel  [phone] - fax    11/1/2019 1:39 PM

## 2019-11-01 NOTE — UM SECONDARY REVIEW
Physician Advisor External    Level of Care Issue    11/1 @ 10:14am Per EHR online outpatient 10/31 per Dr Ortega. kv

## 2019-11-01 NOTE — NURSING
Pt. Discharged to AM. Discharge instructions given to pt. And her husbnad. Transferred to wheelchair with max assist, with . Iv removed. Cath intact. Minimal bleeding noted. Dressing applied.

## 2019-11-01 NOTE — PT/OT/SLP EVAL
Occupational Therapy   Evaluation    Name: Brandi Camacho  MRN: [id number]  Admitting Diagnosis:  Fall      Recommendations:     Discharge Recommendations: nursing facility, skilled  Discharge Equipment Recommendations:  none  Barriers to discharge:  None    Assessment:     Brandi Camacho is a 79 y.o. female with a medical diagnosis of Fall.  She presents with significantly decreased independence with self care and associated functional mobility as pt currently requires Total (A) for bathing, toileting, and LB dressing and Min (A) with UB dressing. Biggest barrier is pain. Performance deficits affecting function: weakness, impaired endurance, impaired self care skills, impaired functional mobilty, gait instability, impaired balance, decreased safety awareness, pain, decreased lower extremity function, decreased upper extremity function.      Rehab Prognosis: Good; patient would benefit from acute skilled OT services to address these deficits and reach maximum level of function.       Plan:     Patient to be seen 3 x/week to address the above listed problems via self-care/home management, therapeutic activities, therapeutic exercises  · Plan of Care Expires: 11/15/19  · Plan of Care Reviewed with: patient, spouse    Subjective     Chief Complaint: Pain of lower back with any movement or bed repositioning  Patient/Family Comments/goals: Pain management and to get stronger     Occupational Profile:  Living Environment: Patient lives with spouse in [facility] with walk-in shower and shower seat  Previous level of function: Mod (I) to (I) with ADL and associated functional mobility using rollator, shower chair   Equipment Used at Home:  rollator, cane, straight(Walk-in shower with shower seat)  Assistance upon Discharge: Recommend SNF - (A) from facility staff upon discharge    Pain/Comfort:  · Pain Rating 1: (unrated pain of low back upon any bed mobility or repositioning)  · Pain Addressed 1: Reposition, Distraction, Cessation  of Activity, Nurse notified  · Pain Rating Post-Intervention 1: (no pain reported at end of session)    Patients cultural, spiritual, Christian conflicts given the current situation: no    Objective:     Communicated with: Nurse prior to session.  Patient found HOB elevated with bed alarm, peripheral IV(spouse present) upon OT entry to room.    General Precautions: Standard, fall   Orthopedic Precautions:N/A   Braces: N/A     Occupational Performance:    Bed Mobility:    · Total (A)     Functional Mobility/Transfers:  · N/A  · Functional Mobility: N/A    Activities of Daily Living:  · Feeding:  Set-up of items otherwise Supervision    · Grooming: Set-up of items with HOB elevated; otherwise (i)    · Upper Body Dressing: Min/Mod (A) to don/doff shirt with HOB elevated    · Lower Body Dressing: Total (A)      · Toileting: Total (A)       Cognitive/Visual Perceptual:  Cognitive/Psychosocial Skills:     -       Oriented to: Person, Place and Situation   -       Follows Commands/attention:Follows one-step commands  -       Communication: clear/fluent  -       Safety awareness/insight to disability: impaired     Physical Exam:  Postural examination/scapula alignment:    -       Rounded shoulders  Skin integrity: Visible skin intact  Dominant hand:    -       Left  Upper Extremity Strength:    -       Right Upper Extremity: 3+/5 grossly  -       Left Upper Extremity: WFL    AMPAC 6 Click ADL:  AMPAC Total Score: 13    Treatment & Education:  - OTR providing education/instruction regarding OT role/POC, adaptive techniques to increase safety and (I), purpose of post acute placement, pain management, etc - pt and spouse verbalize understanding when appropriate but will benefit from reinforcement     Education:    Patient left HOB elevated with all lines intact, call button in reach, bed alarm on, NurseChintan notified and spouse present    GOALS:   Multidisciplinary Problems     Occupational Therapy Goals        Problem:  Occupational Therapy Goal    Goal Priority Disciplines Outcome Interventions   Occupational Therapy Goal     OT, PT/OT Ongoing, Not Progressing    Description:  Goals to be met by: 11/15/19     Patient will increase functional independence with ADLs by performing:    LE Dressing with Moderate Assistance with AE.  Grooming while seated EOB with Stand-by Assistance.  Toilet transfer to bedside commode with Moderate Assistance.             Problem: Occupational Therapy Goal    Goal Priority Disciplines Outcome Interventions   Occupational Therapy Goal     OT, PT/OT Ongoing, Not Progressing                    History:     Past Medical History:   Diagnosis Date    Anemia     sees Dr. Loyola and Dr. Turner    Anticoagulant long-term use     aspirin    Arthritis     Cancer     left breast ca    Diabetes mellitus     Encounter for blood transfusion     GERD (gastroesophageal reflux disease)     Hypertension     sees Dr. Farmer    Renal disorder     CRF on hemodialysis MWF    Urinary tract infection        Past Surgical History:   Procedure Laterality Date    AV FISTULA PLACEMENT Right     BREAST SURGERY  2011    left mastectomy [facility] dr begum     SECTION      x2    endoscopic precancerous adenomatous      main [place] dr colvin, ampulla of vater    HYSTERECTOMY      EDI    left knee patella FRACTURE      MODIFIED RADICAL MASTECTOMY W/ AXILLARY LYMPH NODE DISSECTION      right knee orthoscope         Time Tracking:     OT Date of Treatment: 19  OT Start Time: 1110  OT Stop Time: 1128  OT Total Time (min): 18 min    Billable Minutes:Evaluation 18    AYDEE Martin LOTR  2019

## 2019-11-04 ENCOUNTER — TELEPHONE (OUTPATIENT)
Dept: MEDSURG UNIT | Facility: HOSPITAL | Age: 79
End: 2019-11-04

## 2019-11-07 ENCOUNTER — EXTERNAL HOME HEALTH (OUTPATIENT)
Dept: HOME HEALTH SERVICES | Facility: HOSPITAL | Age: 79
End: 2019-11-07

## 2019-11-23 ENCOUNTER — HOSPITAL ENCOUNTER (OUTPATIENT)
Facility: HOSPITAL | Age: 79
Discharge: HOME HEALTH | End: 2019-11-25
Attending: EMERGENCY MEDICINE | Admitting: HOSPITALIST
Payer: MEDICARE

## 2019-11-23 DIAGNOSIS — E16.2 HYPOGLYCEMIA: Primary | ICD-10-CM

## 2019-11-23 DIAGNOSIS — R07.9 CHEST PAIN: ICD-10-CM

## 2019-11-23 DIAGNOSIS — E11.649 HYPOGLYCEMIA ASSOCIATED WITH TYPE 2 DIABETES MELLITUS: ICD-10-CM

## 2019-11-23 PROBLEM — R53.81 PHYSICAL DEBILITY: Status: ACTIVE | Noted: 2019-10-24

## 2019-11-23 PROBLEM — R53.2 FUNCTIONAL QUADRIPLEGIA: Status: ACTIVE | Noted: 2019-11-23

## 2019-11-23 PROBLEM — D63.8 ANEMIA OF CHRONIC DISEASE: Status: ACTIVE | Noted: 2019-11-23

## 2019-11-23 LAB
ALBUMIN SERPL BCP-MCNC: 2.9 G/DL (ref 3.5–5.2)
ALP SERPL-CCNC: 171 U/L (ref 55–135)
ALT SERPL W/O P-5'-P-CCNC: 22 U/L (ref 10–44)
ANION GAP SERPL CALC-SCNC: 9 MMOL/L (ref 8–16)
AST SERPL-CCNC: 32 U/L (ref 10–40)
BASOPHILS # BLD AUTO: 0.01 K/UL (ref 0–0.2)
BASOPHILS NFR BLD: 0.2 % (ref 0–1.9)
BILIRUB SERPL-MCNC: 0.6 MG/DL (ref 0.1–1)
BUN SERPL-MCNC: 24 MG/DL (ref 8–23)
CALCIUM SERPL-MCNC: 9.3 MG/DL (ref 8.7–10.5)
CHLORIDE SERPL-SCNC: 105 MMOL/L (ref 95–110)
CO2 SERPL-SCNC: 26 MMOL/L (ref 23–29)
CREAT SERPL-MCNC: 4.1 MG/DL (ref 0.5–1.4)
DIFFERENTIAL METHOD: ABNORMAL
EOSINOPHIL # BLD AUTO: 0.1 K/UL (ref 0–0.5)
EOSINOPHIL NFR BLD: 2 % (ref 0–8)
ERYTHROCYTE [DISTWIDTH] IN BLOOD BY AUTOMATED COUNT: 16 % (ref 11.5–14.5)
EST. GFR  (AFRICAN AMERICAN): 11 ML/MIN/1.73 M^2
EST. GFR  (NON AFRICAN AMERICAN): 10 ML/MIN/1.73 M^2
GLUCOSE SERPL-MCNC: 77 MG/DL (ref 70–110)
HCT VFR BLD AUTO: 31.3 % (ref 37–48.5)
HGB BLD-MCNC: 9.9 G/DL (ref 12–16)
IMM GRANULOCYTES # BLD AUTO: 0.03 K/UL (ref 0–0.04)
LYMPHOCYTES # BLD AUTO: 0.9 K/UL (ref 1–4.8)
LYMPHOCYTES NFR BLD: 17.2 % (ref 18–48)
MAGNESIUM SERPL-MCNC: 2.1 MG/DL (ref 1.6–2.6)
MCH RBC QN AUTO: 33.2 PG (ref 27–31)
MCHC RBC AUTO-ENTMCNC: 31.6 G/DL (ref 32–36)
MCV RBC AUTO: 105 FL (ref 82–98)
MONOCYTES # BLD AUTO: 0.6 K/UL (ref 0.3–1)
MONOCYTES NFR BLD: 10.7 % (ref 4–15)
NEUTROPHILS # BLD AUTO: 3.6 K/UL (ref 1.8–7.7)
NEUTROPHILS NFR BLD: 69.3 % (ref 38–73)
NRBC BLD-RTO: 0 /100 WBC
PLATELET # BLD AUTO: 118 K/UL (ref 150–350)
PMV BLD AUTO: 9.9 FL (ref 9.2–12.9)
POCT GLUCOSE: 131 MG/DL (ref 70–110)
POCT GLUCOSE: 133 MG/DL (ref 70–110)
POCT GLUCOSE: 141 MG/DL (ref 70–110)
POCT GLUCOSE: 75 MG/DL (ref 70–110)
POCT GLUCOSE: 85 MG/DL (ref 70–110)
POCT GLUCOSE: 95 MG/DL (ref 70–110)
POTASSIUM SERPL-SCNC: 4.3 MMOL/L (ref 3.5–5.1)
PROT SERPL-MCNC: 6.8 G/DL (ref 6–8.4)
RBC # BLD AUTO: 2.98 M/UL (ref 4–5.4)
SODIUM SERPL-SCNC: 140 MMOL/L (ref 136–145)
WBC # BLD AUTO: 5.12 K/UL (ref 3.9–12.7)

## 2019-11-23 PROCEDURE — 82962 GLUCOSE BLOOD TEST: CPT

## 2019-11-23 PROCEDURE — 99291 CRITICAL CARE FIRST HOUR: CPT | Mod: 25

## 2019-11-23 PROCEDURE — 36415 COLL VENOUS BLD VENIPUNCTURE: CPT

## 2019-11-23 PROCEDURE — 83735 ASSAY OF MAGNESIUM: CPT

## 2019-11-23 PROCEDURE — 96360 HYDRATION IV INFUSION INIT: CPT

## 2019-11-23 PROCEDURE — 85025 COMPLETE CBC W/AUTO DIFF WBC: CPT

## 2019-11-23 PROCEDURE — 25000003 PHARM REV CODE 250: Performed by: HOSPITALIST

## 2019-11-23 PROCEDURE — G0378 HOSPITAL OBSERVATION PER HR: HCPCS

## 2019-11-23 PROCEDURE — 63600175 PHARM REV CODE 636 W HCPCS: Performed by: EMERGENCY MEDICINE

## 2019-11-23 PROCEDURE — 96361 HYDRATE IV INFUSION ADD-ON: CPT

## 2019-11-23 PROCEDURE — 80053 COMPREHEN METABOLIC PANEL: CPT

## 2019-11-23 RX ORDER — LABETALOL 200 MG/1
200 TABLET, FILM COATED ORAL 2 TIMES DAILY
Status: DISCONTINUED | OUTPATIENT
Start: 2019-11-23 | End: 2019-11-25 | Stop reason: HOSPADM

## 2019-11-23 RX ORDER — DEXTROSE MONOHYDRATE 50 MG/ML
1000 INJECTION, SOLUTION INTRAVENOUS
Status: COMPLETED | OUTPATIENT
Start: 2019-11-23 | End: 2019-11-23

## 2019-11-23 RX ORDER — INSULIN ASPART 100 [IU]/ML
0-5 INJECTION, SOLUTION INTRAVENOUS; SUBCUTANEOUS
Status: DISCONTINUED | OUTPATIENT
Start: 2019-11-23 | End: 2019-11-25 | Stop reason: HOSPADM

## 2019-11-23 RX ORDER — PRAVASTATIN SODIUM 40 MG/1
40 TABLET ORAL NIGHTLY
Status: DISCONTINUED | OUTPATIENT
Start: 2019-11-23 | End: 2019-11-25 | Stop reason: HOSPADM

## 2019-11-23 RX ORDER — DOCUSATE SODIUM 100 MG/1
100 CAPSULE, LIQUID FILLED ORAL 2 TIMES DAILY
Status: DISCONTINUED | OUTPATIENT
Start: 2019-11-23 | End: 2019-11-25

## 2019-11-23 RX ORDER — IBUPROFEN 200 MG
24 TABLET ORAL
Status: DISCONTINUED | OUTPATIENT
Start: 2019-11-23 | End: 2019-11-25 | Stop reason: HOSPADM

## 2019-11-23 RX ORDER — TALC
6 POWDER (GRAM) TOPICAL NIGHTLY PRN
Status: DISCONTINUED | OUTPATIENT
Start: 2019-11-23 | End: 2019-11-25

## 2019-11-23 RX ORDER — SEVELAMER CARBONATE 800 MG/1
800 TABLET, FILM COATED ORAL 2 TIMES DAILY WITH MEALS
Status: DISCONTINUED | OUTPATIENT
Start: 2019-11-23 | End: 2019-11-25 | Stop reason: HOSPADM

## 2019-11-23 RX ORDER — ONDANSETRON 4 MG/1
4 TABLET, ORALLY DISINTEGRATING ORAL EVERY 12 HOURS PRN
Status: DISCONTINUED | OUTPATIENT
Start: 2019-11-23 | End: 2019-11-25

## 2019-11-23 RX ORDER — DOCUSATE SODIUM 100 MG/1
100 CAPSULE, LIQUID FILLED ORAL 2 TIMES DAILY
COMMUNITY

## 2019-11-23 RX ORDER — CALCITRIOL 0.5 UG/1
0.5 CAPSULE ORAL DAILY
COMMUNITY

## 2019-11-23 RX ORDER — CETIRIZINE HYDROCHLORIDE 10 MG/1
10 TABLET ORAL DAILY
Status: DISCONTINUED | OUTPATIENT
Start: 2019-11-24 | End: 2019-11-25 | Stop reason: HOSPADM

## 2019-11-23 RX ORDER — DEXTROSE MONOHYDRATE 100 MG/ML
INJECTION, SOLUTION INTRAVENOUS CONTINUOUS
Status: DISCONTINUED | OUTPATIENT
Start: 2019-11-23 | End: 2019-11-24

## 2019-11-23 RX ORDER — IBUPROFEN 200 MG
16 TABLET ORAL
Status: DISCONTINUED | OUTPATIENT
Start: 2019-11-23 | End: 2019-11-25 | Stop reason: HOSPADM

## 2019-11-23 RX ORDER — CYCLOBENZAPRINE HCL 10 MG
10 TABLET ORAL 3 TIMES DAILY PRN
Status: DISCONTINUED | OUTPATIENT
Start: 2019-11-23 | End: 2019-11-25

## 2019-11-23 RX ORDER — GLUCAGON 1 MG
1 KIT INJECTION
Status: DISCONTINUED | OUTPATIENT
Start: 2019-11-23 | End: 2019-11-25 | Stop reason: HOSPADM

## 2019-11-23 RX ORDER — ONDANSETRON 2 MG/ML
4 INJECTION INTRAMUSCULAR; INTRAVENOUS EVERY 8 HOURS PRN
Status: DISCONTINUED | OUTPATIENT
Start: 2019-11-23 | End: 2019-11-25

## 2019-11-23 RX ORDER — CYCLOBENZAPRINE HCL 10 MG
10 TABLET ORAL 3 TIMES DAILY PRN
COMMUNITY

## 2019-11-23 RX ORDER — GABAPENTIN 100 MG/1
100 CAPSULE ORAL 2 TIMES DAILY
Status: DISCONTINUED | OUTPATIENT
Start: 2019-11-23 | End: 2019-11-25 | Stop reason: HOSPADM

## 2019-11-23 RX ORDER — ACETAMINOPHEN 325 MG/1
650 TABLET ORAL EVERY 4 HOURS PRN
Status: DISCONTINUED | OUTPATIENT
Start: 2019-11-23 | End: 2019-11-25 | Stop reason: HOSPADM

## 2019-11-23 RX ORDER — PANTOPRAZOLE SODIUM 40 MG/1
40 TABLET, DELAYED RELEASE ORAL DAILY
Status: DISCONTINUED | OUTPATIENT
Start: 2019-11-24 | End: 2019-11-25 | Stop reason: HOSPADM

## 2019-11-23 RX ORDER — AMOXICILLIN 250 MG
1 CAPSULE ORAL 2 TIMES DAILY
Status: DISCONTINUED | OUTPATIENT
Start: 2019-11-23 | End: 2019-11-23 | Stop reason: SDUPTHER

## 2019-11-23 RX ADMIN — DEXTROSE 1000 ML: 5 SOLUTION INTRAVENOUS at 05:11

## 2019-11-23 RX ADMIN — GABAPENTIN 100 MG: 100 CAPSULE ORAL at 09:11

## 2019-11-23 RX ADMIN — SEVELAMER CARBONATE 800 MG: 800 TABLET, FILM COATED ORAL at 09:11

## 2019-11-23 RX ADMIN — DOCUSATE SODIUM 100 MG: 100 CAPSULE, LIQUID FILLED ORAL at 10:11

## 2019-11-23 RX ADMIN — PRAVASTATIN SODIUM 40 MG: 40 TABLET ORAL at 09:11

## 2019-11-23 RX ADMIN — DEXTROSE: 10 SOLUTION INTRAVENOUS at 08:11

## 2019-11-23 RX ADMIN — LABETALOL HYDROCHLORIDE 200 MG: 200 TABLET, FILM COATED ORAL at 09:11

## 2019-11-23 NOTE — ED PROVIDER NOTES
"Encounter Date: 2019    SCRIBE #1 NOTE: I, Deandre Dueñas, am scribing for, and in the presence of, Carlos Morin MD.       History   No chief complaint on file.      Time seen by provider: 3:35 PM on 2019    Brandi Camacho is a 79 y.o. female with PMHx of CHF, DM, cancer, A-fib, GERD, Anemia, HTN, and renal disorder who presents to the ED via EMS to be checked after "unresponsive behavior" beginning approximately x2 hours PTA secondary to low blood sugar. EMS states upon arrival the patient was "unresponsive" and had trouble breathing. The blood sugar device outputted an error message because it was so low, and the patient was given an IO for sugar. The EMS adds that the patient has had a recent right hip fracture x1 month ago, and has been wheelchair or bed bound. The EMS concludes that her GCS was at 15 during the transport to the ED and at the ED. The patient had her normal insulin medicine this morning, but did not eat as reported by the family. The family explains to EMS personel that she hasn't eaten with her insulin medicine before. The patient denies chest pain, abdominal pain, or any other symptoms at this time. Pertinent PSHx includes , endoscopic precancerous adenomatous, left mastectomy, and AV fistula placement.      The history is provided by the patient and the EMS personnel.     Review of patient's allergies indicates:   Allergen Reactions    Metformin Rash and Nausea And Vomiting     Other reaction(s): Unknown    Sulfamethoxazole-trimethoprim Rash    Sulfamethoxazole Rash     Other reaction(s): Rash  Other reaction(s): Rash    Trimethoprim Rash     Other reaction(s): Rash  Other reaction(s): Rash     Past Medical History:   Diagnosis Date    Anemia     sees Dr. Loyola and Dr. Turner    Anticoagulant long-term use     aspirin    Arthritis     Cancer     left breast ca    Diabetes mellitus     Encounter for blood transfusion     GERD (gastroesophageal reflux disease)  " "   Hypertension     sees Dr. Farmer    Renal disorder     CRF on hemodialysis MWF    Urinary tract infection      Past Surgical History:   Procedure Laterality Date    AV FISTULA PLACEMENT Right     BREAST SURGERY  2011    left mastectomy [facility] dr begum     SECTION      x2    endoscopic precancerous adenomatous      [facility] dr colvin, ampulla of vater    HYSTERECTOMY      EDI    left knee patella FRACTURE      MODIFIED RADICAL MASTECTOMY W/ AXILLARY LYMPH NODE DISSECTION      right knee orthoscope       Family History   Problem Relation Age of Onset    Cancer Sister     Cancer Mother         pancreatic    Diabetes Mother     Hearing loss Mother     Cancer Father         bladder    Diabetes Father     Cancer Sister         half sister, breast     Social History     Tobacco Use    Smoking status: Never Smoker    Smokeless tobacco: Never Used   Substance Use Topics    Alcohol use: No    Drug use: No     Review of Systems   Constitutional: Negative for fever.   HENT: Negative for sore throat.    Respiratory: Positive for shortness of breath.    Cardiovascular: Negative for chest pain.   Gastrointestinal: Negative for abdominal pain and nausea.   Genitourinary: Negative for dysuria.   Musculoskeletal: Negative for back pain.   Skin: Negative for rash.   Neurological: Positive for syncope ("unresponsive"). Negative for weakness.   Hematological: Does not bruise/bleed easily.       Physical Exam     Initial Vitals   BP Pulse Resp Temp SpO2   -- -- -- -- --      MAP       --         Physical Exam    Nursing note and vitals reviewed.  Constitutional: She appears well-developed and well-nourished. She is not diaphoretic. No distress.   HENT:   Head: Normocephalic and atraumatic.   Dry mucous membranes.   Eyes: EOM are normal. Pupils are equal, round, and reactive to light.   Neck: Normal range of motion. Neck supple.   Cardiovascular: Normal rate, regular rhythm, normal heart sounds " and intact distal pulses. Exam reveals no gallop and no friction rub.    No murmur heard.  Pulmonary/Chest: Breath sounds normal. No respiratory distress. She has no wheezes. She has no rhonchi. She has no rales.   Abdominal: Soft. Bowel sounds are normal. There is no tenderness. There is no rebound and no guarding.   Musculoskeletal: Normal range of motion.   Neurological: She is alert and oriented to person, place, and time.   Skin: Skin is warm and dry.   Dialysis shunt on the right upper arm. Good thrill and bruit. Left side mastectomy noted.    Psychiatric: She has a normal mood and affect. Her behavior is normal. Judgment and thought content normal.         ED Course   Critical Care  Performed by: Carlos Morin MD  Authorized by: Carlos Morin MD   Direct patient critical care time: 15 minutes  Additional history critical care time: 9 minutes  Ordering / reviewing critical care time: 10 minutes  Documentation critical care time: 11 minutes  Consulting other physicians critical care time: 6 minutes  Total critical care time (exclusive of procedural time) : 51 minutes  Critical care was time spent personally by me on the following activities: ordering and performing treatments and interventions, development of treatment plan with patient or surrogate, examination of patient, obtaining history from patient or surrogate, ordering and review of laboratory studies and evaluation of patient's response to treatment.        Labs Reviewed - No data to display       Imaging Results    None          Medical Decision Making:   Initial Assessment:   79-year-old female presented with altered mental status.  Differential Diagnosis:   My differential diagnosis includes medication reaction, dietary non-compliance, and medication overdose.  Clinical Tests:   Lab Tests: Ordered and Reviewed  ED Management:  The patient was emergently evaluated in the emergency department, her evaluation was significant for an elderly  female who is awake and alert at present.  The patient did have a severe episode of hypoglycemia prior to arrival, which has improved with treatment prior to arrival by EMS the patient was aggressively treated with further IV dextrose as well as p.o. foods, with some improvement in her symptoms, however she did have a recurrence in her hypoglycemia that required the patient being placed on an IV dextrose drip.  The patient's labs showed no acute abnormalities.  Because the patient is requiring IV dextrose, I will admit her to the hospitalist service.  I have discussed the case with the hospitalist on call, Dr. Joaquin.  She has accepted the patient for admission.  Other:   I have discussed this case with another health care provider.            Scribe Attestation:   Scribe #1: I performed the above scribed service and the documentation accurately describes the services I performed. I attest to the accuracy of the note.    Attending Attestation:             Attending ED Notes:   6:21 pm Spoke to Dr. Joaquin about admission.             I, Dr. Carlos Morin, personally performed the services described in this documentation. All medical record entries made by the scribe were at my direction and in my presence.  I have reviewed the chart and agree that the record reflects my personal performance and is accurate and complete. Carlos Morin MD.  11:05 PM 11/23/2019             Clinical Impression:       ICD-10-CM ICD-9-CM   1. Hypoglycemia E16.2 251.2         Disposition:   Disposition: Admitted                     Carlos Morin MD  11/23/19 1239

## 2019-11-24 LAB
ANION GAP SERPL CALC-SCNC: 10 MMOL/L (ref 8–16)
BUN SERPL-MCNC: 44 MG/DL (ref 8–23)
CALCIUM SERPL-MCNC: 8.6 MG/DL (ref 8.7–10.5)
CHLORIDE SERPL-SCNC: 102 MMOL/L (ref 95–110)
CO2 SERPL-SCNC: 24 MMOL/L (ref 23–29)
CREAT SERPL-MCNC: 4.6 MG/DL (ref 0.5–1.4)
EST. GFR  (AFRICAN AMERICAN): 10 ML/MIN/1.73 M^2
EST. GFR  (NON AFRICAN AMERICAN): 8 ML/MIN/1.73 M^2
ESTIMATED AVG GLUCOSE: 126 MG/DL (ref 68–131)
GLUCOSE SERPL-MCNC: 168 MG/DL (ref 70–110)
HBA1C MFR BLD HPLC: 6 % (ref 4–5.6)
MAGNESIUM SERPL-MCNC: 2 MG/DL (ref 1.6–2.6)
PHOSPHATE SERPL-MCNC: 4.2 MG/DL (ref 2.7–4.5)
POCT GLUCOSE: 128 MG/DL (ref 70–110)
POCT GLUCOSE: 136 MG/DL (ref 70–110)
POCT GLUCOSE: 182 MG/DL (ref 70–110)
POCT GLUCOSE: 214 MG/DL (ref 70–110)
POCT GLUCOSE: 231 MG/DL (ref 70–110)
POCT GLUCOSE: 267 MG/DL (ref 70–110)
POTASSIUM SERPL-SCNC: 4.7 MMOL/L (ref 3.5–5.1)
SODIUM SERPL-SCNC: 136 MMOL/L (ref 136–145)

## 2019-11-24 PROCEDURE — 25000003 PHARM REV CODE 250: Performed by: HOSPITALIST

## 2019-11-24 PROCEDURE — 84100 ASSAY OF PHOSPHORUS: CPT

## 2019-11-24 PROCEDURE — 96361 HYDRATE IV INFUSION ADD-ON: CPT

## 2019-11-24 PROCEDURE — 83036 HEMOGLOBIN GLYCOSYLATED A1C: CPT

## 2019-11-24 PROCEDURE — G0378 HOSPITAL OBSERVATION PER HR: HCPCS

## 2019-11-24 PROCEDURE — 36415 COLL VENOUS BLD VENIPUNCTURE: CPT

## 2019-11-24 PROCEDURE — 83735 ASSAY OF MAGNESIUM: CPT

## 2019-11-24 PROCEDURE — 94761 N-INVAS EAR/PLS OXIMETRY MLT: CPT

## 2019-11-24 PROCEDURE — 63600175 PHARM REV CODE 636 W HCPCS: Performed by: HOSPITALIST

## 2019-11-24 PROCEDURE — 96372 THER/PROPH/DIAG INJ SC/IM: CPT | Mod: 59

## 2019-11-24 PROCEDURE — 25000003 PHARM REV CODE 250: Performed by: NURSE PRACTITIONER

## 2019-11-24 PROCEDURE — 80048 BASIC METABOLIC PNL TOTAL CA: CPT

## 2019-11-24 RX ORDER — MUPIROCIN 20 MG/G
OINTMENT TOPICAL 2 TIMES DAILY
Status: DISCONTINUED | OUTPATIENT
Start: 2019-11-24 | End: 2019-11-25 | Stop reason: HOSPADM

## 2019-11-24 RX ORDER — HYDROCODONE BITARTRATE AND ACETAMINOPHEN 5; 325 MG/1; MG/1
1 TABLET ORAL EVERY 6 HOURS PRN
Status: DISCONTINUED | OUTPATIENT
Start: 2019-11-24 | End: 2019-11-25 | Stop reason: HOSPADM

## 2019-11-24 RX ORDER — SODIUM CHLORIDE 9 MG/ML
INJECTION, SOLUTION INTRAVENOUS ONCE
Status: CANCELLED | OUTPATIENT
Start: 2019-11-24 | End: 2019-11-24

## 2019-11-24 RX ADMIN — SEVELAMER CARBONATE 800 MG: 800 TABLET, FILM COATED ORAL at 06:11

## 2019-11-24 RX ADMIN — HYDROCODONE BITARTRATE AND ACETAMINOPHEN 1 TABLET: 5; 325 TABLET ORAL at 11:11

## 2019-11-24 RX ADMIN — HYDROCODONE BITARTRATE AND ACETAMINOPHEN 1 TABLET: 5; 325 TABLET ORAL at 08:11

## 2019-11-24 RX ADMIN — DOCUSATE SODIUM 100 MG: 100 CAPSULE, LIQUID FILLED ORAL at 11:11

## 2019-11-24 RX ADMIN — MUPIROCIN: 20 OINTMENT TOPICAL at 08:11

## 2019-11-24 RX ADMIN — LABETALOL HYDROCHLORIDE 200 MG: 200 TABLET, FILM COATED ORAL at 11:11

## 2019-11-24 RX ADMIN — DOCUSATE SODIUM 100 MG: 100 CAPSULE, LIQUID FILLED ORAL at 08:11

## 2019-11-24 RX ADMIN — ACETAMINOPHEN 650 MG: 325 TABLET ORAL at 03:11

## 2019-11-24 RX ADMIN — SEVELAMER CARBONATE 800 MG: 800 TABLET, FILM COATED ORAL at 11:11

## 2019-11-24 RX ADMIN — CYCLOBENZAPRINE HYDROCHLORIDE 10 MG: 10 TABLET, FILM COATED ORAL at 12:11

## 2019-11-24 RX ADMIN — MUPIROCIN: 20 OINTMENT TOPICAL at 11:11

## 2019-11-24 RX ADMIN — CETIRIZINE HYDROCHLORIDE 10 MG: 10 TABLET, FILM COATED ORAL at 11:11

## 2019-11-24 RX ADMIN — PANTOPRAZOLE SODIUM 40 MG: 40 TABLET, DELAYED RELEASE ORAL at 11:11

## 2019-11-24 RX ADMIN — GABAPENTIN 100 MG: 100 CAPSULE ORAL at 11:11

## 2019-11-24 RX ADMIN — INSULIN ASPART 1 UNITS: 100 INJECTION, SOLUTION INTRAVENOUS; SUBCUTANEOUS at 08:11

## 2019-11-24 RX ADMIN — PRAVASTATIN SODIUM 40 MG: 40 TABLET ORAL at 08:11

## 2019-11-24 RX ADMIN — LABETALOL HYDROCHLORIDE 200 MG: 200 TABLET, FILM COATED ORAL at 08:11

## 2019-11-24 RX ADMIN — GABAPENTIN 100 MG: 100 CAPSULE ORAL at 08:11

## 2019-11-24 NOTE — PLAN OF CARE
11/24/19 1405   CHAVEZ Message   Medicare Outpatient and Observation Notification regarding financial responsibility Given to patient/caregiver;Explained to patient/caregiver;Signed/date by patient/caregiver   Date CHAVEZ was signed 11/24/19   Time CHAVEZ was signed 0932

## 2019-11-24 NOTE — PLAN OF CARE
11/24/19 0449   Patient Assessment/Suction   Level of Consciousness (AVPU) responds to voice   PRE-TX-O2   O2 Device (Oxygen Therapy) room air   SpO2 97 %   Pulse Oximetry Type Intermittent

## 2019-11-24 NOTE — ED NOTES
Assumed care:  Brandi Camacho is awake, alert and oriented x 3, skin warm and dry, in NAD with family at bedside.

## 2019-11-24 NOTE — H&P
"[facility] Medicine  History & Physical    Patient Name: Brandi Camacho  MRN: [id number]  Admission Date: 2019  Attending Physician: Carlos Morin MD   Primary Care Provider: Greg Skinner Iv, MD         Patient information was obtained from patient, relative(s), past medical records and ER records.     Subjective:     Principal Problem:Hypoglycemia associated with type 2 diabetes mellitus    Chief Complaint:   Chief Complaint   Patient presents with    Hypoglycemia        HPI: Brandi Camacho is a 79 y.o. female with PMHx of CHF, DM, cancer, A-fib, GERD, Anemia, HTN, and renal disorder who presents to the ED via EMS to be checked after "unresponsive behavior" beginning approximately x2 hours PTA secondary to low blood sugar. EMS states upon arrival the patient was "unresponsive" and had trouble breathing. The blood sugar device outputted an error message because it was so low, and the patient was given an IO for sugar. The EMS adds that the patient has had a recent right hip fracture x1 month ago, and has been wheelchair or bed bound. The EMS concludes that her GCS was at 15 during the transport to the ED and at the ED. The patient had her normal insulin medicine this morning, but did not eat as reported by the family. The family explains to EMS personel that she hasn't eaten with her insulin medicine before. The patient denies fever chills, cough, chest pain, abdominal pain,  but she did have vomiting per her daughter.  Patient does not recall vomiting.  She is not aware of any exposures to other illnesses.  She did not have dysuria or diarrhea.  Pertinent PSHx includes , endoscopic precancerous adenomatous, left mastectomy, and AV fistula placement.     Blood sugar in ED was 95 and decreased to 75 in 1 hour so patient admitted for hypoglycemia.  She was awake and alert and able to tolerate p.o. in the emergency room.  Daughter present in the ED.  Patient is " discharged from rehab after pelvic fracture 4 days prior.  Appears that there was an increase in her insulin dosage possibly at the rehab.    Past Medical History:   Diagnosis Date    Anemia     sees Dr. Loyola and Dr. Turner    Anticoagulant long-term use     aspirin    Arthritis     Cancer     left breast ca    Diabetes mellitus     Encounter for blood transfusion     GERD (gastroesophageal reflux disease)     Hypertension     sees Dr. Farmer    Renal disorder     [facility] on hemodialysis MWF    Urinary tract infection        Past Surgical History:   Procedure Laterality Date    AV FISTULA PLACEMENT Right     BREAST SURGERY  2011    left mastectomy [facility] dr begum     SECTION      x2    endoscopic precancerous adenomatous      [facility] dr colvin, ampulla of vater    HYSTERECTOMY      EDI    left knee patella FRACTURE      MODIFIED RADICAL MASTECTOMY W/ AXILLARY LYMPH NODE DISSECTION      right knee orthoscope         Review of patient's allergies indicates:   Allergen Reactions    Metformin Rash and Nausea And Vomiting     Other reaction(s): Unknown    Sulfamethoxazole-trimethoprim Rash    Sulfamethoxazole Rash     Other reaction(s): Rash  Other reaction(s): Rash    Trimethoprim Rash     Other reaction(s): Rash  Other reaction(s): Rash       No current facility-administered medications on file prior to encounter.      Current Outpatient Medications on File Prior to Encounter   Medication Sig    calcitRIOL (ROCALTROL) 0.5 MCG Cap Take 0.5 mcg by mouth once daily.    cyanocobalamin/folic ac/vit B6 (FOLBIC ORAL) Take by mouth every evening.    cyclobenzaprine (FLEXERIL) 10 MG tablet Take 10 mg by mouth 3 (three) times daily as needed for Muscle spasms.    docusate sodium (COLACE) 100 MG capsule Take 100 mg by mouth 2 (two) times daily.    cetirizine (ZYRTEC) 10 MG tablet Take 10 mg by mouth once daily.    furosemide (LASIX) 20 MG tablet Take 20 mg by mouth 2 (two) times  daily.     gabapentin (NEURONTIN) 100 MG capsule Take 100 mg by mouth 2 (two) times daily.     HYDROcodone-acetaminophen (NORCO) 5-325 mg per tablet Take 1 tablet by mouth every 8 (eight) hours as needed for Pain. (Patient taking differently: Take 1 tablet by mouth every 8 (eight) hours as needed for Pain. Take for 8 days.)    insulin NPH-insulin regular, 70/30, (NOVOLIN 70/30 U-100 INSULIN) 100 unit/mL (70-30) injection Inject 20 Units into the skin once daily. AND 10 UNITS QHS (Patient taking differently: Inject 30 Units into the skin every morning. )    insulin NPH-insulin regular, 70/30, (NOVOLIN 70/30) 100 unit/mL (70-30) injection Inject 12 Units into the skin every evening. AND 30 UNITS QAM (Patient taking differently: Inject 10 Units into the skin every evening. )    labetalol (NORMODYNE) 200 MG tablet Take 200 mg by mouth 2 (two) times daily.    omeprazole (PRILOSEC) 40 MG capsule Take 40 mg by mouth once daily.    ondansetron (ZOFRAN-ODT) 4 MG TbDL Take 4 mg by mouth every 12 (twelve) hours as needed (nausea). Take for 15 days.    pravastatin (PRAVACHOL) 40 MG tablet Take 40 mg by mouth every evening.     promethazine (PHENERGAN) 12.5 MG Tab Take 12.5 mg by mouth every 8 (eight) hours as needed (nausea). Take for 10 days.    ranitidine (ZANTAC) 150 MG tablet Take 150 mg by mouth 2 (two) times daily.     sevelamer carbonate (RENVELA) 800 mg Tab Take 1,600 mg by mouth daily with dinner or evening meal.     sevelamer carbonate (RENVELA) 800 mg Tab Take 800 mg by mouth 2 (two) times daily with meals. 800 mg bid with smaller meals and 1600 mg qd with largest meal    vit B comp no.3/folic/C/biotin (TOM-ANA RX ORAL) Take 1 tablet by mouth once daily.     Family History     Problem Relation (Age of Onset)    Cancer Sister, Mother, Father, Sister    Diabetes Mother, Father    Hearing loss Mother        Tobacco Use    Smoking status: Never Smoker    Smokeless tobacco: Never Used   Substance and  Sexual Activity    Alcohol use: No    Drug use: No    Sexual activity: Not on file     Review of Systems   Constitutional: Negative for appetite change and fever.   HENT: Negative for congestion, dental problem and sore throat.    Eyes: Negative for discharge and itching.   Respiratory: Negative for cough and chest tightness.    Cardiovascular: Negative for chest pain, palpitations and leg swelling.   Gastrointestinal: Positive for nausea and vomiting. Negative for abdominal distention, abdominal pain and diarrhea.   Endocrine: Negative for cold intolerance and heat intolerance.   Genitourinary: Negative for difficulty urinating and dysuria.   Musculoskeletal: Positive for arthralgias and gait problem (Recent pelvic fracture.  Wheelchair bound.). Negative for back pain.   Skin: Negative for color change and pallor.        Fistula right upper extremity   Allergic/Immunologic: Negative for environmental allergies and food allergies.   Neurological: Positive for dizziness and tremors. Negative for light-headedness and headaches.   Hematological: Negative for adenopathy. Does not bruise/bleed easily.   Psychiatric/Behavioral: Positive for confusion. Negative for agitation and behavioral problems.     Objective:     Vital Signs (Most Recent):  Pulse: 71 (11/23/19 1756)  Resp: 20 (11/23/19 1541)  BP: (!) 180/84 (11/23/19 1732)  SpO2: 100 % (11/23/19 1756) Vital Signs (24h Range):  Pulse:  [62-77] 71  Resp:  [20] 20  SpO2:  [91 %-100 %] 100 %  BP: (120-180)/(67-87) 180/84     Weight: 87.1 kg (192 lb)  Body mass index is 31.95 kg/m².    Physical Exam   Constitutional: She is oriented to person, place, and time. She appears well-developed and well-nourished. No distress.   HENT:   Head: Normocephalic and atraumatic.   Right Ear: External ear normal.   Left Ear: External ear normal.   Nose: Nose normal.   Mouth/Throat: Oropharynx is clear and moist. No oropharyngeal exudate.   Eyes: Conjunctivae and EOM are normal. Right  eye exhibits no discharge. Left eye exhibits no discharge. No scleral icterus.   Neck: Normal range of motion. Neck supple. No thyromegaly present.   Cardiovascular: Normal rate, regular rhythm, normal heart sounds and intact distal pulses. Exam reveals no gallop and no friction rub.   No murmur heard.  Pulmonary/Chest: Effort normal and breath sounds normal. No respiratory distress. She has no wheezes.   Abdominal: Soft. Bowel sounds are normal. She exhibits no distension and no mass. There is no tenderness. There is no rebound and no guarding.   Musculoskeletal: Normal range of motion. She exhibits no edema or tenderness.   Fistula with bruit and thrill right upper extremity upper arm.   Lymphadenopathy:     She has no cervical adenopathy.   Neurological: She is alert and oriented to person, place, and time. No cranial nerve deficit. Coordination normal.   Skin: Skin is warm and dry. Capillary refill takes less than 2 seconds. No rash noted. No erythema. There is pallor.   Psychiatric: She has a normal mood and affect. Her behavior is normal. Judgment and thought content normal.   Nursing note and vitals reviewed.        CRANIAL NERVES     CN III, IV, VI   Extraocular motions are normal.        Significant Labs:   CBC:   Recent Labs   Lab 11/23/19  1643   WBC 5.12   HGB 9.9*   HCT 31.3*   *     CMP:   Recent Labs   Lab 11/23/19  1643      K 4.3      CO2 26   GLU 77   BUN 24*   CREATININE 4.1*   CALCIUM 9.3   PROT 6.8   ALBUMIN 2.9*   BILITOT 0.6   ALKPHOS 171*   AST 32   ALT 22   ANIONGAP 9   EGFRNONAA 10*       Significant Imaging: I have reviewed and interpreted all pertinent imaging results/findings within the past 24 hours.    Assessment/Plan:     Anemia of chronic disease  Acute controlled --due to end-stage renal disease.  Use of Epogen as per Nephrology  Hemoglobin   Date Value Ref Range Status   11/23/2019 9.9 (L) 12.0 - 16.0 g/dL Final      Hematocrit   Date Value Ref Range Status    11/23/2019 31.3 (L) 37.0 - 48.5 % Final      Transfuse if patient becomes hemodynamically unstable and symptomatic or H/H  7/21   Monitor hgb status       Hypoglycemia  Acute and uncontrolled   likely due to insulin usage without oral intake and possibly recent dose increase.  Check Point of care glucose every 2 hr.  And continue D5 at 100 cc an hour started in the ED and adjust rate to maintain point of care glucose less than 150.  Patient also tolerating oral intake at this time.  Monitor overnight.  Diabetic diet ordered      Hypoglycemia associated with type 2 diabetes mellitus  CC hypoglycemia      ESRD (end stage renal disease)  Chronic and stable.  Consult Nephrology for hemodialysis orders.  Renal dose her medications      Type 2 diabetes mellitus with kidney complication, with long-term current use of insulin    Chronic and unstable due to hypoglycemia.  Hold any oral medications and adjust insulin as noted above.  Consult dietitian and diabetic education.    Hypertension  Chronic and uncontrolled  .  Will continue BP medications as needed for sustained BP control.  Will utilize p.r.n. blood pressure medication only if patient's blood pressure greater than  180/110 and  develops symptoms such as worsening chest pain or shortness of breath.  Hypertension Medications             furosemide (LASIX) 20 MG tablet Take 20 mg by mouth 2 (two) times daily.     labetalol (NORMODYNE) 200 MG tablet Take 200 mg by mouth 2 (two) times daily.                  VTE Risk Mitigation (From admission, onward)    None             Roro Joaquin MD  Department of Hospital Medicine   [facility]

## 2019-11-24 NOTE — UM SECONDARY REVIEW
Physician Advisor External    Level of Care Issue    Approved Observation/outpt for 11/24/19 per ehr md review..

## 2019-11-24 NOTE — UM SECONDARY REVIEW
Physician Advisor External    Level of Care Issue  11/23/19 per ehr md review.   Approved Observation/outpt for

## 2019-11-24 NOTE — PLAN OF CARE
Spoke with the pt and her  at the bedside to complete assessment; the pt was recently discharged from [facility] after being sent there from this hospital. Per the  she was walking when she left and [facility] has been walking her at home with her RW. Their daughter, Kaia (ph#[phone]) has been assisting with the pt's care at home also when needed.   The patient plans to return home with resumption of [facility] at the time of discharge; Patient Choice/Disclosure signed for [facility]....LEATHA Paz       11/24/19 1043   Discharge Assessment   Assessment Type Discharge Planning Assessment   Confirmed/corrected address and phone number on facesheet? Yes   Assessment information obtained from? Patient   Expected Length of Stay (days) 2   Communicated expected length of stay with patient/caregiver yes   Prior to hospitilization cognitive status: Alert/Oriented   Prior to hospitalization functional status: Needs Assistance;Partially Dependent   Current cognitive status: Alert/Oriented   Current Functional Status: Partially Dependent;Needs Assistance   Lives With spouse   Able to Return to Prior Arrangements   (unable to determine at this time)   Is patient able to care for self after discharge? Unable to determine at this time (comments)   Readmission Within the Last 30 Days other (see comments)  (patient was discharged from [facility] on Tuesday, 11/19/19)   Patient currently being followed by outpatient case management? No   Patient currently receives any other outside agency services? Yes   Name and contact number of agency or person providing outside services [facility]    Is it the patient/care giver preference to resume care with the current outside agency? Yes   Equipment Currently Used at Home walker, rolling;wheelchair;glucometer;shower chair   Do you have any problems affording any of your prescribed medications? No   Is the patient taking medications as prescribed? yes   Does the  patient have transportation home? Yes   Transportation Anticipated family or friend will provide   Does the patient receive services at the [facility]? No   Discharge Plan A Home Health   DME Needed Upon Discharge  none   Patient/Family in Agreement with Plan yes

## 2019-11-24 NOTE — PLAN OF CARE
Pt resting in bed. VSS. NAD.  SOB noted  upon activity. O2 at 2LPM. D10 infusing  at 75 ml/hr. Blood sugar monitored q 2hrs. Tylenol given for mild pain per  pt request. AAO x4. Pt follow commands  and is able  to make needs known.  at bedside. POC reviewed. Safety maintained.  Bed low and locked, alarm on, call light in reach. Will continue to monitor.

## 2019-11-24 NOTE — ASSESSMENT & PLAN NOTE
Chronic and unstable due to hypoglycemia.  Hold any oral medications and adjust insulin as noted above.  Consult dietitian and diabetic education.

## 2019-11-24 NOTE — PT/OT/SLP PROGRESS
Physical Therapy      Patient Name:  Brandi Camacho   MRN:  [id number]    Patient not seen today secondary to Nursing care, Other (Comment).Hold PT per nurse. Will follow-up 11/25/19.    Deshaun Sommers, PT

## 2019-11-24 NOTE — ASSESSMENT & PLAN NOTE
Chronic and uncontrolled  .  Will continue BP medications as needed for sustained BP control.  Will utilize p.r.n. blood pressure medication only if patient's blood pressure greater than  180/110 and  develops symptoms such as worsening chest pain or shortness of breath.  Hypertension Medications             furosemide (LASIX) 20 MG tablet Take 20 mg by mouth 2 (two) times daily.     labetalol (NORMODYNE) 200 MG tablet Take 200 mg by mouth 2 (two) times daily.

## 2019-11-24 NOTE — SUBJECTIVE & OBJECTIVE
Past Medical History:   Diagnosis Date    Anemia     sees Dr. Loyola and Dr. Turner    Anticoagulant long-term use     aspirin    Arthritis     Cancer     left breast ca    Diabetes mellitus     Encounter for blood transfusion     GERD (gastroesophageal reflux disease)     Hypertension     sees Dr. Farmer    Renal disorder     [facility] on hemodialysis MWF    Urinary tract infection        Past Surgical History:   Procedure Laterality Date    AV FISTULA PLACEMENT Right     BREAST SURGERY  2011    left mastectomy [facility] dr begum     SECTION      x2    endoscopic precancerous adenomatous      [facility] dr colvin, ampulla of vater    HYSTERECTOMY      EDI    left knee patella FRACTURE      MODIFIED RADICAL MASTECTOMY W/ AXILLARY LYMPH NODE DISSECTION      right knee orthoscope         Review of patient's allergies indicates:   Allergen Reactions    Metformin Rash and Nausea And Vomiting     Other reaction(s): Unknown    Sulfamethoxazole-trimethoprim Rash    Sulfamethoxazole Rash     Other reaction(s): Rash  Other reaction(s): Rash    Trimethoprim Rash     Other reaction(s): Rash  Other reaction(s): Rash       No current facility-administered medications on file prior to encounter.      Current Outpatient Medications on File Prior to Encounter   Medication Sig    calcitRIOL (ROCALTROL) 0.5 MCG Cap Take 0.5 mcg by mouth once daily.    cyanocobalamin/folic ac/vit B6 (FOLBIC ORAL) Take by mouth every evening.    cyclobenzaprine (FLEXERIL) 10 MG tablet Take 10 mg by mouth 3 (three) times daily as needed for Muscle spasms.    docusate sodium (COLACE) 100 MG capsule Take 100 mg by mouth 2 (two) times daily.    cetirizine (ZYRTEC) 10 MG tablet Take 10 mg by mouth once daily.    furosemide (LASIX) 20 MG tablet Take 20 mg by mouth 2 (two) times daily.     gabapentin (NEURONTIN) 100 MG capsule Take 100 mg by mouth 2 (two) times daily.     HYDROcodone-acetaminophen (NORCO) 5-325 mg per  tablet Take 1 tablet by mouth every 8 (eight) hours as needed for Pain. (Patient taking differently: Take 1 tablet by mouth every 8 (eight) hours as needed for Pain. Take for 8 days.)    insulin NPH-insulin regular, 70/30, (NOVOLIN 70/30 U-100 INSULIN) 100 unit/mL (70-30) injection Inject 20 Units into the skin once daily. AND 10 UNITS QHS (Patient taking differently: Inject 30 Units into the skin every morning. )    insulin NPH-insulin regular, 70/30, (NOVOLIN 70/30) 100 unit/mL (70-30) injection Inject 12 Units into the skin every evening. AND 30 UNITS QAM (Patient taking differently: Inject 10 Units into the skin every evening. )    labetalol (NORMODYNE) 200 MG tablet Take 200 mg by mouth 2 (two) times daily.    omeprazole (PRILOSEC) 40 MG capsule Take 40 mg by mouth once daily.    ondansetron (ZOFRAN-ODT) 4 MG TbDL Take 4 mg by mouth every 12 (twelve) hours as needed (nausea). Take for 15 days.    pravastatin (PRAVACHOL) 40 MG tablet Take 40 mg by mouth every evening.     promethazine (PHENERGAN) 12.5 MG Tab Take 12.5 mg by mouth every 8 (eight) hours as needed (nausea). Take for 10 days.    ranitidine (ZANTAC) 150 MG tablet Take 150 mg by mouth 2 (two) times daily.     sevelamer carbonate (RENVELA) 800 mg Tab Take 1,600 mg by mouth daily with dinner or evening meal.     sevelamer carbonate (RENVELA) 800 mg Tab Take 800 mg by mouth 2 (two) times daily with meals. 800 mg bid with smaller meals and 1600 mg qd with largest meal    vit B comp no.3/folic/C/biotin (TOM-ANA RX ORAL) Take 1 tablet by mouth once daily.     Family History     Problem Relation (Age of Onset)    Cancer Sister, Mother, Father, Sister    Diabetes Mother, Father    Hearing loss Mother        Tobacco Use    Smoking status: Never Smoker    Smokeless tobacco: Never Used   Substance and Sexual Activity    Alcohol use: No    Drug use: No    Sexual activity: Not on file     Review of Systems   Constitutional: Negative for appetite  change and fever.   HENT: Negative for congestion, dental problem and sore throat.    Eyes: Negative for discharge and itching.   Respiratory: Negative for cough and chest tightness.    Cardiovascular: Negative for chest pain, palpitations and leg swelling.   Gastrointestinal: Positive for nausea and vomiting. Negative for abdominal distention, abdominal pain and diarrhea.   Endocrine: Negative for cold intolerance and heat intolerance.   Genitourinary: Negative for difficulty urinating and dysuria.   Musculoskeletal: Positive for arthralgias and gait problem (Recent pelvic fracture.  Wheelchair bound.). Negative for back pain.   Skin: Negative for color change and pallor.        Fistula right upper extremity   Allergic/Immunologic: Negative for environmental allergies and food allergies.   Neurological: Positive for dizziness and tremors. Negative for light-headedness and headaches.   Hematological: Negative for adenopathy. Does not bruise/bleed easily.   Psychiatric/Behavioral: Positive for confusion. Negative for agitation and behavioral problems.     Objective:     Vital Signs (Most Recent):  Pulse: 71 (11/23/19 1756)  Resp: 20 (11/23/19 1541)  BP: (!) 180/84 (11/23/19 1732)  SpO2: 100 % (11/23/19 1756) Vital Signs (24h Range):  Pulse:  [62-77] 71  Resp:  [20] 20  SpO2:  [91 %-100 %] 100 %  BP: (120-180)/(67-87) 180/84     Weight: 87.1 kg (192 lb)  Body mass index is 31.95 kg/m².    Physical Exam   Constitutional: She is oriented to person, place, and time. She appears well-developed and well-nourished. No distress.   HENT:   Head: Normocephalic and atraumatic.   Right Ear: External ear normal.   Left Ear: External ear normal.   Nose: Nose normal.   Mouth/Throat: Oropharynx is clear and moist. No oropharyngeal exudate.   Eyes: Conjunctivae and EOM are normal. Right eye exhibits no discharge. Left eye exhibits no discharge. No scleral icterus.   Neck: Normal range of motion. Neck supple. No thyromegaly present.    Cardiovascular: Normal rate, regular rhythm, normal heart sounds and intact distal pulses. Exam reveals no gallop and no friction rub.   No murmur heard.  Pulmonary/Chest: Effort normal and breath sounds normal. No respiratory distress. She has no wheezes.   Abdominal: Soft. Bowel sounds are normal. She exhibits no distension and no mass. There is no tenderness. There is no rebound and no guarding.   Musculoskeletal: Normal range of motion. She exhibits no edema or tenderness.   Fistula with bruit and thrill right upper extremity upper arm.   Lymphadenopathy:     She has no cervical adenopathy.   Neurological: She is alert and oriented to person, place, and time. No cranial nerve deficit. Coordination normal.   Skin: Skin is warm and dry. Capillary refill takes less than 2 seconds. No rash noted. No erythema. There is pallor.   Psychiatric: She has a normal mood and affect. Her behavior is normal. Judgment and thought content normal.   Nursing note and vitals reviewed.        CRANIAL NERVES     CN III, IV, VI   Extraocular motions are normal.        Significant Labs:   CBC:   Recent Labs   Lab 11/23/19  1643   WBC 5.12   HGB 9.9*   HCT 31.3*   *     CMP:   Recent Labs   Lab 11/23/19  1643      K 4.3      CO2 26   GLU 77   BUN 24*   CREATININE 4.1*   CALCIUM 9.3   PROT 6.8   ALBUMIN 2.9*   BILITOT 0.6   ALKPHOS 171*   AST 32   ALT 22   ANIONGAP 9   EGFRNONAA 10*       Significant Imaging: I have reviewed and interpreted all pertinent imaging results/findings within the past 24 hours.

## 2019-11-24 NOTE — CONSULTS
Consult Note  Nephrology    Consult Requested By: Roro Joaquin MD    Reason for Consult:  ESRD dependent on dialysis      SUBJECTIVE:     History of Present Illness: 80 y/o female patient, known to us, has out patient HD 3x week, now in [place], on MWF schedule.  She presented to ER w/hypoglycemia/mental status change.  Renal is consulted for dialysis orders.      Pt seen and examined.  Has LE edema.  HD orders placed for MWF w/UF 3-4L as tolerated.    Assessment/plan:  1.  ESRD--Continue with established dialysis schedule MWF  2.  Anemia of chronic dz--epo w/HD as indicated  3.  SHPT--continue sevelamer w/meals  4.  DM2--blood glucose control per hospital medicine  5.  HTN--VSS w/current meds    Past Medical History:   Diagnosis Date    Anemia     sees Dr. Loyola and Dr. Turner    Anticoagulant long-term use     aspirin    Arthritis     Cancer     left breast ca    Diabetes mellitus     Encounter for blood transfusion     GERD (gastroesophageal reflux disease)     Hypertension     sees Dr. Farmer    Renal disorder     CRF on hemodialysis MWF    Urinary tract infection      Past Surgical History:   Procedure Laterality Date    AV FISTULA PLACEMENT Right     BREAST SURGERY  2011    left mastectomy [facility] dr begum     SECTION      x2    [facility]ancerous adenomatous      [facility], ampulla of vater    HYSTERECTOMY      EDI    left knee patella FRACTURE      MODIFIED RADICAL MASTECTOMY W/ AXILLARY LYMPH NODE DISSECTION      right knee orthoscope       Family History   Problem Relation Age of Onset    Cancer Sister     Cancer Mother         pancreatic    Diabetes Mother     Hearing loss Mother     Cancer Father         bladder    Diabetes Father     Cancer Sister         half sister, breast     Social History     Tobacco Use    Smoking status: Never Smoker    Smokeless tobacco: Never Used   Substance Use Topics    Alcohol use: No    Drug use: No        Review of patient's allergies indicates:   Allergen Reactions    Metformin Rash and Nausea And Vomiting     Other reaction(s): Unknown    Sulfamethoxazole-trimethoprim Rash    Sulfamethoxazole Rash     Other reaction(s): Rash  Other reaction(s): Rash    Trimethoprim Rash     Other reaction(s): Rash  Other reaction(s): Rash        Review of Systems:  General ROS: negative for - fever or night sweats  Psychological ROS: negative for - behavioral disorder or depression  ENT ROS: negative for - headaches or visual changes  Hematological and Lymphatic ROS: negative for - bleeding problems or bruising  Endocrine ROS: negative for - temperature intolerance or unexpected weight changes  Respiratory ROS: no cough, shortness of breath, or wheezing  Cardiovascular ROS: no chest pain, SOB  Gastrointestinal ROS: no abdominal pain, no n/v/c/d  Genito-Urinary ROS: no dysuria or trouble voiding  Musculoskeletal ROS: negative for - joint pain or joint swelling  Neurological ROS: no TIA or stroke symptoms  Dermatological ROS: negative for rash and skin lesion changes    OBJECTIVE:     Vital Signs Range (Last 24H):  Temp:  [96.3 °F (35.7 °C)-98.6 °F (37 °C)]   Pulse:  [62-93]   Resp:  [16-20]   BP: (114-192)/()   SpO2:  [91 %-100 %]     Physical Exam:  General- NAD noted  HEENT- WNL  Neck- supple  CV- Regular rate and rhythm  Resp- Lungs CTA Bilaterally, No increased WOB  GI- Non tender/non-distended, BS normoactive x4 quads  Extrem- + edema.  Derm- skin w/d  Neuro-  No flap.     Body mass index is 32.72 kg/m².    Laboratory:  CBC:   Recent Labs   Lab 11/23/19  1643   WBC 5.12   RBC 2.98*   HGB 9.9*   HCT 31.3*   *   *   MCH 33.2*   MCHC 31.6*     CMP:   Recent Labs   Lab 11/23/19  1643 11/24/19  0553   GLU 77 168*   CALCIUM 9.3 8.6*   ALBUMIN 2.9*  --    PROT 6.8  --     136   K 4.3 4.7   CO2 26 24    102   BUN 24* 44*   CREATININE 4.1* 4.6*   ALKPHOS 171*  --    ALT 22  --    AST 32  --     BILITOT 0.6  --        Diagnostic Results:  Labs: Reviewed      ASSESSMENT/PLAN:     Active Hospital Problems    Diagnosis  POA    *Hypoglycemia associated with type 2 diabetes mellitus [E11.649]  Yes    Hypoglycemia [E16.2]  Yes    Anemia of chronic disease [D63.8]  Yes    Functional quadriplegia [R53.2]  Yes    Physical debility [R53.81]  Yes     Pelvic fracture 10/19      ESRD (end stage renal disease) [N18.6]  Yes     Chronic    Type 2 diabetes mellitus with kidney complication, with long-term current use of insulin [E11.29, Z79.4]  Not Applicable     Chronic    Hypertension [I10]  Yes      Resolved Hospital Problems   No resolved problems to display.         Thank you for allowing us to participate in the care of your patient. We will follow the patient and provide recommendations as needed.      Time spent seeing patient( greater than 1/2 spent in direct contact) :

## 2019-11-24 NOTE — ASSESSMENT & PLAN NOTE
Acute controlled --due to end-stage renal disease.  Use of Epogen as per Nephrology  Hemoglobin   Date Value Ref Range Status   11/23/2019 9.9 (L) 12.0 - 16.0 g/dL Final      Hematocrit   Date Value Ref Range Status   11/23/2019 31.3 (L) 37.0 - 48.5 % Final      Transfuse if patient becomes hemodynamically unstable and symptomatic or H/H  7/21   Monitor hgb status

## 2019-11-24 NOTE — ASSESSMENT & PLAN NOTE
Acute and uncontrolled   likely due to insulin usage without oral intake and possibly recent dose increase.  Check Point of care glucose every 2 hr.  And continue D5 at 100 cc an hour started in the ED and adjust rate to maintain point of care glucose less than 150.  Patient also tolerating oral intake at this time.  Monitor overnight.  Diabetic diet ordered

## 2019-11-24 NOTE — HPI
"Brandi Camacho is a 79 y.o. female with PMHx of CHF, DM, cancer, A-fib, GERD, Anemia, HTN, and renal disorder who presents to the ED via EMS to be checked after "unresponsive behavior" beginning approximately x2 hours PTA secondary to low blood sugar. EMS states upon arrival the patient was "unresponsive" and had trouble breathing. The blood sugar device outputted an error message because it was so low, and the patient was given an IO for sugar. The EMS adds that the patient has had a recent right hip fracture x1 month ago, and has been wheelchair or bed bound. The EMS concludes that her GCS was at 15 during the transport to the ED and at the ED. The patient had her normal insulin medicine this morning, but did not eat as reported by the family. The family explains to EMS personel that she hasn't eaten with her insulin medicine before. The patient denies fever chills, cough, chest pain, abdominal pain,  but she did have vomiting per her daughter.  Patient does not recall vomiting.  She is not aware of any exposures to other illnesses.  She did not have dysuria or diarrhea.  Pertinent PSHx includes , endoscopic precancerous adenomatous, left mastectomy, and AV fistula placement.     Blood sugar in ED was 95 and decreased to 75 in 1 hour so patient admitted for hypoglycemia.  She was awake and alert and able to tolerate p.o. in the emergency room.  Daughter present in the ED.  Patient is discharged from rehab after pelvic fracture 4 days prior.  Appears that there was an increase in her insulin dosage possibly at the rehab.  "

## 2019-11-25 ENCOUNTER — TELEPHONE (OUTPATIENT)
Dept: HOME HEALTH SERVICES | Facility: HOSPITAL | Age: 79
End: 2019-11-25

## 2019-11-25 ENCOUNTER — EXTERNAL HOME HEALTH (OUTPATIENT)
Dept: HOME HEALTH SERVICES | Facility: HOSPITAL | Age: 79
End: 2019-11-25

## 2019-11-25 VITALS
SYSTOLIC BLOOD PRESSURE: 162 MMHG | WEIGHT: 201.5 LBS | OXYGEN SATURATION: 98 % | HEIGHT: 65 IN | DIASTOLIC BLOOD PRESSURE: 62 MMHG | TEMPERATURE: 97 F | RESPIRATION RATE: 18 BRPM | HEART RATE: 71 BPM | BODY MASS INDEX: 33.57 KG/M2

## 2019-11-25 LAB
ANION GAP SERPL CALC-SCNC: 7 MMOL/L (ref 8–16)
BUN SERPL-MCNC: 42 MG/DL (ref 8–23)
CALCIUM SERPL-MCNC: 8.3 MG/DL (ref 8.7–10.5)
CHLORIDE SERPL-SCNC: 99 MMOL/L (ref 95–110)
CO2 SERPL-SCNC: 25 MMOL/L (ref 23–29)
CREAT SERPL-MCNC: 5.9 MG/DL (ref 0.5–1.4)
EST. GFR  (AFRICAN AMERICAN): 7 ML/MIN/1.73 M^2
EST. GFR  (NON AFRICAN AMERICAN): 6 ML/MIN/1.73 M^2
GLUCOSE SERPL-MCNC: 216 MG/DL (ref 70–110)
MAGNESIUM SERPL-MCNC: 2.2 MG/DL (ref 1.6–2.6)
PHOSPHATE SERPL-MCNC: 4.2 MG/DL (ref 2.7–4.5)
POCT GLUCOSE: 196 MG/DL (ref 70–110)
POTASSIUM SERPL-SCNC: 5.3 MMOL/L (ref 3.5–5.1)
SODIUM SERPL-SCNC: 131 MMOL/L (ref 136–145)

## 2019-11-25 PROCEDURE — 97161 PT EVAL LOW COMPLEX 20 MIN: CPT

## 2019-11-25 PROCEDURE — 25000003 PHARM REV CODE 250: Performed by: HOSPITALIST

## 2019-11-25 PROCEDURE — 36415 COLL VENOUS BLD VENIPUNCTURE: CPT

## 2019-11-25 PROCEDURE — 83735 ASSAY OF MAGNESIUM: CPT

## 2019-11-25 PROCEDURE — 84100 ASSAY OF PHOSPHORUS: CPT

## 2019-11-25 PROCEDURE — G0257 UNSCHED DIALYSIS ESRD PT HOS: HCPCS

## 2019-11-25 PROCEDURE — G0378 HOSPITAL OBSERVATION PER HR: HCPCS

## 2019-11-25 PROCEDURE — 80048 BASIC METABOLIC PNL TOTAL CA: CPT

## 2019-11-25 PROCEDURE — 80100016 HC MAINTENANCE HEMODIALYSIS

## 2019-11-25 PROCEDURE — 27000221 HC OXYGEN, UP TO 24 HOURS

## 2019-11-25 PROCEDURE — 97530 THERAPEUTIC ACTIVITIES: CPT

## 2019-11-25 PROCEDURE — 97802 MEDICAL NUTRITION INDIV IN: CPT

## 2019-11-25 PROCEDURE — 94761 N-INVAS EAR/PLS OXIMETRY MLT: CPT

## 2019-11-25 RX ORDER — ACETAMINOPHEN 325 MG/1
650 TABLET ORAL EVERY 4 HOURS PRN
Qty: 30 TABLET | Refills: 0 | Status: SHIPPED | OUTPATIENT
Start: 2019-11-25

## 2019-11-25 RX ADMIN — CETIRIZINE HYDROCHLORIDE 10 MG: 10 TABLET, FILM COATED ORAL at 08:11

## 2019-11-25 RX ADMIN — PANTOPRAZOLE SODIUM 40 MG: 40 TABLET, DELAYED RELEASE ORAL at 08:11

## 2019-11-25 RX ADMIN — GABAPENTIN 100 MG: 100 CAPSULE ORAL at 08:11

## 2019-11-25 RX ADMIN — SEVELAMER CARBONATE 800 MG: 800 TABLET, FILM COATED ORAL at 08:11

## 2019-11-25 NOTE — PLAN OF CARE
11/25/19 1552   Final Note   Assessment Type Final Discharge Note   Anticipated Discharge Disposition Home-Health   Hospital Follow Up  Appt(s) scheduled? Yes

## 2019-11-25 NOTE — PT/OT/SLP EVAL
"Physical Therapy Evaluation    Patient Name:  Brandi Camacho   MRN:  [id number]    Recommendations:     Discharge Recommendations:  home health PT   Discharge Equipment Recommendations: none   Barriers to discharge: Decreased caregiver support from , but has family living nearby to assist    Assessment:     Brandi Camacho is a 79 y.o. female admitted with a medical diagnosis of Hypoglycemia associated with type 2 diabetes mellitus.  She presents with the following impairments/functional limitations:  weakness, gait instability, impaired balance, impaired endurance, impaired self care skills, impaired functional mobilty, decreased lower extremity function, other (comment)(R hip/pelvic fx 1 month ago). Pt tolerated sitting EOB with min assist. With encouragement, pt stood at EOB to take 2 side steps with mod assist for safety. Pt tolerated bed<>chair transfer via RW, min assist. Pt  states good support from family at home to assist as needed. Would benefit from HHPT.    Rehab Prognosis: Fair; patient would benefit from acute skilled PT services to address these deficits and reach maximum level of function.    Recent Surgery: * No surgery found *      Plan:     During this hospitalization, patient to be seen 6 x/week to address the identified rehab impairments via therapeutic activities, therapeutic exercises, gait training and progress toward the following goals:    · Plan of Care Expires:  12/27/19    Subjective   Pt states she does not complete much walking, if at all, at home since pelvic fracture about a month ago. Pt is receiving HHPT and  states with help, she has been able to walk a few feet with RW.  also states son and grand kids live close by to assist him and the pt.  Chief Complaint: "Ouch, my left leg hurts." "Can you help me get into my wheelchair to take me to my car."  Patient/Family Comments/goals: get better and return home  Pain/Comfort:  · Pain Rating 1: (not " "rated)  · Location - Side 1: Left  · Location - Orientation 1: anterior  · Location 1: leg  · Pain Addressed 1: Distraction      Living Environment:  Lives in [facility], 0 steps to enter, with . Son lives "down the street".  Prior to admission, patients level of function was limited ambulation due to pelvic fracture about a month ago. Needs assistance from family for ADLs/iADLs.  Equipment used at home: walker, rolling, wheelchair.  DME owned (not currently used): none.  Upon discharge, patient will have assistance from family.    Objective:     Communicated with LEATHA Martin prior to session.  Patient found HOB elevated with telemetry, peripheral IV(bloody bandage on L anterior tibia)  upon PT entry to room.    General Precautions: Standard, fall   Orthopedic Precautions:N/A   Braces: N/A     Exams:  · Postural Exam:  Patient presented with the following abnormalities:    · -       Rounded shoulders  · -       Forward head  · RLE ROM: WFL  · RLE Strength: limited due to previous hx of pelvic fracture (~1 month ago)  · LLE ROM: WFL  · LLE Strength: WFL    Functional Mobility:  · Bed Mobility:     · Rolling Left:  minimum assistance  · Rolling Right: minimum assistance  · Scooting: minimum assistance  · Supine to Sit: minimum assistance  · Sit to Supine: moderate assistance  · Transfers:     · Sit to Stand:  minimum assistance with rolling walker  · Bed to Chair: minimum assistance with  rolling walker  using  Stand Pivot  · Gait: ~4 feet total. Pt took 2 side steps to HOB. When transferring from bec<>chair, pt took about 2 more steps to complete. Required min A and RW for safety      Therapeutic Activities and Exercises:   EOB to standing with min A, RW. Steps to HOB. Sit<>supine completed to exit other side of bed due to inability to ambulate around bed (lack of space and pt fatigue/debility). Pt completed sit<>stand with RW and min A to take steps to recliner. All needs met prior to leaving room. Posey alarm " set.    AM-PAC 6 CLICK MOBILITY  Total Score:13     Patient left up in chair with all lines intact, call button in reach, chair alarm on, RN Veronica notified and  present.    GOALS:   Multidisciplinary Problems     Physical Therapy Goals        Problem: Physical Therapy Goal    Goal Priority Disciplines Outcome Goal Variances Interventions   Physical Therapy Goal     PT, PT/OT Ongoing, Progressing     Description:  Goals to be met by: 2019     Patient will increase functional independence with mobility by performin. Sit to supine with Contact Guard Assistance  2. Sit to stand transfer with Contact Guard Assistance with RW  3. Bed to chair transfer with Minimal Assistance using Rolling Walker  4. Lower extremity exercise program x20 reps            Problem: Physical Therapy Goal    Goal Priority Disciplines Outcome Goal Variances Interventions   Physical Therapy Goal     PT, PT/OT                      History:     Past Medical History:   Diagnosis Date    Anemia     sees Dr. Loyola and Dr. Turner    Anticoagulant long-term use     aspirin    Arthritis     Cancer     left breast ca    Diabetes mellitus     Encounter for blood transfusion     GERD (gastroesophageal reflux disease)     Hypertension     sees Dr. Farmer    Renal disorder     CRF on hemodialysis MWF    Urinary tract infection        Past Surgical History:   Procedure Laterality Date    AV FISTULA PLACEMENT Right     BREAST SURGERY  2011    left mastectomy [facility] dr begum     SECTION      x2    endoscopic precancerous adenomatous      [facility] [place] dr colvin, ampulla of vater    HYSTERECTOMY      EDI    left knee patella FRACTURE      MODIFIED RADICAL MASTECTOMY W/ AXILLARY LYMPH NODE DISSECTION      right knee orthoscope         Time Tracking:     PT Received On: 19  PT Start Time: 1414     PT Stop Time: 1447  PT Total Time (min): 33 min     Billable Minutes: Evaluation 11 and Therapeutic Activity  22      Isabell Salamanca, PT  11/25/2019

## 2019-11-25 NOTE — PLAN OF CARE
11/24/19 1939   Patient Assessment/Suction   Level of Consciousness (AVPU) alert   PRE-TX-O2   O2 Device (Oxygen Therapy) room air   SpO2 97 %   Pulse Oximetry Type Intermittent

## 2019-11-25 NOTE — PROGRESS NOTES
11/25/19 1245   Post-Hemodialysis Assessment   Rinseback Volume (mL) 250 mL   Blood Volume Processed (Liters) 51 L   Dialyzer Clearance Lightly streaked   Duration of Treatment (minutes) 180 minutes   Hemodialysis Intake (mL) 500 mL   Total UF (mL) 3500 mL   Net Fluid Removal 3000   Patient Response to Treatment tolerated well   Post-Treatment Weight 88.4 kg (194 lb 14.2 oz)   Treatment Weight Change -3   Arterial bleeding stop time (min) 5 min   Venous bleeding stop time (min) 5 min   Post-Hemodialysis Comments tx complete, pt stable. lines re infused, needles removed. hemostasis achieved. thrill and bruit present post treatment.

## 2019-11-25 NOTE — ASSESSMENT & PLAN NOTE
Chronic and unstable due to hypoglycemia.  Hold any oral medications and adjust insulin as noted above.    Likely 2/2 decreased activity from recent pelvic fx  -adjust insulin doseage

## 2019-11-25 NOTE — SUBJECTIVE & OBJECTIVE
Interval History:   Seen/-improved. maame diet/ d10 tapered     Review of Systems   Respiratory: Positive for cough and shortness of breath.    Cardiovascular: Negative for chest pain and leg swelling.   Gastrointestinal: Negative for abdominal distention and abdominal pain.   Endocrine: Negative for polydipsia and polyuria.   Neurological: Positive for dizziness and weakness.     Objective:     Vital Signs (Most Recent):  Temp: 97.2 °F (36.2 °C) (11/24/19 1644)  Pulse: 64 (11/24/19 1644)  Resp: 20 (11/24/19 0855)  BP: (!) 115/57 (11/24/19 1644)  SpO2: 99 % (11/24/19 1644) Vital Signs (24h Range):  Temp:  [96.3 °F (35.7 °C)-98.6 °F (37 °C)] 97.2 °F (36.2 °C)  Pulse:  [64-93] 64  Resp:  [16-20] 20  SpO2:  [91 %-99 %] 99 %  BP: (110-192)/() 115/57     Weight: 89.2 kg (196 lb 10.4 oz)  Body mass index is 32.72 kg/m².    Intake/Output Summary (Last 24 hours) at 11/24/2019 1837  Last data filed at 11/24/2019 1600  Gross per 24 hour   Intake 2090 ml   Output --   Net 2090 ml      Physical Exam   Constitutional: She is oriented to person, place, and time. She appears well-developed and well-nourished. No distress.   HENT:   Head: Normocephalic and atraumatic.   Right Ear: External ear normal.   Left Ear: External ear normal.   Nose: Nose normal.   Mouth/Throat: Oropharynx is clear and moist. No oropharyngeal exudate.   Eyes: Conjunctivae and EOM are normal. Right eye exhibits no discharge. Left eye exhibits no discharge. No scleral icterus.   Neck: Normal range of motion. Neck supple. No thyromegaly present.   Cardiovascular: Normal rate, regular rhythm, normal heart sounds and intact distal pulses. Exam reveals no gallop and no friction rub.   No murmur heard.  Pulmonary/Chest: Effort normal and breath sounds normal. No respiratory distress. She has no wheezes.   Abdominal: Soft. Bowel sounds are normal. She exhibits no distension and no mass. There is no tenderness. There is no rebound and no guarding.    Musculoskeletal: Normal range of motion. She exhibits no edema or tenderness.   Fistula with bruit and thrill right upper extremity upper arm.   Lymphadenopathy:     She has no cervical adenopathy.   Neurological: She is alert and oriented to person, place, and time. No cranial nerve deficit. Coordination normal.   Skin: Skin is warm and dry. Capillary refill takes less than 2 seconds. No rash noted. No erythema. There is pallor.   Psychiatric: She has a normal mood and affect. Her behavior is normal. Judgment and thought content normal.   Nursing note and vitals reviewed.      Significant Labs:   BMP:   Recent Labs   Lab 11/24/19  0553   *      K 4.7      CO2 24   BUN 44*   CREATININE 4.6*   CALCIUM 8.6*   MG 2.0     CBC:   Recent Labs   Lab 11/23/19  1643   WBC 5.12   HGB 9.9*   HCT 31.3*   *       Significant Imaging: I have reviewed and interpreted all pertinent imaging results/findings within the past 24 hours.

## 2019-11-25 NOTE — PLAN OF CARE
Pt accepted by [facility]. Discharge destination booked and AVS updated. Vandana Guerra, ANNA     11/25/19 1531   Post-Acute Status   Post-Acute Authorization Home Health/Hospice   Home Health/Hospice Status Set-up Complete

## 2019-11-25 NOTE — PROGRESS NOTES
"[facility] Hospital Medicine  Progress Note    Patient Name: Brandi Camacho  MRN: [id number]  Patient Class: OP- Observation   Admission Date: 2019  Length of Stay: 0 days  Attending Physician: Roro Joaquin MD  Primary Care Provider: Greg Skinner Iv, MD        Subjective:     Principal Problem:Hypoglycemia associated with type 2 diabetes mellitus        HPI:  Brandi Camacho is a 79 y.o. female with PMHx of CHF, DM, cancer, A-fib, GERD, Anemia, HTN, and renal disorder who presents to the ED via EMS to be checked after "unresponsive behavior" beginning approximately x2 hours PTA secondary to low blood sugar. EMS states upon arrival the patient was "unresponsive" and had trouble breathing. The blood sugar device outputted an error message because it was so low, and the patient was given an IO for sugar. The EMS adds that the patient has had a recent right hip fracture x1 month ago, and has been wheelchair or bed bound. The EMS concludes that her GCS was at 15 during the transport to the ED and at the ED. The patient had her normal insulin medicine this morning, but did not eat as reported by the family. The family explains to EMS personel that she hasn't eaten with her insulin medicine before. The patient denies fever chills, cough, chest pain, abdominal pain,  but she did have vomiting per her daughter.  Patient does not recall vomiting.  She is not aware of any exposures to other illnesses.  She did not have dysuria or diarrhea.  Pertinent PSHx includes , endoscopic precancerous adenomatous, left mastectomy, and AV fistula placement.     Blood sugar in ED was 95 and decreased to 75 in 1 hour so patient admitted for hypoglycemia.  She was awake and alert and able to tolerate p.o. in the emergency room.  Daughter present in the ED.  Patient is discharged from rehab after pelvic fracture 4 days prior.  Appears that there was an increase in her insulin dosage possibly at the " rehab.    Overview/Hospital Course:  No notes on file    Interval History:   Seen/-improved. maame diet/ d10 tapered     Review of Systems   Respiratory: Positive for cough and shortness of breath.    Cardiovascular: Negative for chest pain and leg swelling.   Gastrointestinal: Negative for abdominal distention and abdominal pain.   Endocrine: Negative for polydipsia and polyuria.   Neurological: Positive for dizziness and weakness.     Objective:     Vital Signs (Most Recent):  Temp: 97.2 °F (36.2 °C) (11/24/19 1644)  Pulse: 64 (11/24/19 1644)  Resp: 20 (11/24/19 0855)  BP: (!) 115/57 (11/24/19 1644)  SpO2: 99 % (11/24/19 1644) Vital Signs (24h Range):  Temp:  [96.3 °F (35.7 °C)-98.6 °F (37 °C)] 97.2 °F (36.2 °C)  Pulse:  [64-93] 64  Resp:  [16-20] 20  SpO2:  [91 %-99 %] 99 %  BP: (110-192)/() 115/57     Weight: 89.2 kg (196 lb 10.4 oz)  Body mass index is 32.72 kg/m².    Intake/Output Summary (Last 24 hours) at 11/24/2019 1837  Last data filed at 11/24/2019 1600  Gross per 24 hour   Intake 2090 ml   Output --   Net 2090 ml      Physical Exam   Constitutional: She is oriented to person, place, and time. She appears well-developed and well-nourished. No distress.   HENT:   Head: Normocephalic and atraumatic.   Right Ear: External ear normal.   Left Ear: External ear normal.   Nose: Nose normal.   Mouth/Throat: Oropharynx is clear and moist. No oropharyngeal exudate.   Eyes: Conjunctivae and EOM are normal. Right eye exhibits no discharge. Left eye exhibits no discharge. No scleral icterus.   Neck: Normal range of motion. Neck supple. No thyromegaly present.   Cardiovascular: Normal rate, regular rhythm, normal heart sounds and intact distal pulses. Exam reveals no gallop and no friction rub.   No murmur heard.  Pulmonary/Chest: Effort normal and breath sounds normal. No respiratory distress. She has no wheezes.   Abdominal: Soft. Bowel sounds are normal. She exhibits no distension and no mass. There is no  tenderness. There is no rebound and no guarding.   Musculoskeletal: Normal range of motion. She exhibits no edema or tenderness.   Fistula with bruit and thrill right upper extremity upper arm.   Lymphadenopathy:     She has no cervical adenopathy.   Neurological: She is alert and oriented to person, place, and time. No cranial nerve deficit. Coordination normal.   Skin: Skin is warm and dry. Capillary refill takes less than 2 seconds. No rash noted. No erythema. There is pallor.   Psychiatric: She has a normal mood and affect. Her behavior is normal. Judgment and thought content normal.   Nursing note and vitals reviewed.      Significant Labs:   BMP:   Recent Labs   Lab 11/24/19  0553   *      K 4.7      CO2 24   BUN 44*   CREATININE 4.6*   CALCIUM 8.6*   MG 2.0     CBC:   Recent Labs   Lab 11/23/19  1643   WBC 5.12   HGB 9.9*   HCT 31.3*   *       Significant Imaging: I have reviewed and interpreted all pertinent imaging results/findings within the past 24 hours.      Assessment/Plan:      * Hypoglycemia associated with type 2 diabetes mellitus  CC hypoglycemia      Anemia of chronic disease  Acute controlled --due to end-stage renal disease.  Use of Epogen as per Nephrology  Hemoglobin   Date Value Ref Range Status   11/23/2019 9.9 (L) 12.0 - 16.0 g/dL Final      Hematocrit   Date Value Ref Range Status   11/23/2019 31.3 (L) 37.0 - 48.5 % Final      Transfuse if patient becomes hemodynamically unstable and symptomatic or H/H  7/21   Monitor hgb status       Hypoglycemia  Acute and uncontrolled-see DM    likely due to insulin usage without oral intake and possibly recent dose increase./decreased activity   Check Point of care glucose every 2 hr.  Monitor overnight.  Diabetic diet ordered      Physical debility  consutl pt/ot       ESRD (end stage renal disease)  Chronic and stable.  Consult Nephrology for hemodialysis orders.  Renal dose her medications  HD today -dc home     Type 2  diabetes mellitus with kidney complication, with long-term current use of insulin    Chronic and unstable due to hypoglycemia.  Hold any oral medications and adjust insulin as noted above.    Likely 2/2 decreased activity from recent pelvic fx  -adjust insulin doseage     Hypertension  Chronic and uncontrolled  .  Will continue BP medications as needed for sustained BP control.  Will utilize p.r.n. blood pressure medication only if patient's blood pressure greater than  180/110 and  develops symptoms such as worsening chest pain or shortness of breath.  Hypertension Medications             furosemide (LASIX) 20 MG tablet Take 20 mg by mouth 2 (two) times daily.     labetalol (NORMODYNE) 200 MG tablet Take 200 mg by mouth 2 (two) times daily.                  VTE Risk Mitigation (From admission, onward)         Ordered     IP VTE HIGH RISK PATIENT  Once      11/23/19 2030     Place sequential compression device  Until discontinued      11/23/19 2030     Place PAM hose  Until discontinued      11/23/19 2030                      Roro Joaquin MD  Department of Hospital Medicine   [facility]

## 2019-11-25 NOTE — ASSESSMENT & PLAN NOTE
Chronic and stable.  Consult Nephrology for hemodialysis orders.  Renal dose her medications  HD today -dc home

## 2019-11-25 NOTE — NURSING
Discharge instructions reviewed with pt and spouse, understanding verbalized. PIV dc, catheter intact. Tele box returned. All questions answered. Educated pt on new insulin doses and follow up appointment, verbalized understanding. Pt transferred off unit via wheelchair. NAD noted.  at side.

## 2019-11-25 NOTE — PT/OT/SLP PROGRESS
Occupational Therapy      Patient Name:  Brandi Camacho   MRN:  [id number]    Patient not seen today secondary to Unavailable (Comment), Other (Comment)(in dialysis). Will follow-up 11/26/19.    AYDEE Martin LOTR  11/25/2019

## 2019-11-25 NOTE — PROGRESS NOTES
Consult Note  Nephrology    Consult Requested By: Roro Joaquin MD    Reason for Consult:  ESRD dependent on dialysis      SUBJECTIVE:     History of Present Illness: 78 y/o female patient, known to us, has out patient HD 3x week, now in [place], on MWF schedule.  She presented to ER w/hypoglycemia/mental status change.  Renal is consulted for dialysis orders.      Pt seen and examined.  Has LE edema.  HD orders placed for MWF w/UF 3-4L as tolerated.   No nausea, chest pain, sob, fever, urinary or bowel complaint, new neurologic symptoms, new joint pain,        Assessment/plan:  1.  ESRD--Continue with established dialysis schedule MWF  2.  Anemia of chronic dz--epo w/HD as indicated  3.  SHPT--continue sevelamer w/meals  4.  DM2--blood glucose control per hospital medicine  5.  HTN--VSS w/current meds    Past Medical History:   Diagnosis Date    Anemia     sees Dr. Loyola and Dr. Turner    Anticoagulant long-term use     aspirin    Arthritis     Cancer     left breast ca    Diabetes mellitus     Encounter for blood transfusion     GERD (gastroesophageal reflux disease)     Hypertension     sees Dr. Farmer    Renal disorder     CR[facility] on hemodialysis MWF    Urinary tract infection      Past Surgical History:   Procedure Laterality Date    AV FISTULA PLACEMENT Right     BREAST SURGERY  2011    left mastectomy [facility] dr begum     SECTION      x2    [facility] precancerous adenomatous      [facility], ampulla of vater    HYSTERECTOMY      EDI    left knee patella FRACTURE      MODIFIED RADICAL MASTECTOMY W/ AXILLARY LYMPH NODE DISSECTION      right knee orthoscope       Family History   Problem Relation Age of Onset    Cancer Sister     Cancer Mother         pancreatic    Diabetes Mother     Hearing loss Mother     Cancer Father         bladder    Diabetes Father     Cancer Sister         half sister, breast     Social History     Tobacco Use    Smoking  status: Never Smoker    Smokeless tobacco: Never Used   Substance Use Topics    Alcohol use: No    Drug use: No       Review of patient's allergies indicates:   Allergen Reactions    Metformin Rash and Nausea And Vomiting     Other reaction(s): Unknown    Sulfamethoxazole-trimethoprim Rash    Sulfamethoxazole Rash     Other reaction(s): Rash  Other reaction(s): Rash    Trimethoprim Rash     Other reaction(s): Rash  Other reaction(s): Rash        Review of Systems:  General ROS: negative for - fever or night sweats  Psychological ROS: negative for - behavioral disorder or depression  ENT ROS: negative for - headaches or visual changes  Hematological and Lymphatic ROS: negative for - bleeding problems or bruising  Endocrine ROS: negative for - temperature intolerance or unexpected weight changes  Respiratory ROS: no cough, shortness of breath, or wheezing  Cardiovascular ROS: no chest pain, SOB  Gastrointestinal ROS: no abdominal pain, no n/v/c/d  Genito-Urinary ROS: no dysuria or trouble voiding  Musculoskeletal ROS: negative for - joint pain or joint swelling  Neurological ROS: no TIA or stroke symptoms  Dermatological ROS: negative for rash and skin lesion changes    OBJECTIVE:     Vital Signs Range (Last 24H):  Temp:  [97.1 °F (36.2 °C)-97.8 °F (36.6 °C)]   Pulse:  [64-72]   Resp:  [16-18]   BP: ()/(48-58)   SpO2:  [91 %-99 %]     Physical Exam:  General- NAD noted  HEENT- WNL  Neck- supple  CV- Regular rate and rhythm  Resp- Lungs CTA Bilaterally, No increased WOB  GI- Non tender/non-distended, BS normoactive x4 quads  Extrem- + edema.  Derm- skin w/d  Neuro-  No flap.     Body mass index is 33.53 kg/m².    Laboratory:  CBC:   Recent Labs   Lab 11/23/19  1643   WBC 5.12   RBC 2.98*   HGB 9.9*   HCT 31.3*   *   *   MCH 33.2*   MCHC 31.6*     CMP:   Recent Labs   Lab 11/23/19  1643  11/25/19  0526   GLU 77   < > 216*   CALCIUM 9.3   < > 8.3*   ALBUMIN 2.9*  --   --    PROT 6.8  --   --        < > 131*   K 4.3   < > 5.3*   CO2 26   < > 25      < > 99   BUN 24*   < > 42*   CREATININE 4.1*   < > 5.9*   ALKPHOS 171*  --   --    ALT 22  --   --    AST 32  --   --    BILITOT 0.6  --   --     < > = values in this interval not displayed.       Diagnostic Results:  Labs: Reviewed      ASSESSMENT/PLAN:     Active Hospital Problems    Diagnosis  POA    *Hypoglycemia associated with type 2 diabetes mellitus [E11.649]  Yes    Hypoglycemia [E16.2]  Yes    Anemia of chronic disease [D63.8]  Yes    Functional quadriplegia [R53.2]  Yes    Physical debility [R53.81]  Yes     Pelvic fracture 10/19      ESRD (end stage renal disease) [N18.6]  Yes     Chronic    Type 2 diabetes mellitus with kidney complication, with long-term current use of insulin [E11.29, Z79.4]  Not Applicable     Chronic    Hypertension [I10]  Yes      Resolved Hospital Problems   No resolved problems to display.         Thank you for allowing us to participate in the care of your patient. We will follow the patient and provide recommendations as needed.      Time spent seeing patient( greater than 1/2 spent in direct contact) :

## 2019-11-25 NOTE — ASSESSMENT & PLAN NOTE
Acute and uncontrolled-see DM    likely due to insulin usage without oral intake and possibly recent dose increase./decreased activity   Check Point of care glucose every 2 hr.  Monitor overnight.  Diabetic diet ordered

## 2019-11-25 NOTE — PLAN OF CARE
Pt in bed resting. VSS. NAD. No SOB noted. O2 3LPM. Blood sugar monitored. PRN pain meds given. Dialysis in the AM.  POC reviewed.  at bedside. Safety maintained. Call light in reach. Bed low and locked. Alarm on. Will continue to monitor.

## 2019-11-25 NOTE — CONSULTS
Food & Nutrition  Education    Diet Education: Diabetic nutrition  Time Spent:15 min  Learners:patient      Nutrition Education provided with handouts: yes      Comments: Pt states she has 3 meals daily @ Breakfast 7-7:30, Lunch 12, Supper 4-5 pm and seldom has an evening snack.  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  They eat out 2-3 x wkly. Discussed carbohydrates in each meal and have an evening snack. Education re types of foods that are carb.      Left handouts.  Pt needs reinforcement.

## 2019-11-25 NOTE — PLAN OF CARE
Hospital follow up scheduled with PCP. AVS updated.        11/25/19 7855   Discharge Assessment   Assessment Type Discharge Planning Reassessment

## 2019-11-25 NOTE — PLAN OF CARE
Problem: Physical Therapy Goal  Goal: Physical Therapy Goal  Description  Goals to be met by: 2019     Patient will increase functional independence with mobility by performin. Sit to supine with Contact Guard Assistance  2. Sit to stand transfer with Contact Guard Assistance with RW  3. Bed to chair transfer with Minimal Assistance using Rolling Walker  4. Lower extremity exercise program x20 reps    Outcome: Ongoing, Progressing  PT eval and treat completed. Ambulated 2 side steps to HOB. Mod assist for bed mobility. Stand<>pivot transfer from bed<>chair with mod A for safety.

## 2019-11-27 NOTE — HOSPITAL COURSE
79 y.o. female  admitted for hypoglycemia.  Had recent hospitalization and skilled nursing course for pelvic fracture.  She is been mainly in the wheelchair and still taking same amount of insulin so possibly does not need as much and her dosage was decreased for discharge. She required D10 supplementation overnight on admission and tolerated p.o..  When her blood glucoses remained stable.  She was given insulin to scale and required minimal amount of insulin.  She has also end-stage renal disease on dialysis and was given dialysis treatments in the hospital remained stable and discharged home with home health.  She is alert no distress. Lungs are clear heart is regular without murmur.  Abdomen is soft.  Fistula right upper extremity intact.   present at time of discharge

## 2019-11-27 NOTE — DISCHARGE SUMMARY
"[facility] Medicine  Discharge Summary      Patient Name: Brandi Camacho  MRN: [id number]  Admission Date: 2019  Hospital Length of Stay: 0 days  Discharge Date and Time: 2019  4:10 PM  Attending Physician: No att. providers found   Discharging Provider: Roro Joaquin MD  Primary Care Provider: Greg Skinner Iv, MD      HPI:   Brandi Camacho is a 79 y.o. female with PMHx of CHF, DM, cancer, A-fib, GERD, Anemia, HTN, and renal disorder who presents to the ED via EMS to be checked after "unresponsive behavior" beginning approximately x2 hours PTA secondary to low blood sugar. EMS states upon arrival the patient was "unresponsive" and had trouble breathing. The blood sugar device outputted an error message because it was so low, and the patient was given an IO for sugar. The EMS adds that the patient has had a recent right hip fracture x1 month ago, and has been wheelchair or bed bound. The EMS concludes that her GCS was at 15 during the transport to the ED and at the ED. The patient had her normal insulin medicine this morning, but did not eat as reported by the family. The family explains to EMS personel that she hasn't eaten with her insulin medicine before. The patient denies fever chills, cough, chest pain, abdominal pain,  but she did have vomiting per her daughter.  Patient does not recall vomiting.  She is not aware of any exposures to other illnesses.  She did not have dysuria or diarrhea.  Pertinent PSHx includes , endoscopic precancerous adenomatous, left mastectomy, and AV fistula placement.     Blood sugar in ED was 95 and decreased to 75 in 1 hour so patient admitted for hypoglycemia.  She was awake and alert and able to tolerate p.o. in the emergency room.  Daughter present in the ED.  Patient is discharged from rehab after pelvic fracture 4 days prior.  Appears that there was an increase in her insulin dosage possibly at the rehab.    * No surgery found " *      Hospital Course:   79 y.o. female  admitted for hypoglycemia.  Had recent hospitalization and skilled nursing course for pelvic fracture.  She is been mainly in the wheelchair and still taking same amount of insulin so possibly does not need as much and her dosage was decreased for discharge. She required D10 supplementation overnight on admission and tolerated p.o..  When her blood glucoses remained stable.  She was given insulin to scale and required minimal amount of insulin.  She has also end-stage renal disease on dialysis and was given dialysis treatments in the hospital remained stable and discharged home with home health.  She is alert no distress. Lungs are clear heart is regular without murmur.  Abdomen is soft.  Fistula right upper extremity intact.   present at time of discharge     Consults:   Consults (From admission, onward)        Status Ordering Provider     Inpatient consult to Nephrology  Once     Provider:  Lavlel Wolf MD    Completed MALCOM VILLAFUERTE     Inpatient consult to Registered Dietitian/Nutritionist  Once     Provider:  (Not yet assigned)    Completed MALCOM VILLAFUERTE     Inpatient consult to Social Work/Case Management  Once     Provider:  (Not yet assigned)    Completed MALCOM VILLAFUERTE          No new Assessment & Plan notes have been filed under this hospital service since the last note was generated.  Service: Hospital Medicine    Final Active Diagnoses:    Diagnosis Date Noted POA    PRINCIPAL PROBLEM:  Hypoglycemia associated with type 2 diabetes mellitus [E11.649] 11/23/2019 Yes    Hypoglycemia [E16.2] 11/23/2019 Yes    Anemia of chronic disease [D63.8] 11/23/2019 Yes    Functional quadriplegia [R53.2] 11/23/2019 Yes    Physical debility [R53.81] 10/24/2019 Yes    ESRD (end stage renal disease) [N18.6] 10/23/2019 Yes     Chronic    Type 2 diabetes mellitus with kidney complication, with long-term current use of insulin [E11.29, Z79.4] 11/06/2015 Not  Applicable     Chronic    Hypertension [I10] 11/06/2015 Yes      Problems Resolved During this Admission:       Discharged Condition: good    [place] [facility]    [place] Up:  Follow-up Information     Greg Skinner Iv, MD On 12/10/2019.    Specialty:  Family Medicine  Why:  at 1:40 pm for hospital follow up  Contact information:  [address]   [place]  [place] [address]  [phone]             Ms [facility] [place].    Specialties:  Hospice Services, Home Health Services  Why:  Home Health  Contact information:  [address]  [place] [address]  [phone]                 Patient Instructions:      Referral to Home health   Referral Priority: Routine Referral Type: Home Health   Referral Reason: Specialty Services Required   Requested Specialty: Home Health Services   Number of Visits Requested: 1       Significant Diagnostic Studies:   Results for DOUGLAS VALENTIN (MRN [id number]) as of 11/27/2019 04:29   Ref. Range 11/23/2019 16:43   WBC Latest Ref Range: 3.90 - 12.70 K/uL 5.12   RBC Latest Ref Range: 4.00 - 5.40 M/uL 2.98 (L)   Hemoglobin Latest Ref Range: 12.0 - 16.0 g/dL 9.9 (L)   Hematocrit Latest Ref Range: 37.0 - 48.5 % 31.3 (L)   MCV Latest Ref Range: 82 - 98 fL 105 (H)   MCH Latest Ref Range: 27.0 - 31.0 pg 33.2 (H)   MCHC Latest Ref Range: 32.0 - 36.0 g/dL 31.6 (L)   RDW Latest Ref Range: 11.5 - 14.5 % 16.0 (H)   Platelets Latest Ref Range: 150 - 350 K/uL 118 (L)   Results for DOUGLAS VALENTIN (MRN [id number]) as of 11/27/2019 04:29   Ref. Range 11/24/2019 05:53 11/25/2019 05:26   Sodium Latest Ref Range: 136 - 145 mmol/L 136 131 (L)   Potassium Latest Ref Range: 3.5 - 5.1 mmol/L 4.7 5.3 (H)   Chloride Latest Ref Range: 95 - 110 mmol/L 102 99   CO2 Latest Ref Range: 23 - 29 mmol/L 24 25   Anion Gap Latest Ref Range: 8 - 16 mmol/L 10 7 (L)   BUN, Bld Latest Ref Range: 8 - 23 mg/dL 44 (H) 42 (H)   Creatinine Latest Ref Range: 0.5 - 1.4 mg/dL 4.6 (H) 5.9 (H)   eGFR if non African  American Latest Ref Range: >60 mL/min/1.73 m^2 8 (A) 6 (A)   eGFR if African American Latest Ref Range: >60 mL/min/1.73 m^2 10 (A) 7 (A)   Glucose Latest Ref Range: 70 - 110 mg/dL 168 (H) 216 (H)   Calcium Latest Ref Range: 8.7 - 10.5 mg/dL 8.6 (L) 8.3 (L)   Phosphorus Latest Ref Range: 2.7 - 4.5 mg/dL 4.2 4.2   Magnesium Latest Ref Range: 1.6 - 2.6 mg/dL 2.0 2.2   Results for DOUGLAS VALENTIN (MRN [id number]) as of 11/27/2019 04:29   Ref. Range 11/24/2019 05:53   Hemoglobin A1C External Latest Ref Range: 4.0 - 5.6 % 6.0 (H)   Estimated Avg Glucose Latest Ref Range: 68 - 131 mg/dL 126       Pending Diagnostic Studies:     None         MeResults for DOUGLAS VALENTIN (MRN [id number]) as of 11/27/2019 04:29   Ref. Range 11/24/2019 08:58 11/24/2019 11:10 11/24/2019 20:43   POCT Glucose Latest Ref Range: 70 - 110 mg/dL 214 (H) 267 (H) 231 (H)   dications:  Reconciled Home Medications:      Medication List      START taking these medications    acetaminophen 325 MG tablet  Commonly known as:  TYLENOL  Take 2 tablets (650 mg total) by mouth every 4 (four) hours as needed.        CHANGE how you take these medications    HYDROcodone-acetaminophen 5-325 mg per tablet  Commonly known as:  NORCO  Take 1 tablet by mouth every 8 (eight) hours as needed for Pain.  What changed:  additional instructions     insulin NPH-insulin regular (70/30) 100 unit/mL (70-30) injection  Commonly known as:  NovoLIN 70/30 U-100 Insulin  Inject 15 Units into the skin once daily. AND 10 UNITS QHS  What changed:    · how much to take  · Another medication with the same name was removed. Continue taking this medication, and follow the directions you see here.        CONTINUE taking these medications    calcitRIOL 0.5 MCG Cap  Commonly known as:  ROCALTROL  Take 0.5 mcg by mouth once daily.     cetirizine 10 MG tablet  Commonly known as:  ZYRTEC  Take 10 mg by mouth once daily.     cyclobenzaprine 10 MG tablet  Commonly known as:  FLEXERIL  Take 10 mg by mouth  3 (three) times daily as needed for Muscle spasms.     docusate sodium 100 MG capsule  Commonly known as:  COLACE  Take 100 mg by mouth 2 (two) times daily.     FOLBIC ORAL  Take by mouth every evening.     furosemide 20 MG tablet  Commonly known as:  LASIX  Take 20 mg by mouth 2 (two) times daily.     gabapentin 100 MG capsule  Commonly known as:  NEURONTIN  Take 100 mg by mouth 2 (two) times daily.     labetalol 200 MG tablet  Commonly known as:  NORMODYNE  Take 200 mg by mouth 2 (two) times daily.     omeprazole 40 MG capsule  Commonly known as:  PRILOSEC  Take 40 mg by mouth once daily.     ondansetron 4 MG Tbdl  Commonly known as:  ZOFRAN-ODT  Take 4 mg by mouth every 12 (twelve) hours as needed (nausea). Take for 15 days.     pravastatin 40 MG tablet  Commonly known as:  PRAVACHOL  Take 40 mg by mouth every evening.     promethazine 12.5 MG Tab  Commonly known as:  PHENERGAN  Take 12.5 mg by mouth every 8 (eight) hours as needed (nausea). Take for 10 days.     ranitidine 150 MG tablet  Commonly known as:  ZANTAC  Take 150 mg by mouth 2 (two) times daily.     TOM-ANA RX ORAL  Take 1 tablet by mouth once daily.     * sevelamer carbonate 800 mg Tab  Commonly known as:  RENVELA  Take 1,600 mg by mouth daily with dinner or evening meal.     * sevelamer carbonate 800 mg Tab  Commonly known as:  RENVELA  Take 800 mg by mouth 2 (two) times daily with meals. 800 mg bid with smaller meals and 1600 mg qd with largest meal         * This list has 2 medication(s) that are the same as other medications prescribed for you. Read the directions carefully, and ask your doctor or other care provider to review them with you.                Indwelling Lines/Drains at time of discharge:   Lines/Drains/Airways     Central Venous Catheter Line                 Hemodialysis Catheter 04/21/16 2020 1314 days          Drain                 Hemodialysis AV Fistula Right upper arm -- days         Hemodialysis AV Graft Right forearm --  days                Time spent on the discharge of patient: 38 minutes  Patient was seen and examined on the date of discharge and determined to be suitable for discharge.         Roro Joaquin MD  Department of Hospital Medicine  [facility]

## 2019-12-04 ENCOUNTER — TELEPHONE (OUTPATIENT)
Dept: HOME HEALTH SERVICES | Facility: HOSPITAL | Age: 79
End: 2019-12-04

## 2019-12-10 ENCOUNTER — TELEPHONE (OUTPATIENT)
Dept: HOME HEALTH SERVICES | Facility: HOSPITAL | Age: 79
End: 2019-12-10

## 2023-01-04 NOTE — UM SECONDARY REVIEW
Physician Advisor External    Level of Care Issue    1712  Sent to EHR for review of DOS 10/31/19   Number Of Stages: 2